# Patient Record
Sex: MALE | Race: WHITE | NOT HISPANIC OR LATINO | Employment: OTHER | URBAN - METROPOLITAN AREA
[De-identification: names, ages, dates, MRNs, and addresses within clinical notes are randomized per-mention and may not be internally consistent; named-entity substitution may affect disease eponyms.]

---

## 2017-01-17 ENCOUNTER — APPOINTMENT (OUTPATIENT)
Dept: PREADMISSION TESTING | Facility: HOSPITAL | Age: 77
End: 2017-01-17

## 2017-01-17 DIAGNOSIS — I47.1 SVT (SUPRAVENTRICULAR TACHYCARDIA) (HCC): ICD-10-CM

## 2017-01-17 LAB
ANION GAP SERPL CALCULATED.3IONS-SCNC: 10 MMOL/L (ref 3–11)
BASOPHILS # BLD AUTO: 0.02 10*3/MM3 (ref 0–0.2)
BASOPHILS NFR BLD AUTO: 0.2 % (ref 0–1)
BUN BLD-MCNC: 26 MG/DL (ref 9–23)
BUN/CREAT SERPL: 26 (ref 7–25)
CALCIUM SPEC-SCNC: 9.8 MG/DL (ref 8.7–10.4)
CHLORIDE SERPL-SCNC: 100 MMOL/L (ref 99–109)
CO2 SERPL-SCNC: 32 MMOL/L (ref 20–31)
CREAT BLD-MCNC: 1 MG/DL (ref 0.6–1.3)
DEPRECATED RDW RBC AUTO: 47.8 FL (ref 37–54)
EOSINOPHIL # BLD AUTO: 0.06 10*3/MM3 (ref 0.1–0.3)
EOSINOPHIL NFR BLD AUTO: 0.7 % (ref 0–3)
ERYTHROCYTE [DISTWIDTH] IN BLOOD BY AUTOMATED COUNT: 13.9 % (ref 11.3–14.5)
GFR SERPL CREATININE-BSD FRML MDRD: 72 ML/MIN/1.73
GLUCOSE BLD-MCNC: 103 MG/DL (ref 70–100)
HCT VFR BLD AUTO: 44.6 % (ref 38.9–50.9)
HGB BLD-MCNC: 14.9 G/DL (ref 13.1–17.5)
IMM GRANULOCYTES # BLD: 0.01 10*3/MM3 (ref 0–0.03)
IMM GRANULOCYTES NFR BLD: 0.1 % (ref 0–0.6)
INR PPP: 0.97
LYMPHOCYTES # BLD AUTO: 1.57 10*3/MM3 (ref 0.6–4.8)
LYMPHOCYTES NFR BLD AUTO: 18.9 % (ref 24–44)
MCH RBC QN AUTO: 30.8 PG (ref 27–31)
MCHC RBC AUTO-ENTMCNC: 33.4 G/DL (ref 32–36)
MCV RBC AUTO: 92.3 FL (ref 80–99)
MONOCYTES # BLD AUTO: 0.62 10*3/MM3 (ref 0–1)
MONOCYTES NFR BLD AUTO: 7.5 % (ref 0–12)
NEUTROPHILS # BLD AUTO: 6.02 10*3/MM3 (ref 1.5–8.3)
NEUTROPHILS NFR BLD AUTO: 72.6 % (ref 41–71)
PLATELET # BLD AUTO: 218 10*3/MM3 (ref 150–450)
PMV BLD AUTO: 9 FL (ref 6–12)
POTASSIUM BLD-SCNC: 4.3 MMOL/L (ref 3.5–5.5)
PROTHROMBIN TIME: 10.6 SECONDS (ref 9.6–11.5)
RBC # BLD AUTO: 4.83 10*6/MM3 (ref 4.2–5.76)
SODIUM BLD-SCNC: 142 MMOL/L (ref 132–146)
WBC NRBC COR # BLD: 8.3 10*3/MM3 (ref 3.5–10.8)

## 2017-01-17 PROCEDURE — 80048 BASIC METABOLIC PNL TOTAL CA: CPT | Performed by: PHYSICIAN ASSISTANT

## 2017-01-17 PROCEDURE — 85610 PROTHROMBIN TIME: CPT | Performed by: PHYSICIAN ASSISTANT

## 2017-01-17 PROCEDURE — 36415 COLL VENOUS BLD VENIPUNCTURE: CPT

## 2017-01-17 PROCEDURE — 85025 COMPLETE CBC W/AUTO DIFF WBC: CPT | Performed by: PHYSICIAN ASSISTANT

## 2017-01-17 NOTE — DISCHARGE INSTRUCTIONS
The following instructions given during Pre Admission Testing visit:    Do not eat or drink anything after MN except for sips of water with your a.m. Prescription meds unless otherwise instructed by your physician.    Glasses and jewelry may be worn, but dentures must be removed prior to cath/procedure.    Leave any items you consider valuable at home.    Family members may wait in [facility] waiting area during procedure.    Bring all medications in their original containers the day of procedure.    Bring photo ID and insurance cards on the day of procedure.    Need to make arrangements for transportation prior to discharge.    The following handouts were given:     Heart Cath pathway (if applicable)   Cardiac Cath booklet published by [facility]    OR appropriate [facility] procedure booklet    If applicable, pt instructed to bring CPAP mask and tubing the day of procedure.

## 2017-01-18 PROBLEM — I47.19 AVNRT (AV NODAL RE-ENTRY TACHYCARDIA): Status: ACTIVE | Noted: 2017-01-18

## 2017-01-18 PROBLEM — R00.1 SEVERE SINUS BRADYCARDIA: Status: ACTIVE | Noted: 2017-01-18

## 2017-01-18 PROBLEM — I47.1 AVNRT (AV NODAL RE-ENTRY TACHYCARDIA): Status: ACTIVE | Noted: 2017-01-18

## 2017-03-07 ENCOUNTER — TELEPHONE (OUTPATIENT)
Dept: CARDIOLOGY | Facility: CLINIC | Age: 77
End: 2017-03-07

## 2017-03-07 NOTE — TELEPHONE ENCOUNTER
Pt called stating that since last week his heart rate has had periods of time where his heart rate is elevated 115-120.  Today has been fluctuating from .  He is not in town and I recommended that he see his physician down there and they can fax us results of his ppm interrogation if they would like for us to review as well.

## 2017-03-14 NOTE — TELEPHONE ENCOUNTER
We received the note and pacemaker interoggation. I placed it on your desk to review. Patient wanting to know if he needed any additional medication or medication changes for fast heart rate.

## 2017-03-15 ENCOUNTER — TELEPHONE (OUTPATIENT)
Dept: CARDIOLOGY | Facility: CLINIC | Age: 77
End: 2017-03-15

## 2017-03-15 NOTE — TELEPHONE ENCOUNTER
Called patient regarding [facility] recommendations. [facility] reviewed PPM interrogation from [facility] and said he didn't want to make any changes now and will see the patient at his scheduled appointment on 4/11/17.

## 2017-04-11 ENCOUNTER — OFFICE VISIT (OUTPATIENT)
Dept: CARDIOLOGY | Facility: CLINIC | Age: 77
End: 2017-04-11

## 2017-04-11 VITALS
HEART RATE: 85 BPM | SYSTOLIC BLOOD PRESSURE: 138 MMHG | BODY MASS INDEX: 31.12 KG/M2 | HEIGHT: 70 IN | WEIGHT: 217.4 LBS | DIASTOLIC BLOOD PRESSURE: 78 MMHG

## 2017-04-11 DIAGNOSIS — Z98.890 S/P CATHETER ABLATION OF SLOW PATHWAY: ICD-10-CM

## 2017-04-11 DIAGNOSIS — Z98.890 S/P ABLATION OF ACCESSORY BYPASS TRACT: ICD-10-CM

## 2017-04-11 DIAGNOSIS — I47.1 AVNRT (AV NODAL RE-ENTRY TACHYCARDIA) (HCC): ICD-10-CM

## 2017-04-11 DIAGNOSIS — R53.83 OTHER FATIGUE: Primary | ICD-10-CM

## 2017-04-11 DIAGNOSIS — Z86.79 S/P CATHETER ABLATION OF SLOW PATHWAY: ICD-10-CM

## 2017-04-11 PROCEDURE — 99213 OFFICE O/P EST LOW 20 MIN: CPT | Performed by: INTERNAL MEDICINE

## 2017-04-11 PROCEDURE — 93288 INTERROG EVL PM/LDLS PM IP: CPT | Performed by: INTERNAL MEDICINE

## 2017-04-11 RX ORDER — VALSARTAN 40 MG/1
TABLET ORAL 2 TIMES DAILY
Refills: 3 | COMMUNITY
Start: 2017-02-15 | End: 2018-09-18

## 2017-04-11 NOTE — PROGRESS NOTES
Chief Complaint: Fatigue    History of Present Illness:    The acute uncomplicated chief complaint first occurred in last few years,  is persistent in nature, moderate in severity, daily in occurrence, happening multiple times per day, lasting minutes at a time, and has been medicated with diltiazem, and manifest as generalized weakness. It is worsened with exertion and is improved with rest.    Patient Active Problem List   Diagnosis   • SVT (supraventricular tachycardia)   • Pacemaker   • Essential hypertension   • Pure hypercholesterolemia   • Vasovagal syncope   • AVNRT (AV praveen re-entry tachycardia)   • Severe sinus bradycardia   • Sick sinus syndrome   • Hypertension   • Hyperlipidemia   • Osteoarthritis   • Depression   • Alcohol abuse          Current Outpatient Prescriptions:   •  acetaminophen (TYLENOL) 500 MG tablet, Take 500 mg by mouth Every 6 (Six) Hours As Needed for mild pain (1-3)., Disp: , Rfl:   •  aspirin 81 MG EC tablet, Take 81 mg by mouth Daily., Disp: , Rfl:   •  fluticasone (FLONASE) 50 MCG/ACT nasal spray, 2 sprays into each nostril Daily., Disp: , Rfl:   •  MAGNESIUM PO, Take 200 mg by mouth 2 (Two) Times a Day., Disp: , Rfl:   •  Omega-3 Fatty Acids (FISH OIL) 1200 MG capsule capsule, Take 1,200 mg by mouth Daily With Breakfast., Disp: , Rfl:   •  PARoxetine (PAXIL) 20 MG tablet, Take 20 mg by mouth Every Morning., Disp: , Rfl:   •  valsartan (DIOVAN) 40 MG tablet, 2 (Two) Times a Day., Disp: , Rfl: 3  •  vitamin C (ASCORBIC ACID) 500 MG tablet, Take 1,000 mg by mouth Daily., Disp: , Rfl:   •  vitamin E 100 UNIT capsule, Take 400 Units by mouth Daily., Disp: , Rfl:    Allergies   Allergen Reactions   • Ace Inhibitors      Cough    • Lipitor [Atorvastatin]      Severe depression   • Other      Beta Blockers - fatigue    • Penicillins Hives        ROS:    Denies chest pain, tightness, palpitations, CHAVARRIA, PND, or edema    /78 (BP Location: Left arm, Patient Position: Sitting)  Pulse  "85  Ht 70\" (177.8 cm)  Wt 217 lb 6.4 oz (98.6 kg)  BMI 31.19 kg/m2.     Physical Exam    Lungs: CTA, no wheezing, equal air entry bilaterally, resonant to percussion  Cor: RRR, physiologic S1, S2, no rubs, gallops, murmurs or thrills  Ext: warm, negative edema     Diagnosis Plan   1. Other fatigue, vs. I MOST SUSPECT SEVERE Anxiety as has many complaints with objective findings on pacemaker that do not bear out any cardiac issue.  Consider alternate Rx for anxiety as his issues may be related to this. Follow-up 6 months   2. S/P ablation of accessory bypass tract     3. S/P catheter ablation of slow pathway     4. AVNRT (AV praveen re-entry tachycardia)            Device Check (PM)    Company MDT  Mode AAIR  Lower Rate 60 bpm  Upper rate 130 bpm         Thresholds    % pacing 47  Atrial Pacing 0.5 Volts @ 0.4 ms  Atrial Sensing 3.5 mV  Atrial Impedence 437 Ohms    % pacing 0  Right Ventricular Pacing 0.75 Volts @ 0.4 ms  Right Ventricular Sensing 4.9 mV  Right Ventricular Impedence 532 Ohms      Battery Voltage 3.01 Volts  Longevity 7Y      Episodes NON-SUSTAINED AT A FEW EPISODES    Reprogramming NONE    Comments NORMAL FUNCTION  "

## 2018-01-29 ENCOUNTER — DOCUMENTATION (OUTPATIENT)
Dept: CARDIOLOGY | Facility: CLINIC | Age: 78
End: 2018-01-29

## 2018-09-18 ENCOUNTER — OFFICE VISIT (OUTPATIENT)
Dept: CARDIOLOGY | Facility: CLINIC | Age: 78
End: 2018-09-18

## 2018-09-18 VITALS
HEIGHT: 70 IN | DIASTOLIC BLOOD PRESSURE: 74 MMHG | SYSTOLIC BLOOD PRESSURE: 132 MMHG | HEART RATE: 65 BPM | WEIGHT: 202 LBS | BODY MASS INDEX: 28.92 KG/M2

## 2018-09-18 DIAGNOSIS — I49.5 SICK SINUS SYNDROME (HCC): ICD-10-CM

## 2018-09-18 DIAGNOSIS — Z95.0 PACEMAKER: Primary | ICD-10-CM

## 2018-09-18 DIAGNOSIS — I10 ESSENTIAL HYPERTENSION: ICD-10-CM

## 2018-09-18 DIAGNOSIS — I47.1 AVNRT (AV NODAL RE-ENTRY TACHYCARDIA) (HCC): ICD-10-CM

## 2018-09-18 DIAGNOSIS — R00.1 SEVERE SINUS BRADYCARDIA: ICD-10-CM

## 2018-09-18 PROCEDURE — 93280 PM DEVICE PROGR EVAL DUAL: CPT | Performed by: PHYSICIAN ASSISTANT

## 2018-09-18 PROCEDURE — 99214 OFFICE O/P EST MOD 30 MIN: CPT | Performed by: PHYSICIAN ASSISTANT

## 2018-09-18 RX ORDER — LOSARTAN POTASSIUM 25 MG/1
25 TABLET ORAL 2 TIMES DAILY
COMMUNITY
End: 2019-11-27

## 2018-09-18 RX ORDER — ALENDRONATE SODIUM 70 MG/1
70 TABLET ORAL
COMMUNITY
End: 2019-07-17
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
## 2018-12-10 ENCOUNTER — TELEPHONE (OUTPATIENT)
Dept: CARDIOLOGY | Facility: CLINIC | Age: 78
End: 2018-12-10

## 2018-12-10 NOTE — TELEPHONE ENCOUNTER
Pt called with concerns of his heart rate being in the 40s.  Explained to pt that if he is having extra beats that his blood pressure cuff if not going to get a true reading of heart rate.  He has a home monitor and he will send in a reading when he gets home.  Called  to transfer [facility] transmissions to us.  Pt is seeing Dr Craft on the 17th.    Normal transmission.  Presenting EGM showed 2 PVCs.  Explained to pt and he is ok with this.  He is not symptomatic was just concerned his heart rate showing 40s on Blood pressure machine and on his phone.

## 2018-12-17 ENCOUNTER — OFFICE VISIT (OUTPATIENT)
Dept: CARDIOLOGY | Facility: CLINIC | Age: 78
End: 2018-12-17

## 2018-12-17 VITALS
WEIGHT: 210 LBS | BODY MASS INDEX: 30.06 KG/M2 | HEART RATE: 132 BPM | HEIGHT: 70 IN | SYSTOLIC BLOOD PRESSURE: 128 MMHG | DIASTOLIC BLOOD PRESSURE: 82 MMHG

## 2018-12-17 DIAGNOSIS — Z95.0 PACEMAKER: ICD-10-CM

## 2018-12-17 DIAGNOSIS — I10 ESSENTIAL HYPERTENSION: ICD-10-CM

## 2018-12-17 DIAGNOSIS — E78.00 PURE HYPERCHOLESTEROLEMIA: ICD-10-CM

## 2018-12-17 DIAGNOSIS — I47.1 SVT (SUPRAVENTRICULAR TACHYCARDIA) (HCC): Primary | ICD-10-CM

## 2018-12-17 PROCEDURE — 93000 ELECTROCARDIOGRAM COMPLETE: CPT | Performed by: INTERNAL MEDICINE

## 2018-12-17 PROCEDURE — 99213 OFFICE O/P EST LOW 20 MIN: CPT | Performed by: INTERNAL MEDICINE

## 2018-12-17 RX ORDER — NEBIVOLOL 5 MG/1
5 TABLET ORAL DAILY
Qty: 90 TABLET | Refills: 1 | Status: SHIPPED | OUTPATIENT
Start: 2018-12-17 | End: 2018-12-26 | Stop reason: SDUPTHER

## 2018-12-17 RX ORDER — DESVENLAFAXINE SUCCINATE 50 MG/1
50 TABLET, EXTENDED RELEASE ORAL DAILY
COMMUNITY
End: 2019-07-17
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
## 2018-12-26 ENCOUNTER — TELEPHONE (OUTPATIENT)
Dept: CARDIOLOGY | Facility: CLINIC | Age: 78
End: 2018-12-26

## 2018-12-26 RX ORDER — NEBIVOLOL 5 MG/1
5 TABLET ORAL DAILY
Qty: 90 TABLET | Refills: 1 | Status: SHIPPED | OUTPATIENT
Start: 2018-12-26 | End: 2018-12-26 | Stop reason: DRUGHIGH

## 2018-12-26 RX ORDER — NEBIVOLOL 2.5 MG/1
2.5 TABLET ORAL DAILY
Qty: 30 TABLET | Refills: 5 | Status: SHIPPED | OUTPATIENT
Start: 2018-12-26 | End: 2019-07-19 | Stop reason: SDUPTHER

## 2018-12-26 NOTE — TELEPHONE ENCOUNTER
Patient notified of new Rx for Bystolic 2.5 mg by mouth daily sent to his pharmacy. He verbalized understanding.

## 2018-12-26 NOTE — TELEPHONE ENCOUNTER
Patient has been taking Bystolic 2.5 mg every day since his last office visit. Had 2.5 mg tablets at home. His Heart Rate has been <80. He wants a new Rx of the 2.5 mg sent into his pharmacy. Does not want the 5 mg of Bystolic.Please advise.

## 2019-01-03 ENCOUNTER — TELEPHONE (OUTPATIENT)
Dept: CARDIOLOGY | Facility: CLINIC | Age: 79
End: 2019-01-03

## 2019-01-03 NOTE — TELEPHONE ENCOUNTER
This predominantly related to a supraventricular tachycardia which we're treating with beta blockade and trending frequency and duration of arrhythmia through your pacemaker.  We would be happy to forward theTwelve-lead EKG and device interrogation information to Dr. Caal.

## 2019-01-03 NOTE — TELEPHONE ENCOUNTER
Regarding: Test Results Question  Contact: [phone]  ----- Message from [employer], Generic sent at 1/3/2019  2:16 PM EST -----    Dr. Craft,  I noticed in the results section of my recent 12 lead EKG that is stated it was abnormal.  Did this rekate to the PVC's or my sick sinus syndrome or something else.  I'm just trying to keep abreast of my health.  Also, would your office be able to fax the 12 lead EKG to my [facility] cardiologist for their records.  Dr. Arias Caal  [facility]  [place], [place]  FAX # [phone]  Thank You so much.  Rogelio Watkins  [phone]

## 2019-04-17 ENCOUNTER — TELEPHONE (OUTPATIENT)
Dept: CARDIOLOGY | Facility: CLINIC | Age: 79
End: 2019-04-17

## 2019-04-17 ENCOUNTER — CLINICAL SUPPORT NO REQUIREMENTS (OUTPATIENT)
Dept: CARDIOLOGY | Facility: CLINIC | Age: 79
End: 2019-04-17

## 2019-04-17 DIAGNOSIS — I49.5 SICK SINUS SYNDROME (HCC): ICD-10-CM

## 2019-04-17 DIAGNOSIS — I47.1 SVT (SUPRAVENTRICULAR TACHYCARDIA) (HCC): ICD-10-CM

## 2019-04-17 PROCEDURE — 93296 REM INTERROG EVL PM/IDS: CPT | Performed by: INTERNAL MEDICINE

## 2019-04-17 PROCEDURE — 93294 REM INTERROG EVL PM/LDLS PM: CPT | Performed by: INTERNAL MEDICINE

## 2019-07-17 ENCOUNTER — OFFICE VISIT (OUTPATIENT)
Dept: CARDIOLOGY | Facility: CLINIC | Age: 79
End: 2019-07-17

## 2019-07-17 VITALS
DIASTOLIC BLOOD PRESSURE: 70 MMHG | HEART RATE: 98 BPM | BODY MASS INDEX: 29.29 KG/M2 | WEIGHT: 209.2 LBS | SYSTOLIC BLOOD PRESSURE: 126 MMHG | OXYGEN SATURATION: 96 % | HEIGHT: 71 IN

## 2019-07-17 DIAGNOSIS — I47.1 SVT (SUPRAVENTRICULAR TACHYCARDIA) (HCC): Primary | ICD-10-CM

## 2019-07-17 DIAGNOSIS — R00.1 SEVERE SINUS BRADYCARDIA: ICD-10-CM

## 2019-07-17 DIAGNOSIS — Z95.0 PACEMAKER: ICD-10-CM

## 2019-07-17 PROCEDURE — 99213 OFFICE O/P EST LOW 20 MIN: CPT | Performed by: INTERNAL MEDICINE
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
## 2019-07-19 RX ORDER — NEBIVOLOL 2.5 MG/1
2.5 TABLET ORAL DAILY
Qty: 90 TABLET | Refills: 1 | Status: SHIPPED | OUTPATIENT
Start: 2019-07-19 | End: 2020-01-07

## 2019-08-21 ENCOUNTER — TELEPHONE (OUTPATIENT)
Dept: CARDIOLOGY | Facility: CLINIC | Age: 79
End: 2019-08-21

## 2019-08-21 NOTE — TELEPHONE ENCOUNTER
Called pt to remind him it is time to send in a pacemaker reading.  Left my name and number if he has any questions.    Pt returned my call and doesn't want to send at this time due to just seeing Dr Craft in July.  Will send in a new time to send in a reading.

## 2019-08-22 ENCOUNTER — PATIENT MESSAGE (OUTPATIENT)
Dept: CARDIOLOGY | Facility: CLINIC | Age: 79
End: 2019-08-22

## 2019-11-20 ENCOUNTER — CLINICAL SUPPORT NO REQUIREMENTS (OUTPATIENT)
Dept: CARDIOLOGY | Facility: CLINIC | Age: 79
End: 2019-11-20

## 2019-11-20 ENCOUNTER — TELEPHONE (OUTPATIENT)
Dept: CARDIOLOGY | Facility: CLINIC | Age: 79
End: 2019-11-20

## 2019-11-20 DIAGNOSIS — I49.5 SICK SINUS SYNDROME (HCC): Primary | ICD-10-CM

## 2019-11-20 DIAGNOSIS — I47.1 SVT (SUPRAVENTRICULAR TACHYCARDIA) (HCC): ICD-10-CM

## 2019-11-20 PROCEDURE — 93296 REM INTERROG EVL PM/IDS: CPT | Performed by: INTERNAL MEDICINE

## 2019-11-20 PROCEDURE — 93294 REM INTERROG EVL PM/LDLS PM: CPT | Performed by: INTERNAL MEDICINE

## 2019-11-20 RX ORDER — ASPIRIN 81 MG/1
81 TABLET ORAL DAILY
Qty: 30 TABLET | Refills: 5 | Status: SHIPPED | OUTPATIENT
Start: 2019-11-20 | End: 2019-11-27

## 2019-11-20 NOTE — TELEPHONE ENCOUNTER
----- Message from Parish Craft III, MD sent at 11/20/2019  4:15 PM EST -----  Pacemaker picked up a 9-hour episode of A. fib that he needs to come in and talk about.  I would have him start taking low-dose aspirin in the meantime.

## 2019-11-27 ENCOUNTER — OFFICE VISIT (OUTPATIENT)
Dept: CARDIOLOGY | Facility: CLINIC | Age: 79
End: 2019-11-27

## 2019-11-27 VITALS
BODY MASS INDEX: 30.92 KG/M2 | SYSTOLIC BLOOD PRESSURE: 140 MMHG | WEIGHT: 216 LBS | DIASTOLIC BLOOD PRESSURE: 80 MMHG | HEIGHT: 70 IN | HEART RATE: 86 BPM

## 2019-11-27 DIAGNOSIS — Z95.0 PACEMAKER: ICD-10-CM

## 2019-11-27 DIAGNOSIS — I10 ESSENTIAL HYPERTENSION: ICD-10-CM

## 2019-11-27 DIAGNOSIS — I47.1 SVT (SUPRAVENTRICULAR TACHYCARDIA) (HCC): Primary | ICD-10-CM

## 2019-11-27 DIAGNOSIS — E78.00 PURE HYPERCHOLESTEROLEMIA: ICD-10-CM

## 2019-11-27 DIAGNOSIS — I49.5 SICK SINUS SYNDROME (HCC): ICD-10-CM

## 2019-11-27 PROCEDURE — 99213 OFFICE O/P EST LOW 20 MIN: CPT | Performed by: INTERNAL MEDICINE

## 2019-11-27 PROCEDURE — 93280 PM DEVICE PROGR EVAL DUAL: CPT | Performed by: INTERNAL MEDICINE

## 2019-11-27 RX ORDER — AMLODIPINE BESYLATE 5 MG/1
10 TABLET ORAL DAILY
Refills: 3 | COMMUNITY
Start: 2019-09-06 | End: 2020-12-16
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
## 2020-01-07 RX ORDER — NEBIVOLOL HYDROCHLORIDE 2.5 MG/1
TABLET ORAL
Qty: 90 TABLET | Refills: 1 | Status: SHIPPED | OUTPATIENT
Start: 2020-01-07 | End: 2020-07-02

## 2020-01-20 ENCOUNTER — TELEPHONE (OUTPATIENT)
Dept: CARDIOLOGY | Facility: CLINIC | Age: 80
End: 2020-01-20

## 2020-01-20 DIAGNOSIS — G45.9 TIA (TRANSIENT ISCHEMIC ATTACK): Primary | ICD-10-CM

## 2020-01-20 NOTE — TELEPHONE ENCOUNTER
Last wed lost his balance and vomited.  Went to [facility] in [place] and admitted for 4 days. Stated was diagnosed with a TIA. 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 No intervention done. Does not want to see a neurologist in [place]. Wants a referral placed for Neurologist at [facility]. He will be back in [place] Feb 19, 20 and 21 st. Has appt with us on 2/19/2020. States will have his records faxed to our office. Please advise.

## 2020-02-19 ENCOUNTER — OFFICE VISIT (OUTPATIENT)
Dept: CARDIOLOGY | Facility: CLINIC | Age: 80
End: 2020-02-19

## 2020-02-19 ENCOUNTER — OFFICE VISIT (OUTPATIENT)
Dept: NEUROLOGY | Facility: CLINIC | Age: 80
End: 2020-02-19

## 2020-02-19 VITALS
HEART RATE: 70 BPM | WEIGHT: 219.6 LBS | SYSTOLIC BLOOD PRESSURE: 136 MMHG | DIASTOLIC BLOOD PRESSURE: 86 MMHG | BODY MASS INDEX: 30.74 KG/M2 | HEIGHT: 71 IN | OXYGEN SATURATION: 97 %

## 2020-02-19 VITALS — HEIGHT: 70 IN | WEIGHT: 216 LBS | HEART RATE: 76 BPM | BODY MASS INDEX: 30.92 KG/M2 | OXYGEN SATURATION: 99 %

## 2020-02-19 DIAGNOSIS — I47.1 SVT (SUPRAVENTRICULAR TACHYCARDIA) (HCC): ICD-10-CM

## 2020-02-19 DIAGNOSIS — Z95.0 PACEMAKER: ICD-10-CM

## 2020-02-19 DIAGNOSIS — I49.5 SICK SINUS SYNDROME (HCC): Primary | ICD-10-CM

## 2020-02-19 DIAGNOSIS — I10 ESSENTIAL HYPERTENSION: ICD-10-CM

## 2020-02-19 DIAGNOSIS — G45.9 TIA (TRANSIENT ISCHEMIC ATTACK): Primary | ICD-10-CM

## 2020-02-19 DIAGNOSIS — E78.2 MIXED HYPERLIPIDEMIA: ICD-10-CM

## 2020-02-19 PROCEDURE — 93280 PM DEVICE PROGR EVAL DUAL: CPT | Performed by: INTERNAL MEDICINE

## 2020-02-19 PROCEDURE — 99215 OFFICE O/P EST HI 40 MIN: CPT | Performed by: PHYSICIAN ASSISTANT

## 2020-02-19 PROCEDURE — 99214 OFFICE O/P EST MOD 30 MIN: CPT | Performed by: INTERNAL MEDICINE

## 2020-02-19 RX ORDER — ASPIRIN 81 MG/1
TABLET, COATED ORAL DAILY
COMMUNITY
Start: 2020-01-17 | End: 2021-12-22

## 2020-02-19 RX ORDER — ROSUVASTATIN CALCIUM 40 MG/1
20 TABLET, COATED ORAL
COMMUNITY
End: 2020-06-29
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
## 2020-02-19 NOTE — PROGRESS NOTES
"Subjective     Chief Complaint: loss of balance      History of Present Illness   Pipe Watkins is a 79 y.o. male with a history of a fib who comes to clinic today following a hospitalization in 1/20 for potential TIA. He noted sudden onset loss of balance and vertigo upon awaking on 1/15/2020 while in [place], [place]. This lasted for at least 30 minutes. He notes associated vomiting, though denies any additional associated neurologic symptoms. He was hospitalized at [facility] in [place], where a head CT was reportedly unremarkable. A CTA of the head and neck showed right vertebral artery occlusion. An echo was reportedly unremarkable for any cardio embolic source. He was treated with ASA and Lipitor (which has now been switched to Crestor). Since his hospitalization, he notes that his symptoms continue to be resolved.       I have reviewed and confirmed the past family, social and medical history as accurate on 2/19/2020.     Review of Systems   Constitutional: Negative.    HENT: Negative.    Eyes: Negative.    Respiratory: Negative.    Cardiovascular: Negative.    Gastrointestinal: Negative.    Endocrine: Negative.    Genitourinary: Negative.    Musculoskeletal: Negative.    Skin: Negative.    Allergic/Immunologic: Negative.    Hematological: Negative.    Psychiatric/Behavioral: Negative.        Objective     Pulse 76   Ht 177.8 cm (70\")   Wt 98 kg (216 lb)   SpO2 99%   BMI 30.99 kg/m²     General appearance today is normal.   Peripheral pulses were present and symmetric.  The ophthalmoscopic exam today is unremarkable. The discs and posterior elements are unremarkable.      Physical Exam   Neurological: He has normal strength. He has a normal Finger-Nose-Finger Test. Gait normal.   Reflex Scores:       Patellar reflexes are 2+ on the right side and 2+ on the left side.  Psychiatric: His speech is normal.        Neurologic Exam     Mental Status   Speech: speech is normal   Level of " consciousness: alert  Normal comprehension.     Cranial Nerves   Cranial nerves II through XII intact.     Motor Exam   Muscle bulk: normal  Overall muscle tone: normal    Strength   Strength 5/5 throughout.     Sensory Exam   Light touch normal.     Gait, Coordination, and Reflexes     Gait  Gait: normal    Coordination   Finger to nose coordination: normal    Tremor   Resting tremor: absent    Reflexes   Right patellar: 2+  Left patellar: 2+        Assessment/Plan   Pipe was seen today for transient ischemic attack.    Diagnoses and all orders for this visit:    TIA (transient ischemic attack)          Discussion/Summary   Pipe Watkins comes to clinic today with a history of possible transient ischemic attack. This was discussed in detail. His workup has been complete and appropriate. Therefore, I do not have any further recommendations concerning this. We will obtain his neuroimaging from [place]. After discussing potential treatment options, it was elected to continue on ASA and Crestor unchanged. I also counseled him on various lifestyle strategies to lower his vascular risk factors. He will then follow up in our clinic on an as needed basis.  I spent 60 minutes face to face with the patient with 50 minutes spent on discussing diagnosis, prognosis, evaluation, current status, treatment options and management as discussed above.       As part of this visit I reviewed prior lab results, reviewed radiology results, reviewed outside records and reviewed records from prior hospitalizations which is incorporated in the HPI.      Jayda Krishna PA-C

## 2020-04-01 ENCOUNTER — TELEPHONE (OUTPATIENT)
Dept: CARDIOLOGY | Facility: CLINIC | Age: 80
End: 2020-04-01

## 2020-04-01 ENCOUNTER — CLINICAL SUPPORT NO REQUIREMENTS (OUTPATIENT)
Dept: CARDIOLOGY | Facility: CLINIC | Age: 80
End: 2020-04-01

## 2020-04-01 DIAGNOSIS — I47.1 SVT (SUPRAVENTRICULAR TACHYCARDIA) (HCC): ICD-10-CM

## 2020-04-01 DIAGNOSIS — I49.5 SICK SINUS SYNDROME (HCC): ICD-10-CM

## 2020-04-01 PROCEDURE — 93294 REM INTERROG EVL PM/LDLS PM: CPT | Performed by: INTERNAL MEDICINE

## 2020-04-01 PROCEDURE — 93296 REM INTERROG EVL PM/IDS: CPT | Performed by: INTERNAL MEDICINE

## 2020-06-19 RX ORDER — APIXABAN 5 MG/1
TABLET, FILM COATED ORAL
Qty: 60 TABLET | Refills: 2 | Status: SHIPPED | OUTPATIENT
Start: 2020-06-19 | End: 2020-09-17 | Stop reason: SDUPTHER

## 2020-06-29 ENCOUNTER — LAB (OUTPATIENT)
Dept: LAB | Facility: HOSPITAL | Age: 80
End: 2020-06-29

## 2020-06-29 ENCOUNTER — OFFICE VISIT (OUTPATIENT)
Dept: CARDIOLOGY | Facility: CLINIC | Age: 80
End: 2020-06-29

## 2020-06-29 VITALS
OXYGEN SATURATION: 96 % | HEART RATE: 61 BPM | WEIGHT: 220 LBS | BODY MASS INDEX: 32.58 KG/M2 | DIASTOLIC BLOOD PRESSURE: 64 MMHG | HEIGHT: 69 IN | SYSTOLIC BLOOD PRESSURE: 168 MMHG

## 2020-06-29 DIAGNOSIS — I49.5 SICK SINUS SYNDROME (HCC): Primary | ICD-10-CM

## 2020-06-29 DIAGNOSIS — E78.00 PURE HYPERCHOLESTEROLEMIA: ICD-10-CM

## 2020-06-29 DIAGNOSIS — I10 ESSENTIAL HYPERTENSION: ICD-10-CM

## 2020-06-29 DIAGNOSIS — Z95.0 PACEMAKER: ICD-10-CM

## 2020-06-29 LAB
CHOLEST SERPL-MCNC: 141 MG/DL (ref 0–200)
HDLC SERPL-MCNC: 59 MG/DL (ref 40–60)
LDLC SERPL CALC-MCNC: 62 MG/DL (ref 0–100)
LDLC/HDLC SERPL: 1.04 {RATIO}
TRIGL SERPL-MCNC: 102 MG/DL (ref 0–150)
VLDLC SERPL-MCNC: 20.4 MG/DL (ref 5–40)

## 2020-06-29 PROCEDURE — 36415 COLL VENOUS BLD VENIPUNCTURE: CPT | Performed by: INTERNAL MEDICINE

## 2020-06-29 PROCEDURE — 99214 OFFICE O/P EST MOD 30 MIN: CPT | Performed by: INTERNAL MEDICINE

## 2020-06-29 PROCEDURE — 80061 LIPID PANEL: CPT | Performed by: INTERNAL MEDICINE

## 2020-06-29 PROCEDURE — 93280 PM DEVICE PROGR EVAL DUAL: CPT | Performed by: INTERNAL MEDICINE

## 2020-06-29 RX ORDER — SIMVASTATIN 10 MG
10 TABLET ORAL NIGHTLY
Qty: 30 TABLET | Refills: 11 | Status: SHIPPED | OUTPATIENT
Start: 2020-06-29 | End: 2020-06-29 | Stop reason: SDUPTHER

## 2020-06-29 RX ORDER — MULTIVIT WITH MINERALS/LUTEIN
1000 TABLET ORAL DAILY
COMMUNITY

## 2020-06-29 RX ORDER — SIMVASTATIN 10 MG
10 TABLET ORAL NIGHTLY
Qty: 90 TABLET | Refills: 3 | Status: SHIPPED | OUTPATIENT
Start: 2020-06-29 | End: 2020-12-16
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
## 2020-07-01 ENCOUNTER — TELEPHONE (OUTPATIENT)
Dept: CARDIOLOGY | Facility: CLINIC | Age: 80
End: 2020-07-01

## 2020-07-02 RX ORDER — NEBIVOLOL HYDROCHLORIDE 2.5 MG/1
TABLET ORAL
Qty: 90 TABLET | Refills: 0 | Status: SHIPPED | OUTPATIENT
Start: 2020-07-02 | End: 2020-09-30 | Stop reason: SDUPTHER

## 2020-08-05 ENCOUNTER — TELEPHONE (OUTPATIENT)
Dept: CARDIOLOGY | Facility: CLINIC | Age: 80
End: 2020-08-05

## 2020-09-04 ENCOUNTER — HOSPITAL ENCOUNTER (EMERGENCY)
Facility: HOSPITAL | Age: 80
Discharge: HOME | End: 2020-09-04
Attending: EMERGENCY MEDICINE | Admitting: EMERGENCY MEDICINE

## 2020-09-04 VITALS
OXYGEN SATURATION: 97 % | BODY MASS INDEX: 27.92 KG/M2 | HEIGHT: 70 IN | RESPIRATION RATE: 18 BRPM | TEMPERATURE: 96.8 F | HEART RATE: 67 BPM | SYSTOLIC BLOOD PRESSURE: 159 MMHG | WEIGHT: 195 LBS | DIASTOLIC BLOOD PRESSURE: 83 MMHG

## 2020-09-04 DIAGNOSIS — B37.0 CANDIDA INFECTION, ORAL: Primary | ICD-10-CM

## 2020-09-04 PROCEDURE — 87205 SMEAR GRAM STAIN: CPT | Performed by: NURSE PRACTITIONER

## 2020-09-04 PROCEDURE — 87070 CULTURE OTHR SPECIMN AEROBIC: CPT | Performed by: NURSE PRACTITIONER

## 2020-09-04 PROCEDURE — 99283 EMERGENCY DEPT VISIT LOW MDM: CPT

## 2020-09-04 RX ORDER — FLUCONAZOLE 200 MG/1
200 TABLET ORAL DAILY
Qty: 2 TABLET | Refills: 0 | Status: SHIPPED | OUTPATIENT
Start: 2020-09-04 | End: 2020-12-16

## 2020-09-04 NOTE — ED PROVIDER NOTES
Subjective   Pt anais 81 yo Male who presents to the ER with c/o oral thrush that has been present for 1 week and is worsening. Describes white patches, increased redness and swelling ofthe oral mucosa. Denies any fever, throat swelling or difficulty breathing. Was seen by 7 days ago and was prescribes clotrimazole lozenges . Has been using these and OTC Tylenol without improvement.      History provided by:  Patient  Sore Throat   Location:  Generalized  Quality:  Aching  Severity:  Moderate  Onset quality:  Gradual  Duration:  7 days  Timing:  Constant  Associated symptoms: rash (oral)    Associated symptoms: no chest pain, no chills, no cough, no fever, no rhinorrhea and no shortness of breath        Review of Systems   Constitutional: Negative for chills and fever.   HENT: Positive for sore throat. Negative for rhinorrhea.    Respiratory: Negative for cough and shortness of breath.    Cardiovascular: Negative for chest pain.   Gastrointestinal: Negative for diarrhea, nausea and vomiting.   Skin: Positive for rash (oral).   Neurological: Negative for dizziness and light-headedness.   All other systems reviewed and are negative.      Past Medical History:   Diagnosis Date   • Abnormal ECG    • Alcohol abuse     with no consumption for 12 years    • Arrhythmia    • Arthritis    • Depression    • Hyperlipidemia     With Statin intolerance     • Hypertension    • Osteoarthritis    • Sick sinus syndrome (CMS/HCC)    • SVT (supraventricular tachycardia) (CMS/HCC)    • Wears hearing aid    • Wears prescription eyeglasses        Allergies   Allergen Reactions   • Ace Inhibitors      Cough    • Lipitor [Atorvastatin]      Severe depression   • Other      Beta Blockers - fatigue    • Penicillins Hives       Past Surgical History:   Procedure Laterality Date   • ABLATION OF DYSRHYTHMIC FOCUS     • CARDIAC ELECTROPHYSIOLOGY PROCEDURE N/A 1/18/2017    Procedure: Ablation AVNRT;  Surgeon: Gennaro Kirk MD;  Location[facility]  EP INVASIVE LOCATION;  Service:    • COLONOSCOPY     • INSERT / REPLACE / REMOVE PACEMAKER     • KNEE ARTHROSCOPY W/ MENISCAL REPAIR Left    • PACEMAKER IMPLANTATION  01/13/2015    [place] ER presentation  with supraventricular tachycardia at a rate of 180, converted with 12 mg of adenosine    • TONSILLECTOMY         Family History   Problem Relation Age of Onset   • Hypertension Mother    • Heart attack Father        Social History     Socioeconomic History   • Marital status:      Spouse name: Not on file   • Number of children: Not on file   • Years of education: Not on file   • Highest education level: Not on file   Tobacco Use   • Smoking status: Never Smoker   • Smokeless tobacco: Never Used   Substance and Sexual Activity   • Alcohol use: No     Comment: 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    • Drug use: No   • Sexual activity: Yes     Partners: Female           Objective   Physical Exam   Constitutional: He is oriented to person, place, and time. He appears well-developed and well-nourished.   HENT:   Head: Normocephalic and atraumatic.   Right Ear: External ear normal.   Left Ear: External ear normal.   Mouth/Throat: Uvula is midline. Posterior oropharyngeal erythema present. No posterior oropharyngeal edema.   Erythematous, oral membranes with patches.   Eyes: Conjunctivae are normal.   Neck: Neck supple.   Cardiovascular: Normal rate.   Pulmonary/Chest: Effort normal and breath sounds normal.   Lymphadenopathy:     He has no cervical adenopathy.   Neurological: He is alert and oriented to person, place, and time.   Skin: Skin is warm and dry.   Psychiatric: He has a normal mood and affect. His behavior is normal.   Vitals reviewed.      Procedures           ED Course      Oral culture obtained.  Advised patient to use medication exactly as prescribed and avoid eating or drinking for 30 minutes after using the nystatin swish and swallow oral suspension.  Instructed to  follow-up with primary care provider in 3 days or return to the ER with worsening of symptoms or new symptoms.  Patient verbalized understanding of all discussed                                     MDM    Final diagnoses:   Candida infection, oral            Yadi Ling, ADOLFO  09/04/20 [id number]

## 2020-09-06 LAB
BACTERIA SPEC AEROBE CULT: NORMAL
GRAM STN SPEC: NORMAL

## 2020-09-30 RX ORDER — NEBIVOLOL 2.5 MG/1
2.5 TABLET ORAL DAILY
Qty: 90 TABLET | Refills: 1 | Status: SHIPPED | OUTPATIENT
Start: 2020-09-30 | End: 2021-04-06 | Stop reason: SDUPTHER

## 2020-12-16 ENCOUNTER — OFFICE VISIT (OUTPATIENT)
Dept: CARDIOLOGY | Facility: CLINIC | Age: 80
End: 2020-12-16

## 2020-12-16 VITALS
DIASTOLIC BLOOD PRESSURE: 74 MMHG | HEART RATE: 79 BPM | HEIGHT: 70 IN | SYSTOLIC BLOOD PRESSURE: 168 MMHG | BODY MASS INDEX: 27.72 KG/M2 | OXYGEN SATURATION: 97 % | WEIGHT: 193.6 LBS

## 2020-12-16 DIAGNOSIS — I47.1 SVT (SUPRAVENTRICULAR TACHYCARDIA) (HCC): Primary | ICD-10-CM

## 2020-12-16 DIAGNOSIS — Z95.0 PACEMAKER: ICD-10-CM

## 2020-12-16 DIAGNOSIS — E78.2 MIXED HYPERLIPIDEMIA: ICD-10-CM

## 2020-12-16 DIAGNOSIS — I49.5 SICK SINUS SYNDROME (HCC): ICD-10-CM

## 2020-12-16 DIAGNOSIS — I10 ESSENTIAL HYPERTENSION: ICD-10-CM

## 2020-12-16 PROCEDURE — 99214 OFFICE O/P EST MOD 30 MIN: CPT | Performed by: INTERNAL MEDICINE

## 2020-12-16 PROCEDURE — 93280 PM DEVICE PROGR EVAL DUAL: CPT | Performed by: INTERNAL MEDICINE

## 2020-12-16 RX ORDER — VITAMIN B COMPLEX
1 CAPSULE ORAL DAILY
COMMUNITY
End: 2022-12-29

## 2020-12-16 RX ORDER — LANOLIN ALCOHOL/MO/W.PET/CERES
1000 CREAM (GRAM) TOPICAL DAILY
COMMUNITY

## 2020-12-16 RX ORDER — EZETIMIBE 10 MG/1
10 TABLET ORAL DAILY
COMMUNITY
End: 2021-12-22
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
## 2021-04-06 RX ORDER — NEBIVOLOL 2.5 MG/1
2.5 TABLET ORAL DAILY
Qty: 90 TABLET | Refills: 1 | Status: SHIPPED | OUTPATIENT
Start: 2021-04-06 | End: 2021-10-11

## 2021-06-21 ENCOUNTER — OFFICE VISIT (OUTPATIENT)
Dept: CARDIOLOGY | Facility: CLINIC | Age: 81
End: 2021-06-21

## 2021-06-21 VITALS
HEIGHT: 70 IN | BODY MASS INDEX: 27.63 KG/M2 | HEART RATE: 60 BPM | SYSTOLIC BLOOD PRESSURE: 138 MMHG | OXYGEN SATURATION: 95 % | DIASTOLIC BLOOD PRESSURE: 80 MMHG | WEIGHT: 193 LBS

## 2021-06-21 DIAGNOSIS — Z95.0 PACEMAKER: ICD-10-CM

## 2021-06-21 DIAGNOSIS — I49.5 SICK SINUS SYNDROME (HCC): ICD-10-CM

## 2021-06-21 DIAGNOSIS — E78.2 MIXED HYPERLIPIDEMIA: ICD-10-CM

## 2021-06-21 DIAGNOSIS — I65.23 BILATERAL CAROTID ARTERY STENOSIS: ICD-10-CM

## 2021-06-21 DIAGNOSIS — I10 ESSENTIAL HYPERTENSION: ICD-10-CM

## 2021-06-21 DIAGNOSIS — I47.1 AVNRT (AV NODAL RE-ENTRY TACHYCARDIA) (HCC): Primary | ICD-10-CM

## 2021-06-21 PROCEDURE — 93280 PM DEVICE PROGR EVAL DUAL: CPT | Performed by: INTERNAL MEDICINE

## 2021-06-21 PROCEDURE — 99214 OFFICE O/P EST MOD 30 MIN: CPT | Performed by: INTERNAL MEDICINE
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
## 2021-06-22 DIAGNOSIS — I65.23 BILATERAL CAROTID ARTERY STENOSIS: Primary | ICD-10-CM

## 2021-07-13 ENCOUNTER — TELEPHONE (OUTPATIENT)
Dept: CARDIOLOGY | Facility: CLINIC | Age: 81
End: 2021-07-13

## 2021-07-13 NOTE — TELEPHONE ENCOUNTER
States he stopped his Bystolic 2.5 mg as instructed after his office visit. Wanted to see how he would tolerate being off it.His SVT came back. Heart rate went as high as 117 bpm. He restarted back on it and now heart rate is back to normal. Please advise if any instructions.

## 2021-07-23 ENCOUNTER — HOSPITAL ENCOUNTER (OUTPATIENT)
Dept: CARDIOLOGY | Facility: HOSPITAL | Age: 81
Discharge: HOME | End: 2021-07-23
Admitting: INTERNAL MEDICINE

## 2021-07-23 VITALS
DIASTOLIC BLOOD PRESSURE: 91 MMHG | BODY MASS INDEX: 27.63 KG/M2 | WEIGHT: 193 LBS | HEIGHT: 70 IN | SYSTOLIC BLOOD PRESSURE: 157 MMHG

## 2021-07-23 DIAGNOSIS — I65.23 BILATERAL CAROTID ARTERY STENOSIS: ICD-10-CM

## 2021-07-23 LAB
BH CV ECHO MEAS - BSA(HAYCOCK): 2.1 M^2
BH CV ECHO MEAS - BSA: 2.1 M^2
BH CV ECHO MEAS - BZI_BMI: 27.7 KILOGRAMS/M^2
BH CV ECHO MEAS - BZI_METRIC_HEIGHT: 177.8 CM
BH CV ECHO MEAS - BZI_METRIC_WEIGHT: 87.5 KG
BH CV XLRA MEAS LEFT DIST CCA EDV: 17.2 CM/SEC
BH CV XLRA MEAS LEFT DIST CCA PSV: 68.6 CM/SEC
BH CV XLRA MEAS LEFT DIST ICA EDV: 31.5 CM/SEC
BH CV XLRA MEAS LEFT DIST ICA PSV: 109.3 CM/SEC
BH CV XLRA MEAS LEFT ICA/CCA RATIO: 1.5
BH CV XLRA MEAS LEFT MID CCA EDV: 19.4 CM/SEC
BH CV XLRA MEAS LEFT MID CCA PSV: 75.4 CM/SEC
BH CV XLRA MEAS LEFT MID ICA EDV: 28.7 CM/SEC
BH CV XLRA MEAS LEFT MID ICA PSV: 109.3 CM/SEC
BH CV XLRA MEAS LEFT PROX CCA EDV: 19.4 CM/SEC
BH CV XLRA MEAS LEFT PROX CCA PSV: 82.8 CM/SEC
BH CV XLRA MEAS LEFT PROX ECA EDV: 36.8 CM/SEC
BH CV XLRA MEAS LEFT PROX ECA PSV: 238.2 CM/SEC
BH CV XLRA MEAS LEFT PROX ICA EDV: 25.2 CM/SEC
BH CV XLRA MEAS LEFT PROX ICA PSV: 82.7 CM/SEC
BH CV XLRA MEAS LEFT PROX SCLA PSV: 139.5 CM/SEC
BH CV XLRA MEAS LEFT VERTEBRAL A PSV: 39 CM/SEC
BH CV XLRA MEAS RIGHT BULB EDV: 15.3 CM/SEC
BH CV XLRA MEAS RIGHT BULB PSV: 64.7 CM/SEC
BH CV XLRA MEAS RIGHT DIST CCA EDV: 11.6 CM/SEC
BH CV XLRA MEAS RIGHT DIST CCA PSV: 61.6 CM/SEC
BH CV XLRA MEAS RIGHT DIST ICA EDV: 24.3 CM/SEC
BH CV XLRA MEAS RIGHT DIST ICA PSV: 95.6 CM/SEC
BH CV XLRA MEAS RIGHT ICA/CCA RATIO: 1.9
BH CV XLRA MEAS RIGHT MID CCA EDV: 12.8 CM/SEC
BH CV XLRA MEAS RIGHT MID CCA PSV: 68.3 CM/SEC
BH CV XLRA MEAS RIGHT MID ICA EDV: 24.3 CM/SEC
BH CV XLRA MEAS RIGHT MID ICA PSV: 109.8 CM/SEC
BH CV XLRA MEAS RIGHT PROX CCA EDV: 14.3 CM/SEC
BH CV XLRA MEAS RIGHT PROX CCA PSV: 76.2 CM/SEC
BH CV XLRA MEAS RIGHT PROX ECA EDV: 31.4 CM/SEC
BH CV XLRA MEAS RIGHT PROX ECA PSV: 286.9 CM/SEC
BH CV XLRA MEAS RIGHT PROX ICA EDV: 18.9 CM/SEC
BH CV XLRA MEAS RIGHT PROX ICA PSV: 133.1 CM/SEC
BH CV XLRA MEAS RIGHT PROX SCLA PSV: 95.1 CM/SEC
BH CV XLRA MEAS RIGHT VERTEBRAL A EDV: 18.1 CM/SEC
BH CV XLRA MEAS RIGHT VERTEBRAL A PSV: 59.3 CM/SEC
LEFT ARM BP: NORMAL MMHG
MAXIMAL PREDICTED HEART RATE: 138 BPM
RIGHT ARM BP: NORMAL MMHG
STRESS TARGET HR: 117 BPM

## 2021-07-23 PROCEDURE — 93880 EXTRACRANIAL BILAT STUDY: CPT

## 2021-07-23 PROCEDURE — 93880 EXTRACRANIAL BILAT STUDY: CPT | Performed by: INTERNAL MEDICINE

## 2021-07-26 ENCOUNTER — TELEPHONE (OUTPATIENT)
Dept: CARDIOLOGY | Facility: CLINIC | Age: 81
End: 2021-07-26

## 2021-07-26 NOTE — TELEPHONE ENCOUNTER
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  Continue current medications, will repeat ultrasound imaging in 1 year.

## 2021-10-11 RX ORDER — NEBIVOLOL HYDROCHLORIDE 2.5 MG/1
TABLET ORAL
Qty: 90 TABLET | Refills: 1 | Status: SHIPPED | OUTPATIENT
Start: 2021-10-11 | End: 2022-04-13 | Stop reason: SDUPTHER

## 2021-11-02 ENCOUNTER — TELEPHONE (OUTPATIENT)
Dept: CARDIOLOGY | Facility: CLINIC | Age: 81
End: 2021-11-02

## 2021-11-14 PROCEDURE — 93294 REM INTERROG EVL PM/LDLS PM: CPT | Performed by: INTERNAL MEDICINE

## 2021-11-14 PROCEDURE — 93296 REM INTERROG EVL PM/IDS: CPT | Performed by: INTERNAL MEDICINE

## 2021-12-22 ENCOUNTER — LAB (OUTPATIENT)
Dept: LAB | Facility: HOSPITAL | Age: 81
End: 2021-12-22

## 2021-12-22 ENCOUNTER — OFFICE VISIT (OUTPATIENT)
Dept: CARDIOLOGY | Facility: CLINIC | Age: 81
End: 2021-12-22

## 2021-12-22 VITALS
BODY MASS INDEX: 28.66 KG/M2 | DIASTOLIC BLOOD PRESSURE: 70 MMHG | SYSTOLIC BLOOD PRESSURE: 134 MMHG | OXYGEN SATURATION: 98 % | HEIGHT: 70 IN | WEIGHT: 200.2 LBS | HEART RATE: 66 BPM

## 2021-12-22 DIAGNOSIS — I47.1 SVT (SUPRAVENTRICULAR TACHYCARDIA) (HCC): ICD-10-CM

## 2021-12-22 DIAGNOSIS — I48.0 PAROXYSMAL ATRIAL FIBRILLATION (HCC): ICD-10-CM

## 2021-12-22 DIAGNOSIS — I49.5 SICK SINUS SYNDROME (HCC): ICD-10-CM

## 2021-12-22 DIAGNOSIS — I10 ESSENTIAL HYPERTENSION: Primary | ICD-10-CM

## 2021-12-22 DIAGNOSIS — E78.2 MIXED HYPERLIPIDEMIA: ICD-10-CM

## 2021-12-22 PROCEDURE — 99214 OFFICE O/P EST MOD 30 MIN: CPT | Performed by: INTERNAL MEDICINE

## 2021-12-22 PROCEDURE — 36415 COLL VENOUS BLD VENIPUNCTURE: CPT | Performed by: INTERNAL MEDICINE

## 2021-12-22 PROCEDURE — 80061 LIPID PANEL: CPT | Performed by: INTERNAL MEDICINE

## 2021-12-22 RX ORDER — CHLORAL HYDRATE 500 MG
CAPSULE ORAL NIGHTLY
COMMUNITY

## 2021-12-22 RX ORDER — AMLODIPINE BESYLATE 5 MG/1
TABLET ORAL DAILY
COMMUNITY
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
## 2021-12-23 ENCOUNTER — TELEPHONE (OUTPATIENT)
Dept: CARDIOLOGY | Facility: CLINIC | Age: 81
End: 2021-12-23

## 2021-12-23 LAB
CHOLEST SERPL-MCNC: 246 MG/DL (ref 0–200)
HDLC SERPL-MCNC: 53 MG/DL (ref 40–60)
LDLC SERPL CALC-MCNC: 133 MG/DL (ref 0–100)
LDLC/HDLC SERPL: 2.38 {RATIO}
TRIGL SERPL-MCNC: 334 MG/DL (ref 0–150)
VLDLC SERPL-MCNC: 60 MG/DL (ref 5–40)

## 2021-12-23 NOTE — TELEPHONE ENCOUNTER
----- Message from Parish Craft III, MD sent at 12/23/2021  9:23 AM EST -----  Your LDL cholesterol, bad cholesterol, is trended downward from 163 to 133.  Your good cholesterol numbers are where we want to be.  Triglycerides are elevated on this lab draw because it was a nonfasting study.  Continue current dietary modifications.

## 2022-02-02 ENCOUNTER — TELEPHONE (OUTPATIENT)
Dept: CARDIOLOGY | Facility: CLINIC | Age: 82
End: 2022-02-02

## 2022-02-02 NOTE — TELEPHONE ENCOUNTER
Called patient to remind him today is his scheduled day to send in his pacemaker reading with his home monitor. He thanked me for the reminder and said he would send it in.

## 2022-02-13 PROCEDURE — 93296 REM INTERROG EVL PM/IDS: CPT | Performed by: INTERNAL MEDICINE

## 2022-02-13 PROCEDURE — 93294 REM INTERROG EVL PM/LDLS PM: CPT | Performed by: INTERNAL MEDICINE

## 2022-04-13 RX ORDER — NEBIVOLOL 2.5 MG/1
2.5 TABLET ORAL DAILY
Qty: 90 TABLET | Refills: 1 | Status: SHIPPED | OUTPATIENT
Start: 2022-04-13 | End: 2022-04-15 | Stop reason: SDUPTHER

## 2022-04-15 RX ORDER — NEBIVOLOL 2.5 MG/1
2.5 TABLET ORAL DAILY
Qty: 90 TABLET | Refills: 1 | Status: SHIPPED | OUTPATIENT
Start: 2022-04-15 | End: 2022-12-29

## 2022-04-15 NOTE — TELEPHONE ENCOUNTER
"Initial med refill sent to [facility] in [place].    Pharmacist states he can \"pull\" the Eliquis that was sent to [facility] in [place] as well.    "

## 2022-05-04 ENCOUNTER — TELEPHONE (OUTPATIENT)
Dept: CARDIOLOGY | Facility: CLINIC | Age: 82
End: 2022-05-04

## 2022-05-15 PROCEDURE — 93296 REM INTERROG EVL PM/IDS: CPT | Performed by: INTERNAL MEDICINE

## 2022-05-15 PROCEDURE — 93294 REM INTERROG EVL PM/LDLS PM: CPT | Performed by: INTERNAL MEDICINE

## 2022-06-08 ENCOUNTER — OFFICE VISIT (OUTPATIENT)
Dept: CARDIOLOGY | Facility: CLINIC | Age: 82
End: 2022-06-08

## 2022-06-08 VITALS
SYSTOLIC BLOOD PRESSURE: 142 MMHG | HEIGHT: 70 IN | HEART RATE: 75 BPM | OXYGEN SATURATION: 97 % | DIASTOLIC BLOOD PRESSURE: 62 MMHG | WEIGHT: 203 LBS | BODY MASS INDEX: 29.06 KG/M2

## 2022-06-08 DIAGNOSIS — E78.2 MIXED HYPERLIPIDEMIA: ICD-10-CM

## 2022-06-08 DIAGNOSIS — I10 ESSENTIAL HYPERTENSION: Primary | ICD-10-CM

## 2022-06-08 DIAGNOSIS — I49.5 SICK SINUS SYNDROME: ICD-10-CM

## 2022-06-08 DIAGNOSIS — I48.0 PAROXYSMAL ATRIAL FIBRILLATION: ICD-10-CM

## 2022-06-08 PROCEDURE — 93280 PM DEVICE PROGR EVAL DUAL: CPT | Performed by: INTERNAL MEDICINE

## 2022-06-08 PROCEDURE — 99214 OFFICE O/P EST MOD 30 MIN: CPT | Performed by: INTERNAL MEDICINE
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
## 2022-08-03 ENCOUNTER — TELEPHONE (OUTPATIENT)
Dept: CARDIOLOGY | Facility: CLINIC | Age: 82
End: 2022-08-03

## 2022-08-14 PROCEDURE — 93294 REM INTERROG EVL PM/LDLS PM: CPT | Performed by: INTERNAL MEDICINE

## 2022-08-14 PROCEDURE — 93296 REM INTERROG EVL PM/IDS: CPT | Performed by: INTERNAL MEDICINE

## 2022-12-29 ENCOUNTER — OFFICE VISIT (OUTPATIENT)
Dept: CARDIOLOGY | Facility: CLINIC | Age: 82
End: 2022-12-29

## 2022-12-29 VITALS
WEIGHT: 208.2 LBS | HEART RATE: 65 BPM | DIASTOLIC BLOOD PRESSURE: 88 MMHG | SYSTOLIC BLOOD PRESSURE: 130 MMHG | OXYGEN SATURATION: 99 % | BODY MASS INDEX: 29.81 KG/M2 | HEIGHT: 70 IN

## 2022-12-29 DIAGNOSIS — I47.1 SVT (SUPRAVENTRICULAR TACHYCARDIA): ICD-10-CM

## 2022-12-29 DIAGNOSIS — I10 PRIMARY HYPERTENSION: ICD-10-CM

## 2022-12-29 DIAGNOSIS — I48.0 PAROXYSMAL ATRIAL FIBRILLATION: Primary | ICD-10-CM

## 2022-12-29 DIAGNOSIS — E78.2 MIXED HYPERLIPIDEMIA: ICD-10-CM

## 2022-12-29 PROCEDURE — 99214 OFFICE O/P EST MOD 30 MIN: CPT | Performed by: INTERNAL MEDICINE

## 2022-12-29 PROCEDURE — 93280 PM DEVICE PROGR EVAL DUAL: CPT | Performed by: INTERNAL MEDICINE

## 2022-12-29 RX ORDER — SILDENAFIL 100 MG/1
TABLET, FILM COATED ORAL
COMMUNITY
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
## 2023-02-01 ENCOUNTER — TELEPHONE (OUTPATIENT)
Dept: CARDIOLOGY | Facility: CLINIC | Age: 83
End: 2023-02-01
Payer: MEDICARE

## 2023-02-01 NOTE — TELEPHONE ENCOUNTER
Called patient to remind him that his pacemaker reading is due today. He told me that he's tried to send in the reading twice this morning but kept getting an error code. He said that he'll try it again after the  leave and if he gets the error code again he'll give me a call.

## 2023-02-12 PROCEDURE — 93294 REM INTERROG EVL PM/LDLS PM: CPT | Performed by: INTERNAL MEDICINE

## 2023-02-12 PROCEDURE — 93296 REM INTERROG EVL PM/IDS: CPT | Performed by: INTERNAL MEDICINE

## 2023-04-11 ENCOUNTER — TELEPHONE (OUTPATIENT)
Dept: CARDIOLOGY | Facility: CLINIC | Age: 83
End: 2023-04-11
Payer: MEDICARE

## 2023-04-11 RX ORDER — NEBIVOLOL 2.5 MG/1
2.5 TABLET ORAL DAILY
Qty: 90 TABLET | Refills: 1 | Status: SHIPPED | OUTPATIENT
Start: 2023-04-11

## 2023-04-11 NOTE — TELEPHONE ENCOUNTER
Wanted to update you. Making sure you got his message sent on 4/5/23. Weaned off his Bystolic 2.5 mg daily. Everything was fine.Then last month he was having anxiety and increased blood pressure. Looked at his average HR from his smart watch. Back in December HR was .March average  was . Had 8 readings in march of -117.Went back on Bystolic 2.5 mg last Saturday. His HR,anxiety and b/p are much better. Read on Web MD that it also helps anxiety.OK to send in Rx for his Bystolic 2.5 mg daily? Please advise.

## 2023-05-03 ENCOUNTER — TELEPHONE (OUTPATIENT)
Dept: CARDIOLOGY | Facility: CLINIC | Age: 83
End: 2023-05-03
Payer: MEDICARE

## 2023-05-03 NOTE — TELEPHONE ENCOUNTER
Called patient to remind him his pacemaker reading is due today. He told me that he is currently in [place] and whenever he gets back home he'll send in the reading. This should be around the 14th.

## 2023-05-13 ENCOUNTER — TELEMEDICINE (OUTPATIENT)
Dept: FAMILY MEDICINE CLINIC | Facility: TELEHEALTH | Age: 83
End: 2023-05-13
Payer: MEDICARE

## 2023-05-13 DIAGNOSIS — U07.1 COVID-19: Primary | ICD-10-CM

## 2023-05-13 DIAGNOSIS — J01.90 ACUTE NON-RECURRENT SINUSITIS, UNSPECIFIED LOCATION: ICD-10-CM

## 2023-05-13 RX ORDER — DOXYCYCLINE HYCLATE 100 MG/1
100 CAPSULE ORAL 2 TIMES DAILY
Qty: 14 CAPSULE | Refills: 0 | Status: SHIPPED | OUTPATIENT
Start: 2023-05-13 | End: 2023-05-20

## 2023-05-13 NOTE — PROGRESS NOTES
Subjective   Chief Complaint   Patient presents with    URI       Pipe Watkins is a 82 y.o. male.     History of Present Illness  Pt reports infrequent cough, congestion, head pressure, bilateral ear popping, postnasal drainage for about 5 days.  Patient states he recently returned home from [place] and thought symptoms were due to allergies.  He and his wife both tested positive for COVID today.  He he is not so much interested in antiviral treatment at this time but wants to make suresymptoms do not progress to respiratory infection, sinus or ear infection.  He has been vaccinated and boosted against COVID per recommendations.  No prior history of COVID infection.  URI   This is a new problem. Episode onset: 5 days. The problem has been unchanged. There has been no fever. Associated symptoms include congestion, coughing, ear pain and a plugged ear sensation. Pertinent negatives include no abdominal pain, chest pain, diarrhea, dysuria, headaches, joint pain, nausea, rhinorrhea, sinus pain, sore throat, vomiting or wheezing. Treatments tried: vitamines, flonase. The treatment provided mild relief.      Allergies   Allergen Reactions    Ace Inhibitors      Cough     Lipitor [Atorvastatin]      Severe depression    Penicillins Hives       Past Medical History:   Diagnosis Date    Abnormal ECG     Alcohol abuse     with no consumption for 12 years     Arrhythmia     Arthritis     Depression     Hyperlipidemia     With Statin intolerance      Hypertension     Osteoarthritis     Paroxysmal atrial fibrillation 12/22/2021    Sick sinus syndrome     SVT (supraventricular tachycardia)     Wears hearing aid     Wears prescription eyeglasses        Past Surgical History:   Procedure Laterality Date    ABLATION OF DYSRHYTHMIC FOCUS      CARDIAC ELECTROPHYSIOLOGY PROCEDURE N/A 1/18/2017    Procedure: Ablation AVNRT;  Surgeon: Gennaro Kirk MD;  Location: [facility] INVASIVE LOCATION;  Service:     COLONOSCOPY       INSERT / REPLACE / REMOVE PACEMAKER      KNEE ARTHROSCOPY W/ MENISCAL REPAIR Left     PACEMAKER IMPLANTATION  01/13/2015    [place] ER presentation  with supraventricular tachycardia at a rate of 180, converted with 12 mg of adenosine     TONSILLECTOMY         Social History     Socioeconomic History    Marital status:    Tobacco Use    Smoking status: Never    Smokeless tobacco: Never   Substance and Sexual Activity    Alcohol use: No     Comment: 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     Drug use: No    Sexual activity: Yes     Partners: Female       Family History   Problem Relation Age of Onset    Hypertension Mother     Heart attack Father          Current Outpatient Medications:     amLODIPine (NORVASC) 5 MG tablet, Daily., Disp: , Rfl:     apixaban (Eliquis) 5 MG tablet tablet, Take 1 tablet by mouth 2 (Two) Times a Day., Disp: 60 tablet, Rfl: 2    Cholecalciferol (VITAMIN D3) 125 MCG (5000 UT) capsule capsule, Take 3,000 Units by mouth Daily., Disp: , Rfl:     doxycycline (VIBRAMYCIN) 100 MG capsule, Take 1 capsule by mouth 2 (Two) Times a Day for 7 days., Disp: 14 capsule, Rfl: 0    MAGNESIUM PO, Take 200 mg by mouth 2 (Two) Times a Day., Disp: , Rfl:     nebivolol (Bystolic) 2.5 MG tablet, Take 1 tablet by mouth Daily., Disp: 90 tablet, Rfl: 1    Omega-3 Fatty Acids (fish oil) 1000 MG capsule capsule, Take  by mouth Every Night., Disp: , Rfl:     Ubiquinol 100 MG capsule, Take 100 mg by mouth Daily., Disp: , Rfl:     vitamin B-12 (CYANOCOBALAMIN) 1000 MCG tablet, Take 1,000 mcg by mouth Daily., Disp: , Rfl:     vitamin C (ASCORBIC ACID) 250 MG tablet, Take 1,000 mg by mouth Daily., Disp: , Rfl:     vitamin E 100 UNIT capsule, Take 400 Units by mouth Daily., Disp: , Rfl:     Zinc 50 MG capsule, Take 50 mg by mouth Daily., Disp: , Rfl:       Review of Systems   Constitutional:  Positive for fatigue. Negative for chills, diaphoresis and fever.   HENT:  Positive for congestion,  ear pain, postnasal drip and sinus pressure. Negative for rhinorrhea, sore throat and voice change.    Respiratory:  Positive for cough. Negative for shortness of breath and wheezing.    Cardiovascular:  Negative for chest pain and palpitations.   Gastrointestinal:  Negative for abdominal pain, diarrhea, nausea and vomiting.   Genitourinary:  Negative for dysuria.   Musculoskeletal:  Negative for joint pain and myalgias.   Neurological:  Negative for headache.      There were no vitals filed for this visit.    Objective   Physical Exam  Constitutional:       Comments: Telephone visit   HENT:      Head: Normocephalic.      Nose: Congestion (per pt) present.      Mouth/Throat:      Lips: Pink.   Pulmonary:      Effort: Pulmonary effort is normal.   Neurological:      Mental Status: He is alert and oriented to person, place, and time.        Procedures     Assessment & Plan   Diagnoses and all orders for this visit:    1. COVID-19 (Primary)    2. Acute non-recurrent sinusitis, unspecified location  -     doxycycline (VIBRAMYCIN) 100 MG capsule; Take 1 capsule by mouth 2 (Two) Times a Day for 7 days.  Dispense: 14 capsule; Refill: 0    You may continue Flonase over-the-counter.  You may use Coricidin HBP or plain dextromethorphan over-the-counter for cough if needed.  Tylenol for pain and fever, increase fluids and rest.    If symptoms worsen or do not improve follow up with your PCP or visit your nearest Urgent Care Center or ER.              PLAN: Discussed dosing, side effects, recommended other symptomatic care.  Patient should follow up with primary care provider, Urgent Care or ER if symptoms worsen, fail to resolve or other symptoms need attention. Patient/family agree to the above.         ADOLFO Jackson     The use of a video visit has been reviewed with the patient and verbal informed consent has been obtained. Myself and Pipe Watkins participated in this visit. The patient is located at [address] [facility] [address]. I am located in [place], [place]. [employer] and Zoom were utilized.        This visit was performed via Telehealth.  This patient has been instructed to follow-up with their primary care provider if their symptoms worsen or the treatment provided does not resolve their illness.

## 2023-05-13 NOTE — PATIENT INSTRUCTIONS
You may continue Flonase over-the-counter.  You may use Coricidin HBP or plain dextromethorphan over-the-counter for cough if needed.  Tylenol for pain and fever, increase fluids and rest.    If symptoms worsen or do not improve follow up with your PCP or visit your nearest Urgent Care Center or ER.

## 2023-06-13 PROBLEM — H52.13 BILATERAL MYOPIA: Status: ACTIVE | Noted: 2020-12-29

## 2023-06-13 PROBLEM — G45.9 TRANSIENT ISCHEMIC ATTACK: Status: ACTIVE | Noted: 2020-02-19

## 2023-06-13 PROBLEM — H52.4 BILATERAL PRESBYOPIA: Status: ACTIVE | Noted: 2020-12-29

## 2023-06-13 PROBLEM — Z96.659 S/P TOTAL KNEE ARTHROPLASTY: Status: ACTIVE | Noted: 2019-04-10

## 2023-06-13 PROBLEM — H35.3131 NONEXUDATIVE AGE-RELATED MACULAR DEGENERATION, BILATERAL, EARLY DRY STAGE: Status: ACTIVE | Noted: 2020-12-29

## 2023-06-13 PROBLEM — M25.559 HIP PAIN: Status: ACTIVE | Noted: 2018-05-23

## 2023-06-13 PROBLEM — M17.12 DEGENERATIVE ARTHRITIS OF LEFT KNEE: Status: ACTIVE | Noted: 2017-12-15

## 2023-06-13 PROBLEM — I48.0 PAROXYSMAL ATRIAL FIBRILLATION: Status: ACTIVE | Noted: 2022-09-23

## 2023-06-13 PROBLEM — H43.02 VITREOUS PROLAPSE OF LEFT EYE: Status: ACTIVE | Noted: 2021-07-27

## 2023-06-13 PROBLEM — F10.21 HISTORY OF ALCOHOLISM: Status: ACTIVE | Noted: 2017-04-04

## 2023-06-13 PROBLEM — F10.10 ALCOHOL ABUSE: Status: ACTIVE | Noted: 2021-07-20

## 2023-06-13 PROBLEM — J30.9 ALLERGIC RHINITIS: Status: ACTIVE | Noted: 2021-04-19

## 2023-06-13 PROBLEM — H04.123 INSUFFICIENCY OF TEAR FILM OF BOTH EYES: Status: ACTIVE | Noted: 2020-12-29

## 2023-06-13 PROBLEM — D68.59 HYPERCOAGULABLE STATE: Status: ACTIVE | Noted: 2023-04-05

## 2023-06-13 PROBLEM — E78.5 HYPERLIPIDEMIA: Status: ACTIVE | Noted: 2017-04-04

## 2023-06-13 PROBLEM — H43.819 POSTERIOR VITREOUS DETACHMENT: Status: ACTIVE | Noted: 2021-07-27

## 2023-06-13 PROBLEM — H26.40 SECONDARY CATARACT: Status: ACTIVE | Noted: 2021-07-27

## 2023-06-13 PROBLEM — F32.A CHRONIC DEPRESSION: Status: ACTIVE | Noted: 2017-04-04

## 2023-06-13 PROBLEM — H25.10 CATARACT, NUCLEAR SCLEROTIC SENILE: Status: ACTIVE | Noted: 2020-12-29

## 2023-06-13 PROBLEM — I10 ESSENTIAL HYPERTENSION: Status: ACTIVE | Noted: 2017-02-17

## 2023-06-13 PROBLEM — H04.123 DRY EYES: Status: ACTIVE | Noted: 2022-05-02

## 2023-06-13 PROBLEM — M88.88 PAGET'S DISEASE OF BONY PELVIS: Status: ACTIVE | Noted: 2018-07-05

## 2023-06-13 PROBLEM — F32.A DEPRESSION: Status: ACTIVE | Noted: 2021-04-19

## 2023-06-13 PROBLEM — M19.90 OSTEOARTHRITIS: Status: ACTIVE | Noted: 2021-06-28

## 2023-06-14 ENCOUNTER — OFFICE VISIT (OUTPATIENT)
Dept: FAMILY MEDICINE CLINIC | Facility: CLINIC | Age: 83
End: 2023-06-14
Payer: MEDICARE

## 2023-06-14 VITALS
WEIGHT: 200 LBS | DIASTOLIC BLOOD PRESSURE: 88 MMHG | BODY MASS INDEX: 28.63 KG/M2 | SYSTOLIC BLOOD PRESSURE: 138 MMHG | OXYGEN SATURATION: 97 % | HEIGHT: 70 IN | HEART RATE: 63 BPM

## 2023-06-14 DIAGNOSIS — M88.88 PAGET'S DISEASE OF BONY PELVIS: ICD-10-CM

## 2023-06-14 DIAGNOSIS — F32.A DEPRESSION, UNSPECIFIED DEPRESSION TYPE: ICD-10-CM

## 2023-06-14 DIAGNOSIS — E78.2 MIXED HYPERLIPIDEMIA: Primary | ICD-10-CM

## 2023-06-14 DIAGNOSIS — I10 ESSENTIAL HYPERTENSION: ICD-10-CM

## 2023-06-14 PROBLEM — R68.89 SUSPECTED GLAUCOMA OF BOTH EYES: Status: ACTIVE | Noted: 2023-05-22

## 2023-06-14 PROBLEM — G45.9 TRANSIENT ISCHEMIC ATTACK: Status: RESOLVED | Noted: 2020-02-19 | Resolved: 2023-06-14

## 2023-06-14 PROBLEM — F10.10 ALCOHOL ABUSE: Status: RESOLVED | Noted: 2021-07-20 | Resolved: 2023-06-14

## 2023-06-14 PROBLEM — H02.30 EXCESS SKIN OF EYELID: Status: ACTIVE | Noted: 2023-05-22

## 2023-06-14 RX ORDER — MAGNESIUM OXIDE 400 MG/1
400 TABLET ORAL DAILY
Qty: 90 TABLET | Refills: 3 | Status: SHIPPED | OUTPATIENT
Start: 2023-06-14

## 2023-06-14 NOTE — PROGRESS NOTES
Pipe Watkins  1940  [id number]  Patient Care Team:  Teddy Paredes MD as PCP - General (Internal Medicine)  Parish Craft III, MD as Cardiologist (Cardiology)    Pipe Watkins is a 82 y.o. male here today to establish care.  This patient is accompanied by their self who contributes to the history of their care.    Chief Complaint:    Chief Complaint   Patient presents with    Hypertension    Arthritis        History of Present Illness:   This is a chronically ill gentleman here to establish.  He previously was seen by Dr. Escudero and Dr. Jimenez at [facility].  His medical history significant for cardiac arrhythmias with history of SVT, sick sinus syndrome, hyperlipidemia, chronic depression, hypertension, PAF, Paget's disease of the pelvis, TIA January 2023 is history of ablation in 2017 with subsequent pacemaker placement 2015 while liviing in [place].  He is maintained on long-term Eliquis.  Review of his last [facility] clinic note indicates that he has chronic depression and has only been able to respond to Pristiq.  He however took himself off this medication.  He received an infusion therapy by  nephrology for his Paget's disease.    He's follwed by Dr. Armenta at [facility] for his joint replacements.    He does not follow his PSA anymore.  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    He has a blocked vertebral artery.  Last duplex in 2021, symptoms pescatarian diet    Past Medical History:   Diagnosis Date    Abnormal ECG     Alcohol abuse     with no consumption for 12 years     Arrhythmia     Arthritis     Depression     Hyperlipidemia     With Statin intolerance      Hypertension     Osteoarthritis     Paroxysmal atrial fibrillation 12/22/2021    Sick sinus syndrome     SVT (supraventricular tachycardia)     Wears hearing aid     Wears prescription eyeglasses        Past Surgical History:   Procedure Laterality Date    ABLATION OF DYSRHYTHMIC FOCUS      CARDIAC ELECTROPHYSIOLOGY PROCEDURE N/A 1/18/2017     Procedure: Ablation AVNRT;  Surgeon: Gennaro Kirk MD;  Location: [facility] INVASIVE LOCATION;  Service:     COLONOSCOPY      INSERT / REPLACE / REMOVE PACEMAKER      KNEE ARTHROSCOPY W/ MENISCAL REPAIR Left     PACEMAKER IMPLANTATION  01/13/2015    [place] ER presentation  with supraventricular tachycardia at a rate of 180, converted with 12 mg of adenosine     TONSILLECTOMY          Family History   Problem Relation Age of Onset    Hypertension Mother     Heart attack Father        Social History     Socioeconomic History    Marital status:    Tobacco Use    Smoking status: Never    Smokeless tobacco: Never   Substance and Sexual Activity    Alcohol use: No     Comment: 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   HENT:  Negative for ear pain, postnasal drip, sinus pressure and sore throat.    Eyes:  Negative for blurred vision, double vision and visual disturbance.   Respiratory:  Negative for cough, shortness of breath and wheezing.    Cardiovascular:  Negative for chest pain, palpitations and leg swelling.   Gastrointestinal:  Negative for abdominal pain, blood in stool, diarrhea, nausea and vomiting.   Endocrine: Negative for cold intolerance, heat intolerance, polydipsia, polyphagia and polyuria.   Genitourinary:  Negative for dysuria, flank pain and hematuria.   Musculoskeletal:  Negative for arthralgias and joint swelling.   Skin:  Negative for dry skin and rash.   Neurological:  Negative for weakness, numbness and headache.   Psychiatric/Behavioral:  Negative for self-injury, suicidal ideas and depressed mood.      Vitals:     "06/14/23 1255   BP: 138/88   Pulse: 63   SpO2: 97%   Weight: 90.7 kg (200 lb)   Height: 177.8 cm (70\")     Body mass index is 28.7 kg/m².      Current Outpatient Medications:     amLODIPine (NORVASC) 5 MG tablet, Daily., Disp: , Rfl:     apixaban (Eliquis) 5 MG tablet tablet, Take 1 tablet by mouth 2 (Two) Times a Day., Disp: 60 tablet, Rfl: 2    Cholecalciferol (VITAMIN D3) 125 MCG (5000 UT) capsule capsule, Take 3,000 Units by mouth Daily., Disp: , Rfl:     MAGNESIUM PO, Take 200 mg by mouth 2 (Two) Times a Day., Disp: , Rfl:     nebivolol (Bystolic) 2.5 MG tablet, Take 1 tablet by mouth Daily., Disp: 90 tablet, Rfl: 1    Omega-3 Fatty Acids (fish oil) 1000 MG capsule capsule, Take  by mouth Every Night., Disp: , Rfl:     vitamin B-12 (CYANOCOBALAMIN) 1000 MCG tablet, Take 1 tablet by mouth Daily., Disp: , Rfl:     vitamin E 100 UNIT capsule, Take 4 capsules by mouth Daily., Disp: , Rfl:     Zinc 50 MG capsule, Take 50 mg by mouth Daily., Disp: , Rfl:     magnesium oxide (MAG-OX) 400 MG tablet, Take 1 tablet by mouth Daily., Disp: 90 tablet, Rfl: 3    Ubiquinol 100 MG capsule, Take 100 mg by mouth Daily. (Patient not taking: Reported on 6/14/2023), Disp: , Rfl:     vitamin C (ASCORBIC ACID) 250 MG tablet, Take 1,000 mg by mouth Daily. (Patient not taking: Reported on 6/14/2023), Disp: , Rfl:     Physical Exam:    Physical Exam  Vitals and nursing note reviewed.   Constitutional:       General: He is not in acute distress.     Appearance: He is well-developed. He is not diaphoretic.   HENT:      Head: Normocephalic and atraumatic.      Right Ear: External ear normal.      Left Ear: External ear normal.      Mouth/Throat:      Pharynx: No oropharyngeal exudate.   Eyes:      General: No scleral icterus.        Right eye: No discharge.      Conjunctiva/sclera: Conjunctivae normal.   Neck:      Thyroid: No thyromegaly.      Vascular: No JVD.      Trachea: No tracheal deviation.   Cardiovascular:      Rate and Rhythm: " Normal rate and regular rhythm.      Heart sounds: Normal heart sounds.      Comments: PMI nondisplaced  Pulmonary:      Effort: Pulmonary effort is normal.      Breath sounds: Normal breath sounds. No wheezing or rales.   Abdominal:      General: Bowel sounds are normal.      Palpations: Abdomen is soft.      Tenderness: There is no abdominal tenderness. There is no guarding or rebound.   Musculoskeletal:      Cervical back: Normal range of motion and neck supple.   Lymphadenopathy:      Cervical: No cervical adenopathy.   Skin:     General: Skin is warm and dry.      Capillary Refill: Capillary refill takes less than 2 seconds.      Coloration: Skin is not pale.      Findings: No rash.   Neurological:      Mental Status: He is alert and oriented to person, place, and time.      Motor: No abnormal muscle tone.      Coordination: Coordination normal.   Psychiatric:         Judgment: Judgment normal.       Procedures    Results Review:    None    Assessment/Plan:  Very pleasant gentleman with underlying hypertension, vertebrobasilar occlusion left chronic, history of TIA, cardiac arrhythmias on long-term anticoagulation and dyslipidemia with severe statin intolerance.  Not withstanding he is Paget's disease of the bone.  Today he has really no complaints she would like to establish care he would like to update his fasting lipid profile.  Apparently has lost the provider followed his Paget's I placed an order for an alkaline phosphatase vitamin D magnesium as well as parathyroid hormone.  We will follow-up in September for Medicare wellness  Problem List Items Addressed This Visit          Cardiac and Vasculature    Essential hypertension    Relevant Medications    amLODIPine (NORVASC) 5 MG tablet    nebivolol (Bystolic) 2.5 MG tablet    Other Relevant Orders    CBC (No Diff)    Hyperlipidemia - Primary    Overview     With Statin intolerance           Relevant Orders    Lipid Panel       Mental Health    Depression        Musculoskeletal and Injuries    Paget's disease of bony pelvis    Relevant Medications    magnesium oxide (MAG-OX) 400 MG tablet    Other Relevant Orders    Comprehensive Metabolic Panel    PTH, Intact       Plan of care reviewed with patient at the conclusion of today's visit. Education was provided regarding diagnosis and management.  Patient verbalizes understanding of and agreement with management plan.    Return in about 3 months (around 9/14/2023) for Medicare Wellness.    Teddy Paredes MD      Please note than portions of this note were completed wth a Voice Recognition Program

## 2023-06-15 ENCOUNTER — LAB (OUTPATIENT)
Dept: LAB | Facility: HOSPITAL | Age: 83
End: 2023-06-15
Payer: MEDICARE

## 2023-06-15 DIAGNOSIS — I10 ESSENTIAL HYPERTENSION: ICD-10-CM

## 2023-06-15 DIAGNOSIS — E78.2 MIXED HYPERLIPIDEMIA: ICD-10-CM

## 2023-06-15 DIAGNOSIS — M88.88 PAGET'S DISEASE OF BONY PELVIS: ICD-10-CM

## 2023-06-15 LAB
ALBUMIN SERPL-MCNC: 3.9 G/DL (ref 3.5–5.2)
ALBUMIN/GLOB SERPL: 1.4 G/DL
ALP SERPL-CCNC: 37 U/L (ref 39–117)
ALT SERPL W P-5'-P-CCNC: 12 U/L (ref 1–41)
ANION GAP SERPL CALCULATED.3IONS-SCNC: 9.3 MMOL/L (ref 5–15)
AST SERPL-CCNC: 11 U/L (ref 1–40)
BILIRUB SERPL-MCNC: 0.5 MG/DL (ref 0–1.2)
BUN SERPL-MCNC: 17 MG/DL (ref 8–23)
BUN/CREAT SERPL: 17.5 (ref 7–25)
CALCIUM SPEC-SCNC: 9.7 MG/DL (ref 8.6–10.5)
CHLORIDE SERPL-SCNC: 102 MMOL/L (ref 98–107)
CHOLEST SERPL-MCNC: 229 MG/DL (ref 0–200)
CO2 SERPL-SCNC: 25.7 MMOL/L (ref 22–29)
CREAT SERPL-MCNC: 0.97 MG/DL (ref 0.76–1.27)
DEPRECATED RDW RBC AUTO: 43.6 FL (ref 37–54)
EGFRCR SERPLBLD CKD-EPI 2021: 77.5 ML/MIN/1.73
ERYTHROCYTE [DISTWIDTH] IN BLOOD BY AUTOMATED COUNT: 13.1 % (ref 12.3–15.4)
GLOBULIN UR ELPH-MCNC: 2.8 GM/DL
GLUCOSE SERPL-MCNC: 107 MG/DL (ref 65–99)
HCT VFR BLD AUTO: 44.9 % (ref 37.5–51)
HDLC SERPL-MCNC: 63 MG/DL (ref 40–60)
HGB BLD-MCNC: 15.4 G/DL (ref 13–17.7)
LDLC SERPL CALC-MCNC: 151 MG/DL (ref 0–100)
LDLC/HDLC SERPL: 2.36 {RATIO}
MCH RBC QN AUTO: 31 PG (ref 26.6–33)
MCHC RBC AUTO-ENTMCNC: 34.3 G/DL (ref 31.5–35.7)
MCV RBC AUTO: 90.5 FL (ref 79–97)
PLATELET # BLD AUTO: 212 10*3/MM3 (ref 140–450)
PMV BLD AUTO: 9.6 FL (ref 6–12)
POTASSIUM SERPL-SCNC: 4.3 MMOL/L (ref 3.5–5.2)
PROT SERPL-MCNC: 6.7 G/DL (ref 6–8.5)
PTH-INTACT SERPL-MCNC: 56.8 PG/ML (ref 15–65)
RBC # BLD AUTO: 4.96 10*6/MM3 (ref 4.14–5.8)
SODIUM SERPL-SCNC: 137 MMOL/L (ref 136–145)
TRIGL SERPL-MCNC: 88 MG/DL (ref 0–150)
VLDLC SERPL-MCNC: 15 MG/DL (ref 5–40)
WBC NRBC COR # BLD: 4.51 10*3/MM3 (ref 3.4–10.8)

## 2023-06-15 PROCEDURE — 80053 COMPREHEN METABOLIC PANEL: CPT

## 2023-06-15 PROCEDURE — 36415 COLL VENOUS BLD VENIPUNCTURE: CPT

## 2023-06-15 PROCEDURE — 85027 COMPLETE CBC AUTOMATED: CPT

## 2023-06-15 PROCEDURE — 83970 ASSAY OF PARATHORMONE: CPT

## 2023-06-15 PROCEDURE — 80061 LIPID PANEL: CPT

## 2023-06-15 NOTE — PROGRESS NOTES
Spite his plant-based pescatarian lifestyle, his cholesterol is worse than checked 1 year ago.  DL is up to 151.  Would he considers tZetia?  it is not related to the statin medications. Please advise

## 2023-07-26 ENCOUNTER — OFFICE VISIT (OUTPATIENT)
Dept: CARDIOLOGY | Facility: CLINIC | Age: 83
End: 2023-07-26
Payer: MEDICARE

## 2023-07-26 VITALS
DIASTOLIC BLOOD PRESSURE: 64 MMHG | HEIGHT: 70 IN | HEART RATE: 66 BPM | WEIGHT: 203 LBS | BODY MASS INDEX: 29.06 KG/M2 | OXYGEN SATURATION: 98 % | SYSTOLIC BLOOD PRESSURE: 130 MMHG

## 2023-07-26 DIAGNOSIS — I47.1 AVNRT (AV NODAL RE-ENTRY TACHYCARDIA): Primary | ICD-10-CM

## 2023-07-26 DIAGNOSIS — E78.2 MIXED HYPERLIPIDEMIA: ICD-10-CM

## 2023-07-26 DIAGNOSIS — I65.23 BILATERAL CAROTID ARTERY STENOSIS: ICD-10-CM

## 2023-07-26 DIAGNOSIS — I49.5 SICK SINUS SYNDROME: ICD-10-CM

## 2023-07-26 DIAGNOSIS — I48.0 PAROXYSMAL ATRIAL FIBRILLATION: ICD-10-CM

## 2023-07-26 DIAGNOSIS — Z95.0 PACEMAKER: ICD-10-CM

## 2023-07-26 DIAGNOSIS — I10 ESSENTIAL HYPERTENSION: ICD-10-CM
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
## 2023-08-10 ENCOUNTER — HOSPITAL ENCOUNTER (OUTPATIENT)
Dept: CARDIOLOGY | Facility: HOSPITAL | Age: 83
Discharge: HOME | End: 2023-08-10
Payer: MEDICARE

## 2023-08-10 VITALS — HEIGHT: 70 IN | BODY MASS INDEX: 29.04 KG/M2 | WEIGHT: 202.82 LBS

## 2023-08-10 DIAGNOSIS — I65.23 BILATERAL CAROTID ARTERY STENOSIS: ICD-10-CM

## 2023-08-10 PROCEDURE — 93880 EXTRACRANIAL BILAT STUDY: CPT

## 2023-08-14 ENCOUNTER — OFFICE VISIT (OUTPATIENT)
Dept: FAMILY MEDICINE CLINIC | Facility: CLINIC | Age: 83
End: 2023-08-14
Payer: MEDICARE

## 2023-08-14 ENCOUNTER — LAB (OUTPATIENT)
Dept: LAB | Facility: HOSPITAL | Age: 83
End: 2023-08-14
Payer: MEDICARE

## 2023-08-14 VITALS
OXYGEN SATURATION: 98 % | HEART RATE: 76 BPM | SYSTOLIC BLOOD PRESSURE: 132 MMHG | BODY MASS INDEX: 29.32 KG/M2 | DIASTOLIC BLOOD PRESSURE: 86 MMHG | HEIGHT: 70 IN | WEIGHT: 204.8 LBS
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
## 2023-08-14 DIAGNOSIS — R68.89 HEAT INTOLERANCE: Primary | ICD-10-CM

## 2023-08-14 DIAGNOSIS — S70.269A: ICD-10-CM

## 2023-08-14 LAB
BH CV XLRA MEAS LEFT DIST CCA EDV: 13.2 CM/SEC
BH CV XLRA MEAS LEFT DIST CCA PSV: 61.2 CM/SEC
BH CV XLRA MEAS LEFT DIST ICA EDV: 17 CM/SEC
BH CV XLRA MEAS LEFT DIST ICA PSV: 68 CM/SEC
BH CV XLRA MEAS LEFT ICA/CCA RATIO: 1.21
BH CV XLRA MEAS LEFT MID CCA EDV: 13.8 CM/SEC
BH CV XLRA MEAS LEFT MID CCA PSV: 70.6 CM/SEC
BH CV XLRA MEAS LEFT MID ICA EDV: 19.9 CM/SEC
BH CV XLRA MEAS LEFT MID ICA PSV: 85.1 CM/SEC
BH CV XLRA MEAS LEFT PROX CCA EDV: 15.4 CM/SEC
BH CV XLRA MEAS LEFT PROX CCA PSV: 81.1 CM/SEC
BH CV XLRA MEAS LEFT PROX ECA EDV: 21.7 CM/SEC
BH CV XLRA MEAS LEFT PROX ECA PSV: 212 CM/SEC
BH CV XLRA MEAS LEFT PROX ICA EDV: 17 CM/SEC
BH CV XLRA MEAS LEFT PROX ICA PSV: 79 CM/SEC
BH CV XLRA MEAS LEFT PROX SCLA PSV: 136 CM/SEC
BH CV XLRA MEAS LEFT VERTEBRAL A PSV: 77.5 CM/SEC
BH CV XLRA MEAS RIGHT DIST CCA EDV: 11.4 CM/SEC
BH CV XLRA MEAS RIGHT DIST CCA PSV: 53 CM/SEC
BH CV XLRA MEAS RIGHT DIST ICA EDV: 16 CM/SEC
BH CV XLRA MEAS RIGHT DIST ICA PSV: 55 CM/SEC
BH CV XLRA MEAS RIGHT ICA/CCA RATIO: 1.51
BH CV XLRA MEAS RIGHT MID CCA EDV: 10.9 CM/SEC
BH CV XLRA MEAS RIGHT MID CCA PSV: 49.5 CM/SEC
BH CV XLRA MEAS RIGHT MID ICA EDV: 16 CM/SEC
BH CV XLRA MEAS RIGHT MID ICA PSV: 67 CM/SEC
BH CV XLRA MEAS RIGHT PROX CCA EDV: 11.4 CM/SEC
BH CV XLRA MEAS RIGHT PROX CCA PSV: 62.2 CM/SEC
BH CV XLRA MEAS RIGHT PROX ECA EDV: 11.3 CM/SEC
BH CV XLRA MEAS RIGHT PROX ECA PSV: 147 CM/SEC
BH CV XLRA MEAS RIGHT PROX ICA EDV: 16 CM/SEC
BH CV XLRA MEAS RIGHT PROX ICA PSV: 74 CM/SEC
BH CV XLRA MEAS RIGHT PROX SCLA PSV: 144 CM/SEC
BH CV XLRA MEAS RIGHT VERTEBRAL A EDV: 18.3 CM/SEC
BH CV XLRA MEAS RIGHT VERTEBRAL A PSV: 57.6 CM/SEC
LEFT ARM BP: NORMAL MMHG
RIGHT ARM BP: NORMAL MMHG
TSH SERPL DL<=0.05 MIU/L-ACNC: 1.13 UIU/ML (ref 0.27–4.2)

## 2023-08-14 PROCEDURE — 84443 ASSAY THYROID STIM HORMONE: CPT | Performed by: INTERNAL MEDICINE

## 2023-08-14 PROCEDURE — 3075F SYST BP GE 130 - 139MM HG: CPT | Performed by: INTERNAL MEDICINE

## 2023-08-14 PROCEDURE — 99214 OFFICE O/P EST MOD 30 MIN: CPT | Performed by: INTERNAL MEDICINE

## 2023-08-14 PROCEDURE — 3079F DIAST BP 80-89 MM HG: CPT | Performed by: INTERNAL MEDICINE

## 2023-08-14 RX ORDER — DOXYCYCLINE HYCLATE 100 MG/1
100 CAPSULE ORAL 2 TIMES DAILY
Qty: 20 CAPSULE | Refills: 0 | Status: SHIPPED | OUTPATIENT
Start: 2023-08-14

## 2023-08-14 NOTE — PROGRESS NOTES
"Pipe Watkins  1940  [id number]  Patient Care Team:  Teddy Paredes MD as PCP - General (Internal Medicine)  Parish Craft III, MD as Cardiologist (Cardiology)    Pipe Watkins is a 83 y.o. male here today for follow up.     This patient is accompanied by their self who contributes to the history of their care.    Chief Complaint:    Chief Complaint   Patient presents with    Insect Bite     Rt hip area        History of Present Illness:  I have reviewed and/or updated the patient's past medical, past surgical, family, social history, problem list and allergies as appropriate.     Feels he was bitten on hip/abdomen. He is concerned as his wife just returned from [place] with lesions. aNterior iliac region with increase induration and swelling. They have scoured their abode and have not seen insects. They have a professional  visiting this week. Denies fevers chills.  No history of bedbugs.    Additionally he has been highly intolerant of heat lately.  Sweats profusely.  He is on appropriate beta-blocker.  He does have a history of SVT.  No recent thyroid function studies.  No change in his hair skin or nails.  Weight loss or weight gain.    Review of Systems   Constitutional: Negative.    HENT: Negative.     Endocrine: Positive for heat intolerance.   Skin:  Positive for pallor and skin lesions.     Vitals:    08/14/23 1223   BP: 132/86   Pulse: 76   SpO2: 98%   Weight: 92.9 kg (204 lb 12.8 oz)   Height: 177 cm (69.69\")     Body mass index is 29.65 kg/mý.    Physical Exam  Vitals and nursing note reviewed.   Constitutional:       General: He is not in acute distress.     Appearance: He is well-developed. He is not diaphoretic.   HENT:      Head: Normocephalic and atraumatic.      Right Ear: External ear normal.      Left Ear: External ear normal.      Mouth/Throat:      Pharynx: No oropharyngeal exudate.   Eyes:      General: No scleral icterus.        Right eye: No discharge.      " Conjunctiva/sclera: Conjunctivae normal.   Neck:      Thyroid: No thyromegaly.      Vascular: No JVD.      Trachea: No tracheal deviation.   Cardiovascular:      Rate and Rhythm: Normal rate and regular rhythm.      Heart sounds: Normal heart sounds.      Comments: PMI nondisplaced  Pulmonary:      Effort: Pulmonary effort is normal.      Breath sounds: Normal breath sounds. No wheezing or rales.   Abdominal:      General: Bowel sounds are normal.      Palpations: Abdomen is soft.      Tenderness: There is no abdominal tenderness. There is no guarding or rebound.   Musculoskeletal:      Cervical back: Normal range of motion and neck supple.   Lymphadenopathy:      Cervical: No cervical adenopathy.   Skin:     General: Skin is warm and dry.      Capillary Refill: Capillary refill takes less than 2 seconds.      Coloration: Skin is not pale.      Findings: Lesion present. No rash.      Comments: Folliular lesion right inguainal rgions slight surrounding indurations. No cellulitc changes.    Neurological:      Mental Status: He is alert and oriented to person, place, and time.      Motor: No abnormal muscle tone.      Coordination: Coordination normal.   Psychiatric:         Mood and Affect: Mood normal.         Behavior: Behavior normal.         Judgment: Judgment normal.       Procedures    Results Review:    None    Assessment/Plan:    Problem List Items Addressed This Visit          Musculoskeletal and Injuries    Insect bite of hip    Current Assessment & Plan     Clearly was bitten by something sort of insect.  He may have the beginnings potential developing cellulitic changes.  I placed him on doxycycline x10 days.  Should he develop fevers chills worsening swelling he should schedule back or proceed to the emergency room.          Other Visit Diagnoses       Heat intolerance    -  Primary    Given his worsening intolerance have recommended thyroid function studies.  He needs a beta-blocker for his SVT.     Relevant Orders    TSH Rfx On Abnormal To Free T4            Plan of care reviewed with patient at the conclusion of today's visit. Education was provided regarding diagnosis and management.  Patient verbalizes understanding of and agreement with management plan.    Return for Next scheduled follow up.    Teddy Paredes MD      Please note than portions of this note were completed wt a Voice Recognition Program

## 2023-08-14 NOTE — ASSESSMENT & PLAN NOTE
Clearly was bitten by something sort of insect.  He may have the beginnings potential developing cellulitic changes.  I placed him on doxycycline x10 days.  Should he develop fevers chills worsening swelling he should schedule back or proceed to the emergency room.

## 2023-08-21 RX ORDER — VALSARTAN 40 MG/1
40 TABLET ORAL DAILY
Qty: 90 TABLET | Refills: 2 | Status: SHIPPED | OUTPATIENT
Start: 2023-08-21

## 2023-09-14 ENCOUNTER — OFFICE VISIT (OUTPATIENT)
Dept: FAMILY MEDICINE CLINIC | Facility: CLINIC | Age: 83
End: 2023-09-14
Payer: MEDICARE

## 2023-09-14 VITALS
OXYGEN SATURATION: 98 % | BODY MASS INDEX: 29.52 KG/M2 | HEIGHT: 70 IN | DIASTOLIC BLOOD PRESSURE: 86 MMHG | SYSTOLIC BLOOD PRESSURE: 142 MMHG | HEART RATE: 68 BPM | WEIGHT: 206.2 LBS

## 2023-09-14 DIAGNOSIS — Z00.00 MEDICARE ANNUAL WELLNESS VISIT, SUBSEQUENT: ICD-10-CM

## 2023-09-14 DIAGNOSIS — I10 ESSENTIAL HYPERTENSION: ICD-10-CM

## 2023-09-14 DIAGNOSIS — I48.0 PAROXYSMAL ATRIAL FIBRILLATION: ICD-10-CM

## 2023-09-14 DIAGNOSIS — E78.2 MIXED HYPERLIPIDEMIA: Primary | ICD-10-CM

## 2023-09-14 DIAGNOSIS — F41.9 ANXIETY: ICD-10-CM

## 2023-09-14 RX ORDER — ESCITALOPRAM OXALATE 10 MG/1
10 TABLET ORAL DAILY
Qty: 90 TABLET | Refills: 2 | Status: SHIPPED | OUTPATIENT
Start: 2023-09-14

## 2023-09-14 NOTE — ASSESSMENT & PLAN NOTE
How Severe Is Your Skin Lesion?: moderate Hypertension is improving with treatment.  Continue current treatment regimen.  Dietary sodium restriction.  Regular aerobic exercise.  Attention to anxiety with Lexapro counseling as this seems to have exacerbated his blood pressure at times  Blood pressure will be reassessed in 3 months.   Have Your Skin Lesions Been Treated?: not been treated Is This A New Presentation, Or A Follow-Up?: Skin Lesions

## 2023-09-14 NOTE — ASSESSMENT & PLAN NOTE
Lipid abnormalities are unchanged.  Nutritional counseling was provided.  Lipids will be reassessed in 6 months. Coninues on Pescatarian diet

## 2023-09-14 NOTE — PROGRESS NOTES
The ABCs of the Annual Wellness Visit  Subsequent Medicare Wellness Visit    Subjective    Pipe Watkins is a 83 y.o. male who presents for a Subsequent Medicare Wellness Visit.    The following portions of the patient's history were reviewed and   updated as appropriate: He  has a past medical history of Abnormal ECG, Alcohol abuse, Arrhythmia, Arthritis, Depression, Hyperlipidemia, Hypertension, Osteoarthritis, Paroxysmal atrial fibrillation (12/22/2021), Sick sinus syndrome, SVT (supraventricular tachycardia), Wears hearing aid, and Wears prescription eyeglasses.  He does not have any pertinent problems on file.  He  has a past surgical history that includes Knee arthroscopy w/ meniscal repair (Left); Pacemaker Implantation (01/13/2015); Colonoscopy; Cardiac electrophysiology procedure (N/A, 1/18/2017); Tonsillectomy; Ablation of dysrhythmic focus; and Insert / replace / remove pacemaker.  His family history includes Heart attack in his father; Hypertension in his mother.  He  reports that he has never smoked. He has never used smokeless tobacco. He reports that he does not drink alcohol and does not use drugs.  Current Outpatient Medications   Medication Sig Dispense Refill    apixaban (Eliquis) 5 MG tablet tablet Take 1 tablet by mouth 2 (Two) Times a Day. 60 tablet 2    Cholecalciferol (VITAMIN D3) 125 MCG (5000 UT) capsule capsule Take 4,000 Units by mouth Daily.      magnesium oxide (MAG-OX) 400 MG tablet Take 1 tablet by mouth Daily. 90 tablet 3    nebivolol (Bystolic) 2.5 MG tablet Take 1 tablet by mouth Daily. 90 tablet 1    Omega-3 Fatty Acids (fish oil) 1000 MG capsule capsule Take  by mouth Every Night.      Ubiquinol 100 MG capsule Take 100 mg by mouth Daily.      valsartan (Diovan) 40 MG tablet Take 1 tablet by mouth Daily. 90 tablet 2    Vitamin A 2400 MCG (8000 UT) tablet Take  by mouth.      vitamin B-12 (CYANOCOBALAMIN) 1000 MCG tablet Take 1 tablet by mouth Daily.      vitamin C (ASCORBIC  ACID) 250 MG tablet Take 4 tablets by mouth Daily.      vitamin E 100 UNIT capsule Take 4 capsules by mouth Daily.      Zinc 50 MG capsule Take 50 mg by mouth Daily.      escitalopram (Lexapro) 10 MG tablet Take 1 tablet by mouth Daily. 90 tablet 2     No current facility-administered medications for this visit.     Current Outpatient Medications on File Prior to Visit   Medication Sig    apixaban (Eliquis) 5 MG tablet tablet Take 1 tablet by mouth 2 (Two) Times a Day.    Cholecalciferol (VITAMIN D3) 125 MCG (5000 UT) capsule capsule Take 4,000 Units by mouth Daily.    magnesium oxide (MAG-OX) 400 MG tablet Take 1 tablet by mouth Daily.    nebivolol (Bystolic) 2.5 MG tablet Take 1 tablet by mouth Daily.    Omega-3 Fatty Acids (fish oil) 1000 MG capsule capsule Take  by mouth Every Night.    Ubiquinol 100 MG capsule Take 100 mg by mouth Daily.    valsartan (Diovan) 40 MG tablet Take 1 tablet by mouth Daily.    Vitamin A 2400 MCG (8000 UT) tablet Take  by mouth.    vitamin B-12 (CYANOCOBALAMIN) 1000 MCG tablet Take 1 tablet by mouth Daily.    vitamin C (ASCORBIC ACID) 250 MG tablet Take 4 tablets by mouth Daily.    vitamin E 100 UNIT capsule Take 4 capsules by mouth Daily.    Zinc 50 MG capsule Take 50 mg by mouth Daily.    [DISCONTINUED] amLODIPine (NORVASC) 5 MG tablet Daily.    [DISCONTINUED] doxycycline (VIBRAMYCIN) 100 MG capsule Take 1 capsule by mouth 2 (Two) Times a Day.     No current facility-administered medications on file prior to visit.     He is allergic to other, ace inhibitors, duloxetine, lipitor [atorvastatin], paroxetine, penicillins, beta adrenergic blockers, and diltiazem..    Compared to one year ago, the patient feels his physical   health is the same. better    Compared to one year ago, the patient feels his mental   health is the same. Worse     Recent Hospitalizations:  He was not admitted to the hospital during the last year.       Current Medical Providers:  Patient Care Team:  Reggie  Teddy AGUAYO MD as PCP - General (Internal Medicine)  Parish Craft III, MD as Cardiologist (Cardiology)    Outpatient Medications Prior to Visit   Medication Sig Dispense Refill    apixaban (Eliquis) 5 MG tablet tablet Take 1 tablet by mouth 2 (Two) Times a Day. 60 tablet 2    Cholecalciferol (VITAMIN D3) 125 MCG (5000 UT) capsule capsule Take 4,000 Units by mouth Daily.      magnesium oxide (MAG-OX) 400 MG tablet Take 1 tablet by mouth Daily. 90 tablet 3    nebivolol (Bystolic) 2.5 MG tablet Take 1 tablet by mouth Daily. 90 tablet 1    Omega-3 Fatty Acids (fish oil) 1000 MG capsule capsule Take  by mouth Every Night.      Ubiquinol 100 MG capsule Take 100 mg by mouth Daily.      valsartan (Diovan) 40 MG tablet Take 1 tablet by mouth Daily. 90 tablet 2    Vitamin A 2400 MCG (8000 UT) tablet Take  by mouth.      vitamin B-12 (CYANOCOBALAMIN) 1000 MCG tablet Take 1 tablet by mouth Daily.      vitamin C (ASCORBIC ACID) 250 MG tablet Take 4 tablets by mouth Daily.      vitamin E 100 UNIT capsule Take 4 capsules by mouth Daily.      Zinc 50 MG capsule Take 50 mg by mouth Daily.      amLODIPine (NORVASC) 5 MG tablet Daily.      doxycycline (VIBRAMYCIN) 100 MG capsule Take 1 capsule by mouth 2 (Two) Times a Day. 20 capsule 0     No facility-administered medications prior to visit.       No opioid medication identified on active medication list. I have reviewed chart for other potential  high risk medication/s and harmful drug interactions in the elderly.        Aspirin is not on active medication list.  Aspirin use is not indicated based on review of current medical condition/s. Risk of harm outweighs potential benefits.  .    Patient Active Problem List   Diagnosis    Pacemaker    Essential hypertension    Vasovagal syncope    AVNRT (AV praveen re-entry tachycardia)    Sick sinus syndrome    Hyperlipidemia    Osteoarthritis    Depression    Alcohol abuse    Paroxysmal atrial fibrillation    Allergic rhinitis    Bilateral  "myopia    Bilateral presbyopia    Cataract, nuclear sclerotic senile    Degenerative arthritis of left knee    Dry eyes    Hip pain    History of alcoholism    Hypercoagulable state    Insufficiency of tear film of both eyes    Nonexudative age-related macular degeneration, bilateral, early dry stage    Paget's disease of bony pelvis    Posterior vitreous detachment    S/P total knee arthroplasty    Secondary cataract    Transient ischemic attack    Vitreous prolapse of left eye    Chronic depression    Osteoarthritis    SVT (supraventricular tachycardia)    Excess skin of eyelid    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  ACP discussion was held with the patient during this visit. Patient has an advance directive in EMR which is still valid.      Objective    Vitals:    09/14/23 1104   BP: 142/86   Pulse: 68   SpO2: 98%   Weight: 93.5 kg (206 lb 3.2 oz)   Height: 177 cm (69.69\")     Estimated body mass index is 29.85 kg/m² as calculated from the following:    Height as of this encounter: 177 cm (69.69\").    Weight as of this encounter: 93.5 kg (206 lb 3.2 oz).    BMI is >= 25 and <30. (Overweight) The following options were offered after discussion;: weight loss educational material (shared in after visit summary), exercise counseling/recommendations, and nutrition counseling/recommendations    BMI is >= 25 and <30. (Overweight) The following options were offered after discussion;: nutrition counseling/recommendations  Recommend nutritional counseling and Mediterranean diet. Per  min aerobic physical activity weekly      Does the patient have evidence of cognitive impairment? No          HEALTH RISK ASSESSMENT    Smoking Status:  Social History     Tobacco Use   Smoking Status Never "   Smokeless Tobacco Never     Alcohol Consumption:  Social History     Substance and Sexual Activity   Alcohol Use No    Comment: recovered alcoholic-13 YEAR SOBER2015 12 years sober      Fall Risk Screen:    JAIDEN Fall Risk Assessment was completed, and patient is at LOW risk for falls.Assessment completed on:2023    Depression Screenin/14/2023    11:23 AM   PHQ-2/PHQ-9 Depression Screening   Little Interest or Pleasure in Doing Things 0-->not at all   Feeling Down, Depressed or Hopeless 0-->not at all   PHQ-9: Brief Depression Severity Measure Score 0       Health Habits and Functional and Cognitive Screenin/14/2023    11:20 AM   Functional & Cognitive Status   Do you have difficulty preparing food and eating? No   Do you have difficulty bathing yourself, getting dressed or grooming yourself? No   Do you have difficulty using the toilet? No   Do you have difficulty moving around from place to place? No   Do you have trouble with steps or getting out of a bed or a chair? No   Current Diet Well Balanced Diet   Dental Exam Up to date   Eye Exam Up to date   Exercise (times per week) 7 times per week   Current Exercises Include Walking;Stationary Bicycling/Spin Class   Do you need help using the phone?  No   Are you deaf or do you have serious difficulty hearing?  Yes   Do you need help to go to places out of walking distance? No   Do you need help shopping? No   Do you need help preparing meals?  No   Do you need help with housework?  No   Do you need help with laundry? No   Do you need help taking your medications? No   Do you need help managing money? No   Do you ever drive or ride in a car without wearing a seat belt? No   Have you felt unusual stress, anger or loneliness in the last month? Yes   Who do you live with? Spouse   If you need help, do you have trouble finding someone available to you? No   Have you been bothered in the last four weeks by sexual problems? No   Do you have  difficulty concentrating, remembering or making decisions? No       Age-appropriate Screening Schedule:  Refer to the list below for future screening recommendations based on patient's age, sex and/or medical conditions. Orders for these recommended tests are listed in the plan section. The patient has been provided with a written plan.    Health Maintenance   Topic Date Due    COVID-19 Vaccine (7 - Pfizer series) 01/05/2023    INFLUENZA VACCINE  10/01/2023    BMI FOLLOWUP  06/14/2024    LIPID PANEL  06/15/2024    ANNUAL WELLNESS VISIT  09/14/2024    TDAP/TD VACCINES (3 - Td or Tdap) 05/19/2032    Pneumococcal Vaccine 65+  Completed    ZOSTER VACCINE  Completed                  CMS Preventative Services Quick Reference  Risk Factors Identified During Encounter  Depression/Dysphoria: Prescribed new antidepressant medication treatment. Follow up visit planned.  Dental Screening Recommended  Vision Screening Recommended  The above risks/problems have been discussed with the patient.  Pertinent information has been shared with the patient in the After Visit Summary.  An After Visit Summary and PPPS were made available to the patient.    Follow Up:   Next Medicare Wellness visit to be scheduled in 1 year.       Additional E&M Note during same encounter follows:  Patient has multiple medical problems which are significant and separately identifiable that require additional work above and beyond the Medicare Wellness Visit.      Chief Complaint  Medicare Wellness-subsequent and Anxiety    Subjective        HPI  Pipe Watkins is also being seen today for htn and dyslipidemia  He remains on liquids for his paroxysmal atrial fibrillation.  He does have a history of sick sinus syndrome.  He continues on valsartan 40 mg daily and Bystolic 2.5 mg daily. His bp at home well. Feels  anxiety has worsened it. Can run higher at home. Can be 116 -166 systiollically. He fels anxious this am    He is on no cholesterol medicines he is  "describing to a pescatarian diet.  Continues to work with a counselor for anxiety. He has not tried lexapro. Previous on high dose devenlafaxine.  Denies any HI or SI.    Review of Systems   Constitutional: Negative.    HENT: Negative.     Respiratory: Negative.     Cardiovascular: Negative.    Gastrointestinal: Negative.    Genitourinary: Negative.    Musculoskeletal: Negative.    Skin: Negative.    Neurological: Negative.    Hematological: Negative.  He reports his carotid duplex has improved with dieting.    Objective   Vital Signs:  /86   Pulse 68   Ht 177 cm (69.69\")   Wt 93.5 kg (206 lb 3.2 oz)   SpO2 98%   BMI 29.85 kg/m²     Physical Exam  Vitals and nursing note reviewed.   Constitutional:       General: He is not in acute distress.     Appearance: He is well-developed. He is not diaphoretic.   HENT:      Head: Normocephalic and atraumatic.      Right Ear: Tympanic membrane and external ear normal.      Left Ear: External ear normal.      Mouth/Throat:      Mouth: Mucous membranes are moist.      Pharynx: Oropharynx is clear. No oropharyngeal exudate.   Eyes:      General: No scleral icterus.        Right eye: No discharge.         Left eye: No discharge.      Extraocular Movements: Extraocular movements intact.      Conjunctiva/sclera: Conjunctivae normal.   Neck:      Thyroid: No thyromegaly.      Vascular: No carotid bruit or JVD.      Trachea: No tracheal deviation.   Cardiovascular:      Rate and Rhythm: Normal rate and regular rhythm.      Heart sounds: Normal heart sounds.      Comments: PMI nondisplaced  Pulmonary:      Effort: Pulmonary effort is normal.      Breath sounds: Normal breath sounds. No wheezing or rales.   Abdominal:      General: Bowel sounds are normal. There is no distension.      Palpations: Abdomen is soft. There is no mass.      Tenderness: There is no abdominal tenderness. There is no guarding or rebound.   Musculoskeletal:         General: No swelling or deformity. "      Cervical back: Normal range of motion and neck supple.      Right lower leg: No edema.      Left lower leg: No edema.   Lymphadenopathy:      Cervical: No cervical adenopathy.   Skin:     General: Skin is warm and dry.      Capillary Refill: Capillary refill takes less than 2 seconds.      Coloration: Skin is not pale.      Findings: No rash.   Neurological:      Mental Status: He is alert and oriented to person, place, and time.      Motor: No abnormal muscle tone.      Coordination: Coordination normal.   Psychiatric:         Mood and Affect: Mood normal.         Behavior: Behavior normal.         Judgment: Judgment normal.                       Assessment and Plan   Diagnoses and all orders for this visit:    1. Mixed hyperlipidemia (Primary)  Assessment & Plan:  Lipid abnormalities are unchanged.  Nutritional counseling was provided.  Lipids will be reassessed in 6 months. Coninues on Pescatarian diet        2. Essential hypertension  Assessment & Plan:  Hypertension is improving with treatment.  Continue current treatment regimen.  Dietary sodium restriction.  Regular aerobic exercise.  Attention to anxiety with Lexapro counseling as this seems to have exacerbated his blood pressure at times  Blood pressure will be reassessed in 3 months.      3. Paroxysmal atrial fibrillation  Assessment & Plan:  Pacemaker in place.  Remains on Bystolic and Eliquis.      4. Medicare annual wellness visit, subsequent    5. Anxiety  Assessment & Plan:  Countinue counselling. Trial of lexapro 5 mg x 1 week then 10 mg daily      Other orders  -     escitalopram (Lexapro) 10 MG tablet; Take 1 tablet by mouth Daily.  Dispense: 90 tablet; Refill: 2             Follow Up   Return in about 3 months (around 12/14/2023) for lexapro.  Patient was given instructions and counseling regarding his condition or for health maintenance advice. Please see specific information pulled into the AVS if appropriate.

## 2023-10-09 RX ORDER — DESVENLAFAXINE SUCCINATE 50 MG/1
50 TABLET, EXTENDED RELEASE ORAL DAILY
Qty: 90 TABLET | Refills: 1 | Status: SHIPPED | OUTPATIENT
Start: 2023-10-09

## 2023-10-10 RX ORDER — NEBIVOLOL 2.5 MG/1
2.5 TABLET ORAL DAILY
Qty: 90 TABLET | Refills: 1 | Status: SHIPPED | OUTPATIENT
Start: 2023-10-10

## 2023-10-12 ENCOUNTER — TELEPHONE (OUTPATIENT)
Dept: FAMILY MEDICINE CLINIC | Facility: CLINIC | Age: 83
End: 2023-10-12
Payer: MEDICARE

## 2023-10-12 RX ORDER — DESVENLAFAXINE 25 MG/1
25 TABLET, EXTENDED RELEASE ORAL DAILY
Qty: 30 TABLET | Refills: 6 | Status: SHIPPED | OUTPATIENT
Start: 2023-10-12

## 2023-10-12 NOTE — TELEPHONE ENCOUNTER
Pharmacy Name:  [id number] - [facility] [address] AT [facility] & [address] - [phone] [facility] [phone] FX     Reference Number (if applicable): N/A     Pharmacy representative name: BETO     Pharmacy representative phone number: [phone]     What medication are you calling in regards to:     desvenlafaxine (Pristiq) 50 MG 24 hr tablet     What question does the pharmacy have: CAN THIS MEDICATION BE LOWERED IN DOSAGE?    Who is the provider that prescribed the medication: Teddy Paredes MD     Additional notes: PATIENT STATES THE MEDICATION IS TOO STRONG AND IS HARD TO FUNCTION ON. PATIENT HAS TAKEN IN THE PAST AND WORKED HIS WAY UP TO 25 MG .     THE PATIENT WOULD LIKE TO REQUEST ENOUGH MEDICATION FOR A WEEK OR TWO AT A LOWER DOSAGE IN ORDER FOR HIM TO GET ADJUSTED TO THIS MEDICATION BEFORE STARTING THE 50 MG

## 2023-10-20 ENCOUNTER — OFFICE VISIT (OUTPATIENT)
Dept: FAMILY MEDICINE CLINIC | Facility: CLINIC | Age: 83
End: 2023-10-20
Payer: MEDICARE

## 2023-10-20 VITALS
SYSTOLIC BLOOD PRESSURE: 160 MMHG | DIASTOLIC BLOOD PRESSURE: 88 MMHG | HEIGHT: 70 IN | BODY MASS INDEX: 29.52 KG/M2 | WEIGHT: 206.2 LBS

## 2023-10-20 DIAGNOSIS — F41.9 ANXIETY: Primary | ICD-10-CM

## 2023-10-20 PROCEDURE — 3079F DIAST BP 80-89 MM HG: CPT | Performed by: FAMILY MEDICINE

## 2023-10-20 PROCEDURE — 99213 OFFICE O/P EST LOW 20 MIN: CPT | Performed by: FAMILY MEDICINE

## 2023-10-20 PROCEDURE — 3077F SYST BP >= 140 MM HG: CPT | Performed by: FAMILY MEDICINE

## 2023-10-20 RX ORDER — DULOXETIN HYDROCHLORIDE 20 MG/1
20 CAPSULE, DELAYED RELEASE ORAL DAILY
Qty: 30 CAPSULE | Refills: 1 | Status: SHIPPED | OUTPATIENT
Start: 2023-10-20 | End: 2023-10-30 | Stop reason: SINTOL

## 2023-10-20 NOTE — PROGRESS NOTES
Established Patient Office Visit      Patient Name: Pipe Watkins  : 1940   MRN: [id number]   Care Team: Patient Care Team:  Teddy Paredes MD as PCP - General (Internal Medicine)  Parish Craft III, MD as Cardiologist (Cardiology)    Chief Complaint:    Chief Complaint   Patient presents with    Anxiety     Pt co anxiety       History of Present Illness: Pipe Watkins is a 83 y.o. male who is here today for chief complaint.    HPI    He was recently started on desvenlafaxine for anxiety, saw urgent care on 10/14 for high blood pressure, was instructed to hold the desvenlafaxine until his follow-up.  He was originally started on Lexapro but had some somnolence related to that.  Originally started on 50 mg daily of Pristiq, called and wanted to change to the lower 25 mg daily dosage.    Long history of anxiety, on many medications. Long history of alcohol abuse, long-term sober. Was on desvenlafaxine 100mg last year up until last October. Started seeing a therapist last November-December, has continued since then. Reports he has had some things come out from his childhood trauma that have caused him some increased anxiety and depression.    Had episode last Saturday when he got up to urinate ~5am, was standing to urinate and suddenly got dizzy, had to sit down. Resolved spontaneously after a few seconds. Went to [facility], was told to see his PCP.    This patient is accompanied by their self who contributes to the history of their care.    The following portions of the patient's history were reviewed and updated as appropriate: allergies, current medications, past family history, past medical history, past social history, past surgical history and problem list.    Subjective      Review of Systems:   Review of Systems - See HPI    Past Medical History:   Past Medical History:   Diagnosis Date    Abnormal ECG     Alcohol abuse     with no consumption for 12 years     Arrhythmia     Arthritis     Depression      Hyperlipidemia     With Statin intolerance      Hypertension     Osteoarthritis     Paroxysmal atrial fibrillation 12/22/2021    Sick sinus syndrome     SVT (supraventricular tachycardia)     Wears hearing aid     Wears prescription eyeglasses        Past Surgical History:   Past Surgical History:   Procedure Laterality Date    ABLATION OF DYSRHYTHMIC FOCUS      CARDIAC ELECTROPHYSIOLOGY PROCEDURE N/A 1/18/2017    Procedure: Ablation AVNRT;  Surgeon: Gennaro Kirk MD;  Location: [facility] INVASIVE LOCATION;  Service:     COLONOSCOPY      INSERT / REPLACE / REMOVE PACEMAKER      KNEE ARTHROSCOPY W/ MENISCAL REPAIR Left     PACEMAKER IMPLANTATION  01/13/2015    [facility] presentation  with supraventricular tachycardia at a rate of 180, converted with 12 mg of adenosine     TONSILLECTOMY         Family History:   Family History   Problem Relation Age of Onset    Hypertension Mother     Heart attack Father        Social History:   Social History     Socioeconomic History    Marital status:    Tobacco Use    Smoking status: Never    Smokeless tobacco: Never   Substance and Sexual Activity    Alcohol use: No     Comment: 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     Drug use: No    Sexual activity: Yes     Partners: Female       Tobacco History:   Social History     Tobacco Use   Smoking Status Never   Smokeless Tobacco Never       Medications:     Current Outpatient Medications:     apixaban (Eliquis) 5 MG tablet tablet, Take 1 tablet by mouth 2 (Two) Times a Day., Disp: 60 tablet, Rfl: 2    Cholecalciferol (VITAMIN D3) 125 MCG (5000 UT) capsule capsule, Take 4,000 Units by mouth Daily., Disp: , Rfl:     magnesium oxide (MAG-OX) 400 MG tablet, Take 1 tablet by mouth Daily., Disp: 90 tablet, Rfl: 3    nebivolol (Bystolic) 2.5 MG tablet, Take 1 tablet by mouth Daily., Disp: 90 tablet, Rfl: 1    Omega-3 Fatty Acids (fish oil) 1000 MG capsule capsule, Take  by mouth Every Night., Disp: ,  "Rfl:     Ubiquinol 100 MG capsule, Take 100 mg by mouth Daily., Disp: , Rfl:     valsartan (Diovan) 40 MG tablet, Take 1 tablet by mouth Daily., Disp: 90 tablet, Rfl: 2    Vitamin A 2400 MCG (8000 UT) tablet, Take  by mouth., Disp: , Rfl:     vitamin B-12 (CYANOCOBALAMIN) 1000 MCG tablet, Take 1 tablet by mouth Daily., Disp: , Rfl:     vitamin C (ASCORBIC ACID) 250 MG tablet, Take 4 tablets by mouth Daily., Disp: , Rfl:     vitamin E 100 UNIT capsule, Take 4 capsules by mouth Daily., Disp: , Rfl:     Zinc 50 MG capsule, Take 50 mg by mouth Daily., Disp: , Rfl:     FLUoxetine (PROzac) 10 MG capsule, Take 1 capsule by mouth Daily., Disp: 30 capsule, Rfl: 0    Allergies:   Allergies   Allergen Reactions    Other Cough    Ace Inhibitors      Cough     Lipitor [Atorvastatin]      Severe depression    Paroxetine Other (See Comments)    Penicillins Hives    Beta Adrenergic Blockers Unknown (See Comments)    Diltiazem Unknown (See Comments)       Objective   Objective     Physical Exam:  Vital Signs:   Vitals:    10/20/23 1444   BP: 160/88   BP Location: Left arm   Patient Position: Sitting   Cuff Size: Adult   Weight: 93.5 kg (206 lb 3.2 oz)   Height: 177 cm (69.69\")     Body mass index is 29.85 kg/m².     Physical Exam  Nursing note reviewed  Const: NAD, A&Ox4, Pleasant, Cooperative  Eyes: EOMI, no conjunctivitis  ENT: No nasal discharge present, neck supple  Cardiac: Regular rate and rhythm, no cyanosis  Resp: Respiratory rate within normal limits, no increased work of breathing, no audible wheezing or retractions noted  GI: No distention or ascites  MSK: Motor and sensation grossly intact in bilateral upper extremities  Neurologic: CN II-XII grossly intact  Psych: Appropriate mood and behavior.  Skin: Warm, dry  Procedures/Radiology     Procedures  No radiology results for the last 7 days     Assessment & Plan   Assessment / Plan      Assessment/Plan:   Problems Addressed This Visit  Diagnoses and all orders for this " visit:    1. Anxiety (Primary)  -     Salivary Cortisol, MS - Saliva, Oral Cavity; Future  -     Discontinue: DULoxetine (Cymbalta) 20 MG capsule; Take 1 capsule by mouth Daily.  Dispense: 30 capsule; Refill: 1      Problem List Items Addressed This Visit          Mental Health    Anxiety - Primary    Relevant Orders    Salivary Cortisol, MS - Saliva, Oral Cavity (Completed)     No allergy to duloxetine, reports he has not been on this previously. Genetic testing has suggested he has normal metabolism for this, desvenlafaxine.    Patient Instructions   Have your salivary cortisol level checked in the morning next week    Follow Up:   Return in about 6 weeks (around 12/1/2023) for anxiety with PCP.    DO VIANNEY Metzger RD  [facility]  [address]  [facility] [id number]  Fax [phone]  Phone [phone]

## 2023-10-24 ENCOUNTER — TELEPHONE (OUTPATIENT)
Dept: FAMILY MEDICINE CLINIC | Facility: CLINIC | Age: 83
End: 2023-10-24
Payer: MEDICARE

## 2023-10-24 DIAGNOSIS — F41.9 ANXIETY: Primary | ICD-10-CM

## 2023-10-24 NOTE — TELEPHONE ENCOUNTER
Duloxetine (same with the other SNRIs) does not cause a fib, although it can give a slight fluttering sensation early in treatment course. But he can rest assured there is no increased cardiac risk from the duloxetine.

## 2023-10-24 NOTE — TELEPHONE ENCOUNTER
Patient is having difficulty sleeping at night. His insomnia is increasing. He can't sleep because he can feel that his heart is fluttering. He feels that he should be placed on a different medication.   He did say it has helped his anxiety. He would like to try a SSRI instead.

## 2023-10-24 NOTE — TELEPHONE ENCOUNTER
Pt called stating the Cymbalta is cause Afib. He looked up on webmd and it was a side effect that he needs to be switched to another medication. Also states he notices it more when he lays on his right side he can tell the heart fluttering is more noticeable.

## 2023-10-25 ENCOUNTER — LAB (OUTPATIENT)
Dept: LAB | Facility: HOSPITAL | Age: 83
End: 2023-10-25
Payer: MEDICARE

## 2023-10-25 DIAGNOSIS — F41.9 ANXIETY: ICD-10-CM

## 2023-10-25 PROCEDURE — 82533 TOTAL CORTISOL: CPT

## 2023-10-29 LAB — CORTIS SAL-MCNC: 0.61 UG/DL

## 2023-10-30 RX ORDER — FLUOXETINE 10 MG/1
10 CAPSULE ORAL DAILY
Qty: 30 CAPSULE | Refills: 0 | Status: SHIPPED | OUTPATIENT
Start: 2023-10-30

## 2023-10-31 ENCOUNTER — OFFICE VISIT (OUTPATIENT)
Dept: FAMILY MEDICINE CLINIC | Facility: CLINIC | Age: 83
End: 2023-10-31
Payer: MEDICARE

## 2023-10-31 VITALS
WEIGHT: 204 LBS | SYSTOLIC BLOOD PRESSURE: 138 MMHG | DIASTOLIC BLOOD PRESSURE: 70 MMHG | HEIGHT: 70 IN | HEART RATE: 88 BPM | OXYGEN SATURATION: 95 % | BODY MASS INDEX: 29.2 KG/M2

## 2023-10-31 DIAGNOSIS — F41.9 ANXIETY: Primary | ICD-10-CM

## 2023-10-31 PROCEDURE — 3075F SYST BP GE 130 - 139MM HG: CPT | Performed by: FAMILY MEDICINE

## 2023-10-31 PROCEDURE — 99213 OFFICE O/P EST LOW 20 MIN: CPT | Performed by: FAMILY MEDICINE

## 2023-10-31 PROCEDURE — 3078F DIAST BP <80 MM HG: CPT | Performed by: FAMILY MEDICINE

## 2023-10-31 NOTE — PROGRESS NOTES
Established Patient Office Visit      Patient Name: Pipe Watkins  : 1940   MRN: [id number]   Care Team: Patient Care Team:  Teddy Paredes MD as PCP - General (Internal Medicine)  Parish Craft III, MD as Cardiologist (Cardiology)    Chief Complaint:    Chief Complaint   Patient presents with    Anxiety       History of Present Illness: Pipe Watkins is a 83 y.o. male who is here today for chief complaint.    HPI    Answers submitted by the patient for this visit:  Primary Reason for Visit (Submitted on 10/31/2023)  What is the primary reason for your visit?: Other  Other (Submitted on 10/31/2023)  Please describe your symptoms.: anxiety,  Have you had these symptoms before?: Yes  How long have you been having these symptoms?: Greater than 2 weeks    Did not tolerate cymbalta (palpitations). Sent in fluoxetine, has not started this yet.  AM cortisol was 0.606, slightly high but generally ok.    This patient is accompanied by their self who contributes to the history of their care.    The following portions of the patient's history were reviewed and updated as appropriate: allergies, current medications, past family history, past medical history, past social history, past surgical history and problem list.    Subjective      Review of Systems:   Review of Systems - See HPI    Past Medical History:   Past Medical History:   Diagnosis Date    Abnormal ECG     Alcohol abuse     with no consumption for 12 years     Arrhythmia     Arthritis     Depression     Hyperlipidemia     With Statin intolerance      Hypertension     Osteoarthritis     Paroxysmal atrial fibrillation 2021    Sick sinus syndrome     SVT (supraventricular tachycardia)     Wears hearing aid     Wears prescription eyeglasses        Past Surgical History:   Past Surgical History:   Procedure Laterality Date    ABLATION OF DYSRHYTHMIC FOCUS      CARDIAC ELECTROPHYSIOLOGY PROCEDURE N/A 2017    Procedure: Ablation AVNRT;   Surgeon: Gennaro Kirk MD;  Location: [facility] INVASIVE LOCATION;  Service:     COLONOSCOPY      INSERT / REPLACE / REMOVE PACEMAKER      KNEE ARTHROSCOPY W/ MENISCAL REPAIR Left     PACEMAKER IMPLANTATION  01/13/2015    [place] ER presentation  with supraventricular tachycardia at a rate of 180, converted with 12 mg of adenosine     TONSILLECTOMY         Family History:   Family History   Problem Relation Age of Onset    Hypertension Mother     Heart attack Father        Social History:   Social History     Socioeconomic History    Marital status:    Tobacco Use    Smoking status: Never    Smokeless tobacco: Never   Substance and Sexual Activity    Alcohol use: No     Comment: 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     Drug use: No    Sexual activity: Yes     Partners: Female       Tobacco History:   Social History     Tobacco Use   Smoking Status Never   Smokeless Tobacco Never       Medications:     Current Outpatient Medications:     apixaban (Eliquis) 5 MG tablet tablet, Take 1 tablet by mouth 2 (Two) Times a Day., Disp: 60 tablet, Rfl: 2    Cholecalciferol (VITAMIN D3) 125 MCG (5000 UT) capsule capsule, Take 4,000 Units by mouth Daily., Disp: , Rfl:     FLUoxetine (PROzac) 10 MG capsule, Take 1 capsule by mouth Daily., Disp: 30 capsule, Rfl: 0    magnesium oxide (MAG-OX) 400 MG tablet, Take 1 tablet by mouth Daily., Disp: 90 tablet, Rfl: 3    nebivolol (Bystolic) 2.5 MG tablet, Take 1 tablet by mouth Daily., Disp: 90 tablet, Rfl: 1    Omega-3 Fatty Acids (fish oil) 1000 MG capsule capsule, Take  by mouth Every Night., Disp: , Rfl:     Ubiquinol 100 MG capsule, Take 100 mg by mouth Daily., Disp: , Rfl:     valsartan (Diovan) 40 MG tablet, Take 1 tablet by mouth Daily., Disp: 90 tablet, Rfl: 2    Vitamin A 2400 MCG (8000 UT) tablet, Take  by mouth., Disp: , Rfl:     vitamin B-12 (CYANOCOBALAMIN) 1000 MCG tablet, Take 1 tablet by mouth Daily., Disp: , Rfl:     vitamin C  "(ASCORBIC ACID) 250 MG tablet, Take 4 tablets by mouth Daily., Disp: , Rfl:     vitamin E 100 UNIT capsule, Take 4 capsules by mouth Daily., Disp: , Rfl:     Zinc 50 MG capsule, Take 50 mg by mouth Daily., Disp: , Rfl:     Allergies:   Allergies   Allergen Reactions    Other Cough    Ace Inhibitors      Cough     Lipitor [Atorvastatin]      Severe depression    Paroxetine Other (See Comments)    Penicillins Hives    Beta Adrenergic Blockers Unknown (See Comments)    Diltiazem Unknown (See Comments)       Objective   Objective     Physical Exam:  Vital Signs:   Vitals:    10/31/23 0918   BP: 138/70   BP Location: Left arm   Patient Position: Sitting   Cuff Size: Adult   Pulse: 88   SpO2: 95%   Weight: 92.5 kg (204 lb)   Height: 177 cm (69.69\")     Body mass index is 29.53 kg/m².     Physical Exam  Nursing note reviewed  Const: NAD, A&Ox4, Pleasant, Cooperative  Eyes: EOMI, no conjunctivitis  ENT: No nasal discharge present, neck supple  Cardiac: Regular rate and rhythm, no cyanosis  Resp: Respiratory rate within normal limits, no increased work of breathing, no audible wheezing or retractions noted  GI: No distention or ascites  MSK: Motor and sensation grossly intact in bilateral upper extremities  Neurologic: CN II-XII grossly intact  Psych: Appropriate mood and behavior.  Skin: Warm, dry  Procedures/Radiology     Procedures  No radiology results for the last 7 days     Assessment & Plan   Assessment / Plan      Assessment/Plan:   Problems Addressed This Visit  Diagnoses and all orders for this visit:    1. Anxiety (Primary)      Problem List Items Addressed This Visit          Mental Health    Anxiety - Primary     Will change him over to fluoxetine at very low dose. Discussed with him at length regarding steady-state effects vs transient side effects of medications, half-lives required to reach therapeutic levels, and expectations for psychiatric medications. If he experiences side effects that preclude him from " continuing, I would recommend referral to psychiatry.    There are no Patient Instructions on file for this visit.    Follow Up:   No follow-ups on file.        DO VIANNEY Metzger RD  [facility]  [address]  [facility] [address]  Fax [phone]  Phone [phone]

## 2023-11-09 ENCOUNTER — TELEPHONE (OUTPATIENT)
Dept: FAMILY MEDICINE CLINIC | Facility: CLINIC | Age: 83
End: 2023-11-09
Payer: MEDICARE

## 2023-11-09 NOTE — TELEPHONE ENCOUNTER
"  Caller: Pipe Watkins \"Rogelio\"    Relationship: Self    Best call back number: [phone]     What is the best time to reach you: ANYTIME    Who are you requesting to speak with (clinical staff, provider,  specific staff member): CLINICAL OR PROVIDER    Do you know the name of the person who called: NA    What was the call regarding: PATIENT IS CALLING AN THE PROZAC MEDICATION THAT WAS CALLED IN FOR HIM TO TRY. PATIENT THINKS IT COULD POSSIBLY BE TO MUCH OF A DOSAGE. PATIENT WOULD LIKE TO DISCUSS THIS MEDICATION OR POSSIBLY CHANGING MEDICATION TO HELP WITH ANXIETY.     Is it okay if the provider responds through MyChart: NO        "

## 2023-11-13 DIAGNOSIS — F41.9 ANXIETY: Primary | ICD-10-CM

## 2023-11-13 NOTE — TELEPHONE ENCOUNTER
Spoke with patient, he sent a lengthy MyChart message with more detail but overall does agree with seeing psychiatry if we can set him up with an appointment.

## 2023-11-13 NOTE — TELEPHONE ENCOUNTER
Teddy Paredes MD  [facility]  Clinical Pool2 days ago     WB  Stop Prozac.  This is the lowest  dose available, generally well tolerated. I would recommend he sew a psychiatrist, as every suggested medication seems to cause an issue.

## 2023-12-13 NOTE — TELEPHONE ENCOUNTER
"  Caller: Pipe Watkins \"Rogelio\"    Relationship: Self    Best call back number: [phone]    Requested Prescriptions:   Requested Prescriptions     Pending Prescriptions Disp Refills    apixaban (Eliquis) 5 MG tablet tablet 60 tablet 2     Sig: Take 1 tablet by mouth 2 (Two) Times a Day.        Pharmacy where request should be sent: [facility] # [id number]     Last office visit with prescribing clinician: 7/26/2023   Last telemedicine visit with prescribing clinician: Visit date not found   Next office visit with prescribing clinician: 4/8/2024     Additional details provided by patient: 1 DAY SUPPLY    Does the patient have less than a 3 day supply:  [x] Yes  [] No    Would you like a call back once the refill request has been completed: [x] Yes [] No    If the office needs to give you a call back, can they leave a voicemail: [x] Yes [] No    Henok Oliva Rep   12/13/23 10:40 EST         "

## 2023-12-14 ENCOUNTER — HOSPITAL ENCOUNTER (OUTPATIENT)
Dept: GENERAL RADIOLOGY | Facility: HOSPITAL | Age: 83
Discharge: HOME | End: 2023-12-14
Admitting: INTERNAL MEDICINE
Payer: MEDICARE

## 2023-12-14 ENCOUNTER — OFFICE VISIT (OUTPATIENT)
Dept: FAMILY MEDICINE CLINIC | Facility: CLINIC | Age: 83
End: 2023-12-14
Payer: MEDICARE

## 2023-12-14 VITALS
BODY MASS INDEX: 29.55 KG/M2 | HEIGHT: 70 IN | OXYGEN SATURATION: 98 % | SYSTOLIC BLOOD PRESSURE: 132 MMHG | WEIGHT: 206.4 LBS | DIASTOLIC BLOOD PRESSURE: 72 MMHG | HEART RATE: 72 BPM

## 2023-12-14 DIAGNOSIS — L40.50 PSORIATIC ARTHRITIS: ICD-10-CM

## 2023-12-14 DIAGNOSIS — Z23 NEEDS FLU SHOT: Primary | ICD-10-CM

## 2023-12-14 PROCEDURE — 73130 X-RAY EXAM OF HAND: CPT

## 2023-12-14 RX ORDER — CELECOXIB 200 MG/1
200 CAPSULE ORAL DAILY
Qty: 90 CAPSULE | Refills: 1 | Status: SHIPPED | OUTPATIENT
Start: 2023-12-14

## 2023-12-14 RX ORDER — AMLODIPINE BESYLATE 10 MG/1
10 TABLET ORAL DAILY
COMMUNITY

## 2023-12-14 NOTE — PROGRESS NOTES
"Pipe Watkins  1940  [id number]  Patient Care Team:  Teddy Paredes MD as PCP - General (Internal Medicine)  Parsih Craft III, MD as Cardiologist (Cardiology)    Pipe Watkins is a 83 y.o. male here today for follow up.     This patient is accompanied by their self who contributes to the history of their care.    Chief Complaint:    Chief Complaint   Patient presents with    Arthritis     In hands        History of Present Illness:  I have reviewed and/or updated the patient's past medical, past surgical, family, social history, problem list and allergies as appropriate.     Rogelio is here with a new issue. Has   history of psoriasis. He is concerned about psoriatic arthritis. His rigth mcp joint has beome painful, stiff and swollen and red 2 weeks ago  He has   took cymbalta for depression which he stopped. ( It did help his arthritis). He has started himself on tumeric. Ibuprofen did help the swelling. 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 No f/c psoriasis only being treated with Aquaphor topical at present.  Denied any immune modulators or immunosuppressants.    He does have an appointment with psychiatry in January.for his anxiety    Review of Systems   Constitutional:  Positive for fatigue.   Cardiovascular: Negative.    Gastrointestinal: Negative.    Musculoskeletal:  Positive for arthralgias and joint swelling.   Skin:  Positive for rash.   Psychiatric/Behavioral:  The patient is nervous/anxious.        Vitals:    12/14/23 1123   BP: 132/72   Pulse: 72   SpO2: 98%   Weight: 93.6 kg (206 lb 6.4 oz)   Height: 177 cm (69.69\")     Body mass index is 29.88 kg/m².    Physical Exam  Vitals and nursing note reviewed.   Constitutional:       General: He is not in acute distress.     Appearance: He is well-developed. He is not diaphoretic.   HENT:      Head: Normocephalic and atraumatic.      Right Ear: External ear normal.      Left Ear: External ear normal.      " Mouth/Throat:      Pharynx: No oropharyngeal exudate.   Eyes:      General: No scleral icterus.        Right eye: No discharge.      Conjunctiva/sclera: Conjunctivae normal.   Neck:      Thyroid: No thyromegaly.      Vascular: No JVD.      Trachea: No tracheal deviation.   Cardiovascular:      Rate and Rhythm: Normal rate and regular rhythm.      Heart sounds: Normal heart sounds.      Comments: PMI nondisplaced  Pulmonary:      Effort: Pulmonary effort is normal.      Breath sounds: Normal breath sounds. No wheezing or rales.   Abdominal:      General: Bowel sounds are normal.      Palpations: Abdomen is soft.      Tenderness: There is no abdominal tenderness. There is no guarding or rebound.   Musculoskeletal:      Cervical back: Normal range of motion and neck supple.      Comments: Right MCP is visibly swollen erythematous slightly tender.  Bilateral CMC squaring tenderness as well.   Lymphadenopathy:      Cervical: No cervical adenopathy.   Skin:     General: Skin is warm and dry.      Capillary Refill: Capillary refill takes less than 2 seconds.      Coloration: Skin is not pale.      Findings: No rash.   Neurological:      Mental Status: He is alert and oriented to person, place, and time.      Motor: No abnormal muscle tone.      Coordination: Coordination normal.   Psychiatric:         Judgment: Judgment normal.         Procedures    Results Review:    None    Assessment/Plan:    Problem List Items Addressed This Visit    None  Visit Diagnoses       Needs flu shot    -  Primary    Relevant Orders    Fluzone High-Dose 65+yrs (2038-1610) (Completed)    Psoriatic arthritis        Relevant Medications    celecoxib (CeleBREX) 200 MG capsule    Other Relevant Orders    Ambulatory Referral to Rheumatology    XR Hand 3+ View Right        He remains on Eliquis.  Instructed to stop ibuprofen.  Will cautiously use Celebrex which is more GI protective.  Patient does understand an NSAID.  I strongly believe given his  livelihood working with his hands that he should be seen by rheumatology and referrals been placed.    Plan of care reviewed with patient at the conclusion of today's visit. Education was provided regarding diagnosis and management.  Patient verbalizes understanding of and agreement with management plan.    No follow-ups on file.    Teddy Paredes MD      Please note than portions of this note were completed [facility] a Voice Recognition Program

## 2023-12-15 NOTE — PROGRESS NOTES
He is x-ray did show early erosive changes consistent with psoriatic arthritis.  We have referred him to rheumatology.  Thank you

## 2023-12-21 ENCOUNTER — TELEPHONE (OUTPATIENT)
Dept: CARDIOLOGY | Facility: CLINIC | Age: 83
End: 2023-12-21
Payer: MEDICARE

## 2023-12-21 NOTE — TELEPHONE ENCOUNTER
Patient notified of message from . Stated that he has seen a Rheumatologist.  actually wanted to put him on Methotrexate but he does not want to because of all the side effects.He is going to continue taking  Celebrex daily for now and is requesting 's opinion.Please advise.

## 2023-12-21 NOTE — TELEPHONE ENCOUNTER
Higher bleeding risk with this and eliquis usage.  Could we refer him to a rheumatologist to look into alternatives to celebrex?    [facility] may be a good option

## 2023-12-21 NOTE — TELEPHONE ENCOUNTER
Recently Dx with Psoriatic Arthritis. PCP prescribed Celebrex 200 mg daily. Has been on it for a week now and feeling fine. Wants to know if its ok to take with his Cardiac Hx? Please advise.

## 2023-12-28 NOTE — TELEPHONE ENCOUNTER
Spoke with patient regarding  recommendations. Patient verbalizes understanding and risk taking both Celebrex and Eliquis. Patient states he hopes he can come off of the Celebrex within the week.

## 2024-01-30 DIAGNOSIS — L40.50 PSORIATIC ARTHRITIS: ICD-10-CM

## 2024-01-30 RX ORDER — CELECOXIB 100 MG/1
100 CAPSULE ORAL DAILY
Qty: 90 CAPSULE | Refills: 2 | Status: SHIPPED | OUTPATIENT
Start: 2024-01-30 | End: 2024-01-31 | Stop reason: SDUPTHER

## 2024-01-31 ENCOUNTER — TELEPHONE (OUTPATIENT)
Dept: CARDIOLOGY | Facility: CLINIC | Age: 84
End: 2024-01-31
Payer: MEDICARE

## 2024-01-31 DIAGNOSIS — L40.50 PSORIATIC ARTHRITIS: ICD-10-CM

## 2024-01-31 RX ORDER — CELECOXIB 100 MG/1
100 CAPSULE ORAL DAILY
Qty: 90 CAPSULE | Refills: 2 | Status: SHIPPED | OUTPATIENT
Start: 2024-01-31

## 2024-03-29 DIAGNOSIS — L40.50 PSORIATIC ARTHRITIS: Primary | ICD-10-CM

## 2024-04-01 ENCOUNTER — OFFICE VISIT (OUTPATIENT)
Dept: FAMILY MEDICINE CLINIC | Facility: CLINIC | Age: 84
End: 2024-04-01
Payer: MEDICARE

## 2024-04-01 VITALS
WEIGHT: 201.6 LBS | DIASTOLIC BLOOD PRESSURE: 92 MMHG | SYSTOLIC BLOOD PRESSURE: 142 MMHG | HEART RATE: 70 BPM | HEIGHT: 70 IN | BODY MASS INDEX: 28.86 KG/M2 | OXYGEN SATURATION: 99 %

## 2024-04-01 DIAGNOSIS — H90.0 CONDUCTIVE HEARING LOSS, BILATERAL: ICD-10-CM

## 2024-04-01 DIAGNOSIS — I10 ESSENTIAL HYPERTENSION: Primary | ICD-10-CM

## 2024-04-01 DIAGNOSIS — H93.8X3 SENSATION OF FULLNESS IN BOTH EARS: ICD-10-CM

## 2024-04-01 DIAGNOSIS — L40.50 PSORIATIC ARTHRITIS: ICD-10-CM

## 2024-04-01 RX ORDER — VALSARTAN 40 MG/1
40 TABLET ORAL DAILY
COMMUNITY

## 2024-04-01 NOTE — ASSESSMENT & PLAN NOTE
Exacerbated by anxiety.  Typically running well at home.  No orthostatic symptoms.  He will continue to monitor at home.  Notify office if greater than 135 consistently in the 90s consistently

## 2024-04-01 NOTE — PROGRESS NOTES
"Pipe Watkins  1940  [id number]  Patient Care Team:  Teddy Paredes MD as PCP - General (Internal Medicine)  Parish Craft III, MD as Cardiologist (Cardiology)    Pipe Watkins is a 83 y.o. male here today for follow up.     This patient is accompanied by their self who contributes to the history of their care.    Chief Complaint:    Chief Complaint   Patient presents with    Ear Fullness        History of Present Illness:  I have reviewed and/or updated the patient's past medical, past surgical, family, social history, problem list and allergies as appropriate.     Earache have been occluded making his hearing aid more difficult. He has had fullness, and decreased hearing acuity.    He is off Celebrex for past several days. Has been taking ibuprofen 600 mg daily. He is up to 7.5 mg MTX. His stiffness improves throughout the day ( finger). His psoriasis has improved as well.   He has an upcoming appointment with Dr. Real for second opinion regarding Psoriatic Arthritis . He has appointment with [facility] this week for f/u    Upcoming psychiatry appointment.    He has been monitoring his bp at home- it has been running ok. ( 115/66, typically less than 130 systolically).  He reports no orthostatic symptoms. He has been upset and frustrated with his hearing and thishass made him anxious.      Review of Systems   Constitutional: Negative.    HENT: Negative.     Eyes: Negative.    Respiratory: Negative.     Cardiovascular: Negative.    Gastrointestinal: Negative.    Musculoskeletal:  Positive for arthralgias.   Neurological:  Negative for dizziness and light-headedness.   Psychiatric/Behavioral:  The patient is nervous/anxious.        Vitals:    04/01/24 0914 04/01/24 0954   BP: 178/100 142/92   Pulse: 70    SpO2: 99%    Weight: 91.4 kg (201 lb 9.6 oz)    Height: 177 cm (69.69\")      Body mass index is 29.19 kg/m².    Physical Exam  Vitals and nursing note reviewed.   Constitutional:       General: He is not in " acute distress.     Appearance: He is well-developed. He is not diaphoretic.   HENT:      Head: Normocephalic and atraumatic.      Right Ear: Tympanic membrane, ear canal and external ear normal.      Left Ear: Tympanic membrane, ear canal and external ear normal.      Ears:      Comments: Creume successfully removed       Nose: No rhinorrhea.      Mouth/Throat:      Pharynx: No oropharyngeal exudate.   Eyes:      General: No scleral icterus.        Right eye: No discharge.      Conjunctiva/sclera: Conjunctivae normal.   Neck:      Thyroid: No thyromegaly.      Vascular: No JVD.      Trachea: No tracheal deviation.   Cardiovascular:      Rate and Rhythm: Normal rate and regular rhythm.      Heart sounds: Normal heart sounds.      Comments: PMI nondisplaced  Pulmonary:      Effort: Pulmonary effort is normal.      Breath sounds: Normal breath sounds. No wheezing or rales.   Abdominal:      General: Bowel sounds are normal.      Palpations: Abdomen is soft.      Tenderness: There is no abdominal tenderness. There is no guarding or rebound.   Musculoskeletal:      Cervical back: Normal range of motion and neck supple.   Lymphadenopathy:      Cervical: No cervical adenopathy.   Skin:     General: Skin is warm and dry.      Capillary Refill: Capillary refill takes less than 2 seconds.      Coloration: Skin is not pale.      Findings: No rash.   Neurological:      Mental Status: He is alert and oriented to person, place, and time.      Motor: No abnormal muscle tone.      Coordination: Coordination normal.   Psychiatric:         Mood and Affect: Mood normal.         Behavior: Behavior normal.         Judgment: Judgment normal.         Ear Cerumen Removal    Date/Time: 4/1/2024 9:54 AM    Performed by: Teddy Paredes MD  Authorized by: Teddy Paredes MD  Location details: left ear and right ear  Patient tolerance: patient tolerated the procedure well with no immediate complications  Procedure type: irrigation    Sedation:  Patient sedated: no            Results Review:    None    Assessment/Plan:    Problem List Items Addressed This Visit       Essential hypertension - Primary    Current Assessment & Plan     Exacerbated by anxiety.  Typically running well at home.  No orthostatic symptoms.  He will continue to monitor at home.  Notify office if greater than 135 consistently in the 90s consistently         Relevant Medications    nebivolol (Bystolic) 2.5 MG tablet    valsartan (DIOVAN) 40 MG tablet    Sensation of fullness in both ears    Current Assessment & Plan     Resolved with debridement.         Relevant Orders    Ear Cerumen Removal    Psoriatic arthritis    Current Assessment & Plan     Improving with methotrexate.  He will keep follow-up with [facility] however is seeking second opinion from Dr. Lawson once her office opens.  Anticipate blood work this week         Conductive hearing loss, bilateral    Current Assessment & Plan     Improved with cerumen debridement.  Continue prosthetics.         Relevant Orders    Ear Cerumen Removal       Plan of care reviewed with patient at the conclusion of today's visit. Education was provided regarding diagnosis and management.  Patient verbalizes understanding of and agreement with management plan.    Return in about 6 months (around 10/1/2024) for Medicare Wellness.    Teddy Paredes MD      Please note than portions of this note were completed wt a Voice Recognition Program

## 2024-04-02 ENCOUNTER — TELEPHONE (OUTPATIENT)
Dept: FAMILY MEDICINE CLINIC | Facility: CLINIC | Age: 84
End: 2024-04-02
Payer: MEDICARE

## 2024-04-02 ENCOUNTER — OFFICE VISIT (OUTPATIENT)
Age: 84
End: 2024-04-02
Payer: MEDICARE

## 2024-04-02 VITALS
HEART RATE: 62 BPM | WEIGHT: 200 LBS | DIASTOLIC BLOOD PRESSURE: 82 MMHG | BODY MASS INDEX: 28.63 KG/M2 | SYSTOLIC BLOOD PRESSURE: 162 MMHG | HEIGHT: 70 IN | OXYGEN SATURATION: 96 %

## 2024-04-02 DIAGNOSIS — F41.1 GENERALIZED ANXIETY DISORDER: Primary | ICD-10-CM

## 2024-04-02 PROBLEM — F32.A CHRONIC DEPRESSION: Status: RESOLVED | Noted: 2017-04-04 | Resolved: 2024-04-02

## 2024-04-02 RX ORDER — LORAZEPAM 0.5 MG/1
0.25 TABLET ORAL AS NEEDED
Qty: 5 TABLET | Refills: 0 | Status: SHIPPED | OUTPATIENT
Start: 2024-04-02 | End: 2024-04-03 | Stop reason: SDUPTHER

## 2024-04-02 NOTE — TELEPHONE ENCOUNTER
"Caller: Pipe Watkins \"Rogelio\"    Relationship: Self    Best call back number:       [phone] (Mobile)     What is the best time to reach you:     ANY TIME    Who are you requesting to speak with (clinical staff, provider,  specific staff member):     DR HOLMAN OR REFERRAL COORDINATOR    What was the call regarding:     PATIENT STATED HE WAS SUPPOSED TO BE REFERRED TO RHEUMATOLOGIST, DR DIGNA ANN    PATIENT ALSO STATED HE RECEIVED CONTACT FROM THE [facility] IN [place] FOR SCHEDULING    PATIENT STATED THIS IS INCORRECT    PATIENT REQUESTED A CALL BACK WITH ANY UPDATES REGARDING REFERRAL WITH DR DIGNA ANN       "
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
## 2024-04-03 ENCOUNTER — OFFICE VISIT (OUTPATIENT)
Dept: FAMILY MEDICINE CLINIC | Facility: CLINIC | Age: 84
End: 2024-04-03
Payer: MEDICARE

## 2024-04-03 VITALS
OXYGEN SATURATION: 99 % | HEART RATE: 70 BPM | WEIGHT: 201 LBS | BODY MASS INDEX: 28.77 KG/M2 | HEIGHT: 70 IN | SYSTOLIC BLOOD PRESSURE: 142 MMHG | DIASTOLIC BLOOD PRESSURE: 92 MMHG

## 2024-04-03 DIAGNOSIS — H60.331 SWIMMER'S EAR OF RIGHT SIDE, UNSPECIFIED CHRONICITY: Primary | ICD-10-CM

## 2024-04-03 DIAGNOSIS — F41.1 GENERALIZED ANXIETY DISORDER: ICD-10-CM

## 2024-04-03 PROBLEM — H60.91 EXTERNAL OTITIS OF RIGHT EAR: Status: ACTIVE | Noted: 2024-04-03

## 2024-04-03 RX ORDER — LORAZEPAM 0.5 MG/1
0.25 TABLET ORAL AS NEEDED
Qty: 5 TABLET | Refills: 0 | Status: SHIPPED | OUTPATIENT
Start: 2024-04-03

## 2024-04-03 RX ORDER — NEOMYCIN SULFATE, POLYMYXIN B SULFATE, HYDROCORTISONE 3.5; 10000; 1 MG/ML; [USP'U]/ML; MG/ML
3 SOLUTION/ DROPS AURICULAR (OTIC) 4 TIMES DAILY
Qty: 5 ML | Refills: 0 | Status: SHIPPED | OUTPATIENT
Start: 2024-04-03

## 2024-04-03 NOTE — PROGRESS NOTES
"Pipe Watkins  1940  [id number]  Patient Care Team:  Teddy Paredes MD as PCP - General (Internal Medicine)  Parish Craft III, MD as Cardiologist (Cardiology)  Chyna Cedeño MD as Consulting Physician (Psychiatry)    Pipe Watkins is a 83 y.o. male here today for follow up.     This patient is accompanied by their self who contributes to the history of their care.    Chief Complaint:    Chief Complaint   Patient presents with    Ear Fullness     Clogged and numbness        History of Present Illness:  I have reviewed and/or updated the patient's past medical, past surgical, family, social history, problem list and allergies as appropriate.     This gentleman was just seen on Monday for ear fullness.  He was found to have cerumen impaction which was debrided.  Symptoms apparently improved after that visit however he returns today complaining of less clogging and numbness.  There is no pain.  There is been no fevers or chills.    Review of Systems   Constitutional: Negative.    HENT:  Positive for ear discharge and hearing loss.        Vitals:    04/03/24 0910   BP: 142/92   Pulse: 70   SpO2: 99%   Weight: 91.2 kg (201 lb)   Height: 177 cm (69.69\")     Body mass index is 29.1 kg/m².    Physical Exam  Vitals and nursing note reviewed.   Constitutional:       General: He is not in acute distress.     Appearance: He is well-developed. He is not diaphoretic.   HENT:      Head: Normocephalic and atraumatic.      Right Ear: Tympanic membrane and external ear normal.      Left Ear: External ear normal.      Ears:      Comments: After debridement, his right EAC is somewhat edematous.  Slightly tender with manipulation.  No hemorrhagic component.     Mouth/Throat:      Pharynx: No oropharyngeal exudate.   Eyes:      General: No scleral icterus.        Right eye: No discharge.      Conjunctiva/sclera: Conjunctivae normal.   Neck:      Thyroid: No thyromegaly.      Vascular: No JVD.      Trachea: No " tracheal deviation.   Cardiovascular:      Rate and Rhythm: Normal rate and regular rhythm.      Heart sounds: Normal heart sounds.      Comments: PMI nondisplaced  Pulmonary:      Effort: Pulmonary effort is normal.      Breath sounds: Normal breath sounds. No wheezing or rales.   Abdominal:      General: Bowel sounds are normal.      Palpations: Abdomen is soft.      Tenderness: There is no abdominal tenderness. There is no guarding or rebound.   Musculoskeletal:      Cervical back: Normal range of motion and neck supple.   Lymphadenopathy:      Cervical: No cervical adenopathy.   Skin:     General: Skin is warm and dry.      Capillary Refill: Capillary refill takes less than 2 seconds.      Coloration: Skin is not pale.      Findings: No rash.   Neurological:      Mental Status: He is alert and oriented to person, place, and time.      Motor: No abnormal muscle tone.      Coordination: Coordination normal.   Psychiatric:         Judgment: Judgment normal.         Ear Cerumen Removal    Date/Time: 4/3/2024 10:48 AM    Performed by: Teddy Paredes MD  Authorized by: Teddy Paredes MD  Location details: left ear and right ear  Patient tolerance: patient tolerated the procedure well with no immediate complications  Procedure type: instrumentation   Sedation:  Patient sedated: no            Results Review:    I reviewed the patient's new clinical results.    Assessment/Plan:    Problem List Items Addressed This Visit       External otitis of right ear - Primary    Relevant Medications    neomycin-polymyxin-hydrocortisone (CORTISPORIN) 1 % solution otic solution    Other Relevant Orders    Ear Cerumen Removal   I suspect he has acute swimmer's ear.  I placed him on Cortisporin otic.  He did have debridement of further wax today.  If symptoms do not improve we will send him to the ear nose and throat.  His hearing is improved after this visit    Plan of care reviewed with patient at the conclusion of today's  visit. Education was provided regarding diagnosis and management.  Patient verbalizes understanding of and agreement with management plan.    Return for Next scheduled follow up.    Teddy Paredes MD      Please note than portions of this note were completed wth a Voice Recognition Program

## 2024-04-03 NOTE — TELEPHONE ENCOUNTER
SPOKE W/ PATIENT; PATIENT STATES DR. LUCIANO IS NOT AT [facility]; WENT TO NEW [facility]

## 2024-04-08 ENCOUNTER — OFFICE VISIT (OUTPATIENT)
Dept: CARDIOLOGY | Facility: CLINIC | Age: 84
End: 2024-04-08
Payer: MEDICARE

## 2024-04-08 VITALS
HEART RATE: 63 BPM | BODY MASS INDEX: 28.63 KG/M2 | OXYGEN SATURATION: 98 % | SYSTOLIC BLOOD PRESSURE: 152 MMHG | HEIGHT: 70 IN | DIASTOLIC BLOOD PRESSURE: 90 MMHG | WEIGHT: 200 LBS

## 2024-04-08 DIAGNOSIS — I47.19 AVNRT (AV NODAL RE-ENTRY TACHYCARDIA): Primary | ICD-10-CM

## 2024-04-08 DIAGNOSIS — I10 ESSENTIAL HYPERTENSION: ICD-10-CM

## 2024-04-08 DIAGNOSIS — I48.0 PAROXYSMAL ATRIAL FIBRILLATION: ICD-10-CM

## 2024-04-08 DIAGNOSIS — E78.2 MIXED HYPERLIPIDEMIA: ICD-10-CM

## 2024-04-08 DIAGNOSIS — I49.5 SICK SINUS SYNDROME: ICD-10-CM

## 2024-04-08 PROCEDURE — 93288 INTERROG EVL PM/LDLS PM IP: CPT | Performed by: INTERNAL MEDICINE

## 2024-04-08 PROCEDURE — 3077F SYST BP >= 140 MM HG: CPT | Performed by: INTERNAL MEDICINE

## 2024-04-08 PROCEDURE — 3080F DIAST BP >= 90 MM HG: CPT | Performed by: INTERNAL MEDICINE

## 2024-04-08 PROCEDURE — 99214 OFFICE O/P EST MOD 30 MIN: CPT | Performed by: INTERNAL MEDICINE

## 2024-04-08 RX ORDER — FOLIC ACID 1 MG/1
TABLET ORAL EVERY 24 HOURS
COMMUNITY
Start: 2024-04-03

## 2024-04-08 RX ORDER — DORZOLAMIDE HYDROCHLORIDE AND TIMOLOL MALEATE 20; 5 MG/ML; MG/ML
SOLUTION/ DROPS OPHTHALMIC
COMMUNITY
Start: 2024-04-04

## 2024-04-08 RX ORDER — METHOTREXATE 2.5 MG/1
7.5 TABLET ORAL WEEKLY
COMMUNITY
Start: 2024-04-03
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
## 2024-04-09 RX ORDER — NEBIVOLOL 2.5 MG/1
2.5 TABLET ORAL DAILY
Qty: 90 TABLET | Refills: 1 | Status: SHIPPED | OUTPATIENT
Start: 2024-04-09

## 2024-04-30 ENCOUNTER — OFFICE VISIT (OUTPATIENT)
Dept: FAMILY MEDICINE CLINIC | Facility: CLINIC | Age: 84
End: 2024-04-30
Payer: MEDICARE

## 2024-04-30 ENCOUNTER — HOSPITAL ENCOUNTER (OUTPATIENT)
Dept: RADIOLOGY | Facility: CLINIC | Age: 84
Discharge: HOME | End: 2024-04-30
Payer: MEDICARE

## 2024-04-30 VITALS
WEIGHT: 201.1 LBS | BODY MASS INDEX: 28.79 KG/M2 | OXYGEN SATURATION: 98 % | SYSTOLIC BLOOD PRESSURE: 148 MMHG | DIASTOLIC BLOOD PRESSURE: 88 MMHG | HEART RATE: 119 BPM | HEIGHT: 70 IN

## 2024-04-30 DIAGNOSIS — M79.671 RIGHT FOOT PAIN: ICD-10-CM

## 2024-04-30 DIAGNOSIS — H92.01 EARACHE ON RIGHT: Primary | ICD-10-CM

## 2024-04-30 DIAGNOSIS — H60.331 SWIMMER'S EAR OF RIGHT SIDE, UNSPECIFIED CHRONICITY: ICD-10-CM

## 2024-04-30 DIAGNOSIS — H60.311 CHRONIC DIFFUSE OTITIS EXTERNA OF RIGHT EAR: ICD-10-CM

## 2024-04-30 PROCEDURE — 3077F SYST BP >= 140 MM HG: CPT | Performed by: INTERNAL MEDICINE

## 2024-04-30 PROCEDURE — 99214 OFFICE O/P EST MOD 30 MIN: CPT | Performed by: INTERNAL MEDICINE

## 2024-04-30 PROCEDURE — 3079F DIAST BP 80-89 MM HG: CPT | Performed by: INTERNAL MEDICINE

## 2024-04-30 RX ORDER — NEOMYCIN SULFATE, POLYMYXIN B SULFATE, HYDROCORTISONE 3.5; 10000; 1 MG/ML; [USP'U]/ML; MG/ML
3 SOLUTION/ DROPS AURICULAR (OTIC) 4 TIMES DAILY
Qty: 5 ML | Refills: 1 | Status: SHIPPED | OUTPATIENT
Start: 2024-04-30

## 2024-04-30 NOTE — PROGRESS NOTES
"Pipe Watkins  1940  [id number]  Patient Care Team:  Teddy Paredes MD as PCP - General (Internal Medicine)  Parish Craft III, MD as Cardiologist (Cardiology)  Chyna Cedeño MD as Consulting Physician (Psychiatry)    Pipe Watkins is a 83 y.o. male here today for follow up.     This patient is accompanied by their self who contributes to the history of their care.    Chief Complaint:    Chief Complaint   Patient presents with    Earache     Referral to ENT    Foot Pain     Lt foot         History of Present Illness:  I have reviewed and/or updated the patient's past medical, past surgical, family, social history, problem list and allergies as appropriate.     Ongoing right EAC pain, dull, worse with hearing aid in place no drainage.  Has tried Debrox.  Decreased hearing is noted.  Has been seen at least 2 other visits recently for similar issues.    2 weeks ago, tripped, falling planting his right foot awkwardly. Has not been swelling. Has pain with weight bearing, standing on toes.  Pain is midfoot.  No ankle pain or instability.  Is unsure how he landed however feels he may have hyper extended.  Sharp can be up to 7/10 . At rest no pain. There was no bruising      Review of Systems   HENT:  Positive for ear pain and hearing loss.    Musculoskeletal:  Positive for arthralgias.       Vitals:    04/30/24 1038   BP: 148/88   Pulse: 119   SpO2: 98%   Weight: 91.2 kg (201 lb 1.6 oz)   Height: 177.8 cm (70\")     Body mass index is 28.85 kg/m².    Physical Exam  Vitals and nursing note reviewed.   Constitutional:       General: He is not in acute distress.     Appearance: He is well-developed. He is not diaphoretic.   HENT:      Head: Normocephalic and atraumatic.      Right Ear: External ear normal.      Left Ear: Tympanic membrane, ear canal and external ear normal.      Ears:      Comments: Very edematous and pale EAC on the right.  Visualized tympanic membrane     Mouth/Throat:      Pharynx: No " oropharyngeal exudate.   Eyes:      General: No scleral icterus.        Right eye: No discharge.      Conjunctiva/sclera: Conjunctivae normal.   Neck:      Thyroid: No thyromegaly.      Vascular: No JVD.      Trachea: No tracheal deviation.   Cardiovascular:      Rate and Rhythm: Normal rate and regular rhythm.      Heart sounds: Normal heart sounds.      Comments: PMI nondisplaced  Pulmonary:      Effort: Pulmonary effort is normal.      Breath sounds: Normal breath sounds. No wheezing or rales.   Abdominal:      General: Bowel sounds are normal.      Palpations: Abdomen is soft.      Tenderness: There is no abdominal tenderness. There is no guarding or rebound.   Musculoskeletal:      Cervical back: Normal range of motion and neck supple.      Comments: His ankle range of motion is full.  No tenderness.  There is no tenderness over the Lisfranc region.  However he is point tender over the mid third metatarsal shaft.  Inversion and eversion does not seem painful.  He is able to ambulate without pain however standing on toes causes exquisite pain.   Lymphadenopathy:      Cervical: No cervical adenopathy.   Skin:     General: Skin is warm and dry.      Capillary Refill: Capillary refill takes less than 2 seconds.      Coloration: Skin is not pale.      Findings: No rash.   Neurological:      Mental Status: He is alert and oriented to person, place, and time.      Motor: No abnormal muscle tone.      Coordination: Coordination normal.   Psychiatric:         Judgment: Judgment normal.         Procedures    Results Review:    None    Assessment/Plan:    Problem List Items Addressed This Visit       External otitis of right ear    Relevant Medications    neomycin-polymyxin-hydrocortisone (CORTISPORIN) 1 % solution otic solution    Other Relevant Orders    Ambulatory Referral to ENT (Otolaryngology)     Other Visit Diagnoses       Earache on right    -  Primary    Relevant Medications    neomycin-polymyxin-hydrocortisone  (CORTISPORIN) 1 % solution otic solution    Other Relevant Orders    Ambulatory Referral to ENT (Otolaryngology)    Right foot pain        Relevant Orders    XR Foot 3+ View Right        Breast elevate and ice right foot.  Low threshold for Ortho referral.  X-rays of been requested.      Plan of care reviewed with patient at the conclusion of today's visit. Education was provided regarding diagnosis and management.  Patient verbalizes understanding of and agreement with management plan.    Return for Next scheduled follow up.    Teddy Paredes MD      Please note than portions of this note were completed [facility] a Voice Recognition Program

## 2024-05-01 ENCOUNTER — TELEPHONE (OUTPATIENT)
Dept: CARDIOLOGY | Facility: CLINIC | Age: 84
End: 2024-05-01
Payer: MEDICARE

## 2024-05-01 NOTE — PROGRESS NOTES
Only degenerative arthritis was noted.  No fractures.  Please have him notify the office if pain worsens or does not improve I can refer him to Ortho

## 2024-05-01 NOTE — TELEPHONE ENCOUNTER
Called patient to remind him that his pacemaker reading is due today.     Patient informed me that he was just checked last month during his visit with Dr. Craft. So he will not be sending in a reading today. He will send in his reading in 3 months as scheduled.

## 2024-05-10 RX ORDER — APIXABAN 5 MG/1
5 TABLET, FILM COATED ORAL 2 TIMES DAILY
Qty: 60 TABLET | Refills: 5 | Status: SHIPPED | OUTPATIENT
Start: 2024-05-10

## 2024-05-14 ENCOUNTER — OFFICE VISIT (OUTPATIENT)
Age: 84
End: 2024-05-14
Payer: MEDICARE

## 2024-05-14 VITALS
HEIGHT: 70 IN | WEIGHT: 202.3 LBS | HEART RATE: 63 BPM | OXYGEN SATURATION: 99 % | SYSTOLIC BLOOD PRESSURE: 170 MMHG | BODY MASS INDEX: 28.96 KG/M2 | DIASTOLIC BLOOD PRESSURE: 110 MMHG

## 2024-05-14 DIAGNOSIS — F41.1 GENERALIZED ANXIETY DISORDER: ICD-10-CM

## 2024-05-14 DIAGNOSIS — F43.23 ADJUSTMENT DISORDER WITH MIXED ANXIETY AND DEPRESSED MOOD: Primary | ICD-10-CM

## 2024-05-14 PROCEDURE — 99214 OFFICE O/P EST MOD 30 MIN: CPT | Performed by: STUDENT IN AN ORGANIZED HEALTH CARE EDUCATION/TRAINING PROGRAM

## 2024-05-14 PROCEDURE — 3077F SYST BP >= 140 MM HG: CPT | Performed by: STUDENT IN AN ORGANIZED HEALTH CARE EDUCATION/TRAINING PROGRAM

## 2024-05-14 PROCEDURE — 1159F MED LIST DOCD IN RCRD: CPT | Performed by: STUDENT IN AN ORGANIZED HEALTH CARE EDUCATION/TRAINING PROGRAM

## 2024-05-14 PROCEDURE — 3080F DIAST BP >= 90 MM HG: CPT | Performed by: STUDENT IN AN ORGANIZED HEALTH CARE EDUCATION/TRAINING PROGRAM

## 2024-05-14 PROCEDURE — 1160F RVW MEDS BY RX/DR IN RCRD: CPT | Performed by: STUDENT IN AN ORGANIZED HEALTH CARE EDUCATION/TRAINING PROGRAM

## 2024-05-14 RX ORDER — SERTRALINE HYDROCHLORIDE 25 MG/1
TABLET, FILM COATED ORAL
Qty: 90 TABLET | Refills: 0 | Status: SHIPPED | OUTPATIENT
Start: 2024-05-14

## 2024-05-14 NOTE — PROGRESS NOTES
[facility]             Follow Up Office Visit      Date: 2024   Patient Name: Pipe Watkins  : 1940   MRN: [id number]     Referring Provider: Teddy Paredes MD    Chief Complaint:      ICD-10-CM ICD-9-CM   1. Adjustment disorder with mixed anxiety and depressed mood  F43.23 309.28   2. Generalized anxiety disorder  F41.1 300.02      History of Present Illness:   Pipe Watkins is a 83 y.o. male who is here today for follow up with CATHERINE.    Patient is seen and evaluated in the office. He appears to be in no acute distress at this time. He is calm and cooperative with evaluation, and exhibits a linear and goal directed thought process.  Patient's blood pressure is 170/110 today.  He denies any physical complaints/headaches/dizziness.  Patient states that he checks his blood pressure regularly at home, and this is an outlier to his normal range.  He states that this is due to him being more anxious about being at the doctor's office today.  Outside of this instance, patient states that his anxiety has been better managed over the past month.  He has not had any panic attacks since he last saw the writer.  He has been taking L-theanine, and he has not needed to trial half tablet of Ativan yet.  He was recently diagnosed with psoriatic arthritis.  He speaks with writer regarding pain that he experiences with this, and difficulties that he has been having with new medication methotrexate.  He feels as though this has led to more sadness for him.  He becomes tearful when talking about it.  He feels as though it is to the point where he needs medications to address this.  We talked about medication options and writer reviewed patient's Gene site testing with him.  He is currently taking L-methylfolate supplement as well.  We will start Zoloft.  Gene site says that he metabolizes this medication quickly and may require higher doses.  This was explained to the patient.   However, patient is typically pretty hesitant to start medications so we will start at 25 mg a day.  After 1 to 2 weeks, he can increase to 50 mg.  We will follow-up in 1 month to see how he is tolerating this.  Patient asked what is the solution for his negative thinking.  He stopped therapy when he started with a psychiatrist because he did not feel as though he needed both.  Writer recommended he restart therapy as this would be very beneficial for him.    Previous Medication Trials:  Lexapro, Cymbalta (palpitations), Prozac, Pristiq, Effexor     Subjective      Review of Systems:   Review of Systems   Constitutional:  Negative for chills, fatigue and fever.   HENT:  Negative for congestion, hearing loss, sore throat and trouble swallowing.    Eyes:  Negative for blurred vision and double vision.   Respiratory:  Negative for cough, chest tightness and shortness of breath.    Cardiovascular:  Negative for chest pain and palpitations.   Gastrointestinal:  Negative for abdominal pain, constipation, diarrhea, nausea and vomiting.   Endocrine: Negative for polydipsia and polyuria.   Genitourinary:  Negative for hematuria and urgency.   Musculoskeletal:  Negative for arthralgias.   Skin:  Negative for skin lesions and bruise.   Neurological:  Negative for dizziness, tremors, seizures and light-headedness.   Hematological:  Negative for adenopathy.     Screening Scores:   PHQ-9 : 3  CATHERINE-7 : 3    Medications:     Current Outpatient Medications:     apixaban (Eliquis) 5 MG tablet tablet, TAKE 1 TABLET BY MOUTH TWICE DAILY, Disp: 60 tablet, Rfl: 5    Cholecalciferol (VITAMIN D3) 125 MCG (5000 UT) capsule capsule, Take 4,000 Units by mouth Daily., Disp: , Rfl:     dorzolamide-timolol (COSOPT) 2-0.5 % ophthalmic solution, INSTILL 1 DROP IN BOTH EYES EVERY 12 HOURS, Disp: , Rfl:     folic acid (FOLVITE) 1 MG tablet, Take  by mouth Daily., Disp: , Rfl:     LORazepam (Ativan) 0.5 MG tablet, Take 0.5 tablets by mouth As Needed  "for Anxiety., Disp: 5 tablet, Rfl: 0    magnesium oxide (MAG-OX) 400 MG tablet, Take 1 tablet by mouth Daily., Disp: 90 tablet, Rfl: 3    methotrexate 2.5 MG tablet, Take 3 tablets by mouth 1 (One) Time Per Week., Disp: , Rfl:     Methotrexate 7.5 MG/0.3ML solution prefilled syringe, Inject  under the skin into the appropriate area as directed., Disp: , Rfl:     nebivolol (BYSTOLIC) 2.5 MG tablet, TAKE 1 TABLET BY MOUTH DAILY, Disp: 90 tablet, Rfl: 1    neomycin-polymyxin-hydrocortisone (CORTISPORIN) 1 % solution otic solution, Administer 3 drops to the right ear 4 (Four) Times a Day., Disp: 5 mL, Rfl: 1    Omega-3 Fatty Acids (fish oil) 1000 MG capsule capsule, Take  by mouth Every Night., Disp: , Rfl:     valsartan (DIOVAN) 40 MG tablet, Take 1 tablet by mouth Daily., Disp: , Rfl:     Vitamin A 2400 MCG (8000 UT) tablet, Take  by mouth., Disp: , Rfl:     vitamin B-12 (CYANOCOBALAMIN) 1000 MCG tablet, Take 1 tablet by mouth Daily., Disp: , Rfl:     vitamin C (ASCORBIC ACID) 250 MG tablet, Take 4 tablets by mouth Daily., Disp: , Rfl:     vitamin E 100 UNIT capsule, Take 4 capsules by mouth Daily., Disp: , Rfl:     Zinc 50 MG capsule, Take 50 mg by mouth Daily., Disp: , Rfl:     Medication Considerations:  ARMANDO reviewed and appropriate.     Allergies:   Allergies   Allergen Reactions    Other Cough    Ace Inhibitors      Cough     Lipitor [Atorvastatin]      Severe depression    Paroxetine Other (See Comments)    Penicillins Hives    Beta Adrenergic Blockers Unknown (See Comments)    Diltiazem Unknown (See Comments)     Objective     Vital Signs:   Vitals:    05/14/24 0920   BP: (!) 170/110   Pulse: 63   SpO2: 99%   Weight: 91.8 kg (202 lb 4.8 oz)   Height: 177.8 cm (70\")     Body mass index is 29.03 kg/m².     Mental Status Exam:   MENTAL STATUS EXAM   General Appearance:  Cleanly groomed and dressed  Eye Contact:  Good eye contact  Attitude:  Cooperative  Motor Activity:  Normal gait, posture  Muscle Strength:  " Normal  Speech:  Normal rate, tone, volume  Language:  Spontaneous  Mood and affect:  Normal, pleasant and appropriate  Hopelessness:  Denies  Thought Process:  Logical and goal-directed  Associations/ Thought Content:  No delusions  Hallucinations:  None  Suicidal Ideations:  Not present  Homicidal Ideation:  Not present  Sensorium:  Alert  Orientation:  Person, place, time and situation  Immediate Recall, Recent, and Remote Memory:  Intact  Attention Span/ Concentration:  Good  Fund of Knowledge:  Appropriate for age and educational level  Intellectual Functioning:  Average range  Insight:  Fair  Judgement:  Fair  Reliability:  Fair  Impulse Control:  Fair      SUICIDE RISK ASSESSMENT/CSSRS:  1. Does patient have thoughts of suicide? denies  2. Does patient have intent for suicide? denies  3. Does patient have a current plan for suicide? denies  4. History of suicide attempts: denies  5. Family history of suicide or attempts: denies  6. History of violent behaviors towards others or property or thoughts of committing suicide: denies  7. History of sexual aggression toward others: denies  8. Access to firearms or weapons: denies    Assessment / Plan      Visit Diagnosis/Orders Placed This Visit:  Diagnoses and all orders for this visit:    Generalized anxiety disorder (Primary)  Adjustment Disorder with depressed mood  - Continue L-Theanine   - Provided 5 tablets 0.5 mg Ativan; patient advised to take 1/2 tablet PRN panic attack if mindfulness/music are ineffective (patient has not used these yet)  - Start Zoloft 25 mg po daily; may increase to 50 mg po daily after 1-2 weeks as tolerated  - Continue therapy   - Follow up with writer in 1 mo  Labs Reviewed : labs from 6/15/23 reviewed  Chart Reviewed      Functional Status: Mild impairment      Prognosis: Fair with Ongoing Treatment     Impression/Formulation:  Patient appeared alert and oriented. Patient is receptive to assistance with maintaining a stable  lifestyle.  Patient presents with history of     ICD-10-CM ICD-9-CM   1. Adjustment disorder with mixed anxiety and depressed mood  F43.23 309.28   2. Generalized anxiety disorder  F41.1 300.02     Treatment Plan:     Patient will continue supportive psychotherapy efforts and medications as indicated. Clinic will obtain release of information for current treatment team for continuity of care as needed. Patient will contact this office, call 911 or present to the nearest emergency room should suicidal or homicidal ideations occur.  Discussed medication options and treatment plan of prescribed medication(s) as well as the risks, benefits, and potential side effects. Patient ackowledged and verbally consented to continue with current treatment plan and was educated on the importance of compliance with treatment and follow-up appointments.     Quality Measures:  Tobacco: Pipe Watkins  reports that he has never smoked. He has never been exposed to tobacco smoke. He has never used smokeless tobacco. I have educated him on the risk of diseases from using tobacco products such as cancer, COPD, and heart disease.       Follow Up:   Return in about 1 month (around 6/14/2024) for Medication Management.    Chyna Cedeño MD  [facility]     This is electronically signed by Chyna Cedeño MD   05/14/2024 09:10 EDT

## 2024-05-16 ENCOUNTER — HOSPITAL ENCOUNTER (EMERGENCY)
Facility: HOSPITAL | Age: 84
Discharge: HOME | End: 2024-05-16
Attending: EMERGENCY MEDICINE
Payer: MEDICARE

## 2024-05-16 ENCOUNTER — NURSE TRIAGE (OUTPATIENT)
Dept: CALL CENTER | Facility: HOSPITAL | Age: 84
End: 2024-05-16
Payer: MEDICARE

## 2024-05-16 ENCOUNTER — TELEPHONE (OUTPATIENT)
Dept: FAMILY MEDICINE CLINIC | Facility: CLINIC | Age: 84
End: 2024-05-16
Payer: MEDICARE

## 2024-05-16 VITALS
DIASTOLIC BLOOD PRESSURE: 87 MMHG | SYSTOLIC BLOOD PRESSURE: 151 MMHG | HEIGHT: 70 IN | WEIGHT: 202 LBS | HEART RATE: 60 BPM | TEMPERATURE: 97.8 F | RESPIRATION RATE: 16 BRPM | BODY MASS INDEX: 28.92 KG/M2 | OXYGEN SATURATION: 96 %

## 2024-05-16 DIAGNOSIS — Z86.79 HISTORY OF ATRIAL FIBRILLATION: ICD-10-CM

## 2024-05-16 DIAGNOSIS — I10 ASYMPTOMATIC HYPERTENSION: Primary | ICD-10-CM

## 2024-05-16 LAB
BUN BLDA-MCNC: 25 MG/DL (ref 8–26)
CA-I BLDA-SCNC: 1.27 MMOL/L (ref 1.2–1.32)
CHLORIDE BLDA-SCNC: 99 MMOL/L (ref 98–109)
CO2 BLDA-SCNC: 24 MMOL/L (ref 24–29)
CREAT BLDA-MCNC: 1.3 MG/DL (ref 0.6–1.3)
EGFRCR SERPLBLD CKD-EPI 2021: 54.2 ML/MIN/1.73
GLUCOSE BLDC GLUCOMTR-MCNC: 94 MG/DL (ref 70–130)
HCT VFR BLDA CALC: 44 % (ref 38–51)
HGB BLDA-MCNC: 15 G/DL (ref 12–17)
POTASSIUM BLDA-SCNC: 4.4 MMOL/L (ref 3.5–4.9)
SODIUM BLD-SCNC: 134 MMOL/L (ref 138–146)

## 2024-05-16 PROCEDURE — 85014 HEMATOCRIT: CPT

## 2024-05-16 PROCEDURE — 36415 COLL VENOUS BLD VENIPUNCTURE: CPT

## 2024-05-16 PROCEDURE — 80047 BASIC METABLC PNL IONIZED CA: CPT

## 2024-05-16 PROCEDURE — 99283 EMERGENCY DEPT VISIT LOW MDM: CPT

## 2024-05-16 RX ORDER — CLONIDINE HYDROCHLORIDE 0.1 MG/1
0.1 TABLET ORAL ONCE
Status: COMPLETED | OUTPATIENT
Start: 2024-05-16 | End: 2024-05-16

## 2024-05-16 RX ORDER — HYDROXYZINE HYDROCHLORIDE 25 MG/1
25 TABLET, FILM COATED ORAL ONCE
Status: COMPLETED | OUTPATIENT
Start: 2024-05-16 | End: 2024-05-16

## 2024-05-16 RX ADMIN — HYDROXYZINE HYDROCHLORIDE 25 MG: 25 TABLET, FILM COATED ORAL at 18:00

## 2024-05-16 RX ADMIN — CLONIDINE HYDROCHLORIDE 0.1 MG: 0.1 TABLET ORAL at 18:31

## 2024-05-16 NOTE — ED PROVIDER NOTES
[place]    EMERGENCY DEPARTMENT ENCOUNTER      Pt Name: Pipe Watkins  MRN: [id number]  YOB: 1940  Date of evaluation: 5/16/2024  Provider: Simon Smith MD    CHIEF COMPLAINT       Chief Complaint   Patient presents with    Hypertension         HISTORY OF PRESENT ILLNESS   Pipe Watkins is a 83 y.o. male who presents to the emergency department with complaint of elevated blood pressure.  Patient states that he has had issues with anxiety and stress over the course of the last several months and during these episodes his blood pressure increases.  His systolic pressure got up to 190 today, prompting him to come to the ED.  He says that he had a mild frontal headache earlier but this resolved after Tylenol.  No associated neck pain or stiffness, fever, chills, visual changes, peripheral paresthesias, weakness, numbness, or difficulty ambulating.  He has had no chest pain or discomfort, shortness of breath, or difficulty with ambulating.      Nursing notes were reviewed.    REVIEW OF SYSTEMS     ROS:  A chief complaint appropriate review of systems was completed and is negative except as noted in the HPI.      PAST MEDICAL HISTORY     Past Medical History:   Diagnosis Date    Abnormal ECG     Alcohol abuse     with no consumption for 12 years     Arrhythmia     Arthritis     Depression     Hyperlipidemia     With Statin intolerance      Hypertension     Osteoarthritis     Paroxysmal atrial fibrillation 12/22/2021    Sick sinus syndrome     SVT (supraventricular tachycardia)     Wears hearing aid     Wears prescription eyeglasses          SURGICAL HISTORY       Past Surgical History:   Procedure Laterality Date    ABLATION OF DYSRHYTHMIC FOCUS      CARDIAC ELECTROPHYSIOLOGY PROCEDURE N/A 1/18/2017    Procedure: Ablation AVNRT;  Surgeon: Gennaro Kirk MD;  Location: [facility] INVASIVE LOCATION;  Service:     COLONOSCOPY      INSERT / REPLACE / REMOVE PACEMAKER      KNEE ARTHROSCOPY W/  MENISCAL REPAIR Left     PACEMAKER IMPLANTATION  01/13/2015    [place] ER presentation  with supraventricular tachycardia at a rate of 180, converted with 12 mg of adenosine     TONSILLECTOMY           CURRENT MEDICATIONS     No current facility-administered medications for this encounter.    Current Outpatient Medications:     apixaban (Eliquis) 5 MG tablet tablet, TAKE 1 TABLET BY MOUTH TWICE DAILY, Disp: 60 tablet, Rfl: 5    Cholecalciferol (VITAMIN D3) 125 MCG (5000 UT) capsule capsule, Take 4,000 Units by mouth Daily., Disp: , Rfl:     dorzolamide-timolol (COSOPT) 2-0.5 % ophthalmic solution, INSTILL 1 DROP IN BOTH EYES EVERY 12 HOURS, Disp: , Rfl:     folic acid (FOLVITE) 1 MG tablet, Take  by mouth Daily., Disp: , Rfl:     LORazepam (Ativan) 0.5 MG tablet, Take 0.5 tablets by mouth As Needed for Anxiety., Disp: 5 tablet, Rfl: 0    magnesium oxide (MAG-OX) 400 MG tablet, Take 1 tablet by mouth Daily., Disp: 90 tablet, Rfl: 3    methotrexate 2.5 MG tablet, Take 3 tablets by mouth 1 (One) Time Per Week., Disp: , Rfl:     Methotrexate 7.5 MG/0.3ML solution prefilled syringe, Inject  under the skin into the appropriate area as directed., Disp: , Rfl:     nebivolol (BYSTOLIC) 2.5 MG tablet, TAKE 1 TABLET BY MOUTH DAILY, Disp: 90 tablet, Rfl: 1    neomycin-polymyxin-hydrocortisone (CORTISPORIN) 1 % solution otic solution, Administer 3 drops to the right ear 4 (Four) Times a Day., Disp: 5 mL, Rfl: 1    Omega-3 Fatty Acids (fish oil) 1000 MG capsule capsule, Take  by mouth Every Night., Disp: , Rfl:     sertraline (Zoloft) 25 MG tablet, Start with 1 tablet daily. May increase to two tablets daily after 1-2 weeks, Disp: 90 tablet, Rfl: 0    valsartan (DIOVAN) 40 MG tablet, Take 1 tablet by mouth Daily., Disp: , Rfl:     Vitamin A 2400 MCG (8000 UT) tablet, Take  by mouth., Disp: , Rfl:     vitamin B-12 (CYANOCOBALAMIN) 1000 MCG tablet, Take 1 tablet by mouth Daily., Disp: , Rfl:     vitamin C (ASCORBIC ACID) 250 MG  "tablet, Take 4 tablets by mouth Daily., Disp: , Rfl:     vitamin E 100 UNIT capsule, Take 4 capsules by mouth Daily., Disp: , Rfl:     Zinc 50 MG capsule, Take 50 mg by mouth Daily., Disp: , Rfl:     ALLERGIES     Other, Ace inhibitors, Lipitor [atorvastatin], Paroxetine, Penicillins, Beta adrenergic blockers, and Diltiazem    FAMILY HISTORY       Family History   Problem Relation Age of Onset    Hypertension Mother     Heart attack Father           SOCIAL HISTORY       Social History     Socioeconomic History    Marital status:    Tobacco Use    Smoking status: Never     Passive exposure: Never    Smokeless tobacco: Never   Vaping Use    Vaping status: Never Used   Substance and Sexual Activity    Alcohol use: No     Comment: 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  HEENT: Conjunctivae normal.  Neck: Trachea midline.  Cardiac: Heart regular rate, rhythm, no murmurs, rubs, or gallops  Lungs: Lungs are clear to auscultation, there is no wheezing, rhonchi, or rales. There is no use of accessory muscles.  Chest wall: There is no tenderness to palpation over the chest wall or over ribs  Abdomen: Abdomen is soft, nontender, nondistended. There are no firm or pulsatile masses, no rebound rigidity or guarding.   Musculoskeletal: No deformity.  Neuro: Alert and oriented x 4.  Dermatology: Skin is warm and dry  Psych: Mentation is grossly normal, cognition is grossly normal. Affect is " appropriate.        DIAGNOSTIC RESULTS     EKG: All EKGs are interpreted by the Emergency Department Physician who either signs or Co-signs this chart in the absence of a cardiologist.    No orders to display         RADIOLOGY:   [x] Radiologist's Report Reviewed:  No orders to display       I ordered and independently reviewed the above noted radiographic studies.        LABS:    I have reviewed and interpreted all of the currently available lab results from this visit (if applicable):  Results for orders placed or performed during the hospital encounter of 05/16/24   POC CHEM 8    Specimen: Blood   Result Value Ref Range    Glucose 94 70 - 130 mg/dL    BUN 25 8 - 26 mg/dL    Creatinine 1.30 0.60 - 1.30 mg/dL    Sodium 134 (L) 138 - 146 mmol/L    POC Potassium 4.4 3.5 - 4.9 mmol/L    Chloride 99 98 - 109 mmol/L    Total CO2 24 24 - 29 mmol/L    Hemoglobin 15.0 12.0 - 17.0 g/dL    Hematocrit 44 38 - 51 %    Ionized Calcium 1.27 1.20 - 1.32 mmol/L    eGFR 54.2 (L) >60.0 mL/min/1.73        If labs were ordered, I independently reviewed the results and considered them in treating the patient.      EMERGENCY DEPARTMENT COURSE and DIFFERENTIAL DIAGNOSIS/MDM:   Vitals:  AS OF 19:37 EDT    BP - 151/87  HR - 60  TEMP - 97.8 °F (36.6 °C) (Oral)  O2 SATS - 96%        Discussion below represents my analysis of pertinent findings related to patient's condition, differential diagnosis, treatment plan and final disposition.      Differential diagnosis:  The differential diagnosis associated with the patient's presentation includes: Medication noncompliance, renal dysfunction, essential hypertension      Independent interpretations (ECG/rhythm strip/X-ray/US/CT scan): I independently interpreted the patient's cardiac monitor which showed sinus rhythm.      Patient's care impacted by:   [] Diabetes   [x] Hypertension   [] Coronary Artery Disease   [] Cancer   [] Other:     Care significantly affected by Social Determinants of  Health (housing and economic circumstances, unemployment)    [] Yes     [x] No   If yes, Patient's care significantly limited by  Social Determinants of Health including:    [] Inadequate housing    [] Low income    [] Alcoholism and drug addiction in family    [] Problems related to primary support group    [] Unemployment    [] Problems related to employment    [] Other Social Determinants of Health:       ED Course:    ED Course as of 05/16/24 1937   Thu May 16, 2024   1936 On reexamination, the patient is completely asymptomatic.  Resting comfortably in bed.  Patient asymptomatic throughout the duration of her stay.  Mild frontal headache consistent with usual headache but nothing severe and without any neurologic complaints or deficits and I have low clinical suspicion for intracranial hemorrhage or any other emergent cause.  He has no symptoms consistent with endorgan damage including no neurologic symptoms or symptoms that would raise concern for ACS or CHF.  Renal function is normal based on Chem-8 testing.  Blood pressure improved with treatment of anxiety and administration of clonidine.  Discussed return precautions if he develops any symptoms as well as close follow-up with his primary care provider. [NS]      ED Course User Index  [NS] Simon Smith MD             I had a discussion with the patient/family regarding diagnosis, diagnostic results, treatment plan, and medications.  The patient/family indicated understanding of these instructions.  I spent adequate time at the bedside preceding discharge necessary to personally discuss the aftercare instructions, giving patient education, providing explanations of the results of our evaluations/findings, and my decision making to assure that the patient/family understand the plan of care.  Time was allotted to answer questions at that time and throughout the ED course.  Emphasis was placed on timely follow-up after discharge.  I also discussed the  potential for the development of an acute emergent condition requiring further evaluation, admission, or even surgical intervention. I discussed that we found nothing during the visit today indicating the need for further workup, admission, or the presence of an unstable medical condition.  I encouraged the patient to return to the emergency department immediately for ANY concerns, worsening, new complaints, or if symptoms persist and unable to seek follow-up in a timely fashion.  The patient/family expressed understanding and agreement with this plan.  The patient will follow-up with their PCP in 1-2 days for reevaluation.           PROCEDURES:  Procedures    CRITICAL CARE TIME        FINAL IMPRESSION      1. Asymptomatic hypertension    2. History of atrial fibrillation          DISPOSITION/PLAN     ED Disposition       ED Disposition   Discharge    Condition   Stable    Comment   --                 Comment: Please note this report has been produced using speech recognition software.      Simon Smith MD  Attending Emergency Physician             Simon Smith MD  05/16/24 [id number]

## 2024-05-16 NOTE — TELEPHONE ENCOUNTER
"Per Dr Paredes: \"he can take an extra bystolic ( we have him at 2.5 mg daily ) and recheck in 2 hours. if no better can go to the ER\"  I have called Mr Watkins to let him know this.  "

## 2024-05-17 NOTE — TELEPHONE ENCOUNTER
"Patient called reporting BP of 103/49 and 89/45, both taken in Left arm. Was seen in ER today for hypertension - BP as high as 218/118. Given Clonidine. Patient concerned BP is dropping too low, drank 16oz water and ate olives to increase BP. Rechecked BP while on triage call, Right arm  - 132/68 and Left arm 116/64. Advised to continue to monitor BP and needs to make follow up with PCP. Call [facility] with any other questions or concerns. Verbalizes understanding.           Reason for Disposition   [1] Fall in systolic BP > 20 mm Hg from normal AND [2] NOT dizzy, lightheaded, or weak    Additional Information   Negative: Started suddenly after an allergic medicine, an allergic food, or bee sting   Negative: Shock suspected (e.g., cold/pale/clammy skin, too weak to stand, low BP, rapid pulse)   Negative: Difficult to awaken or acting confused (e.g., disoriented, slurred speech)   Negative: Fainted   Negative: [1] Systolic BP < 90 AND [2] dizzy, lightheaded, or weak   Negative: Chest pain   Negative: Bleeding (e.g., vomiting blood, rectal bleeding or tarry stools, severe vaginal bleeding)(Exception: Fainted from sight of small amount of blood; small cut or abrasion.)   Negative: Extra heartbeats, irregular heart beating, or heart is beating very fast  (i.e., \"palpitations\")   Negative: Sounds like a life-threatening emergency to the triager   Negative: [1] Systolic BP < 80 AND [2] NOT dizzy, lightheaded or weak   Negative: Abdominal pain   Negative: Fever > 100.4 F (38.0 C)   Negative: Major surgery in the past month   Negative: [1] Drinking very little AND [2] dehydration suspected (e.g., no urine > 12 hours, very dry mouth, very lightheaded)   Negative: [1] Fall in systolic BP > 20 mm Hg from normal AND [2] dizzy, lightheaded, or weak   Negative: Patient sounds very sick or weak to the triager   Negative: [1] Systolic BP < 90 AND [2] NOT dizzy, lightheaded or weak   Negative: [1] Systolic BP  AND [2] taking blood " "pressure medications AND [3] dizzy, lightheaded or weak   Negative: [1] Systolic BP  AND [2] taking blood pressure medications AND [3] NOT dizzy, lightheaded or weak    Answer Assessment - Initial Assessment Questions  1. BLOOD PRESSURE: \"What is the blood pressure?\" \"Did you take at least two measurements 5 minutes apart?\"      Both in Left arm - 103/49, 88/45  2. ONSET: \"When did you take your blood pressure?\"      At 21:48 and 22:08  3. HOW: \"How did you obtain the blood pressure?\" (e.g., visiting nurse, automatic home BP monitor)      Automatic home BP machine  4. HISTORY: \"Do you have a history of low blood pressure?\" \"What is your blood pressure normally?\"      History of high blood pressure   5. MEDICINES: \"Are you taking any medications for blood pressure?\" If Yes, ask: \"Have they been changed recently?\"      Yes - started taking Clonidine   6. PULSE RATE: \"Do you know what your pulse rate is?\"       Not given   7. OTHER SYMPTOMS: \"Have you been sick recently?\" \"Have you had a recent injury?\"      no  8. PREGNANCY: \"Is there any chance you are pregnant?\" \"When was your last menstrual period?\"      no    Protocols used: Blood Pressure - Low-ADULT-AH    "

## 2024-05-18 ENCOUNTER — HOSPITAL ENCOUNTER (OUTPATIENT)
Facility: HOSPITAL | Age: 84
Setting detail: OBSERVATION
Discharge: HOME | End: 2024-05-20
Attending: EMERGENCY MEDICINE | Admitting: INTERNAL MEDICINE
Payer: MEDICARE

## 2024-05-18 ENCOUNTER — APPOINTMENT (OUTPATIENT)
Dept: GENERAL RADIOLOGY | Facility: HOSPITAL | Age: 84
End: 2024-05-18
Payer: MEDICARE

## 2024-05-18 DIAGNOSIS — Z78.9 FAILURE OF OUTPATIENT TREATMENT: ICD-10-CM

## 2024-05-18 DIAGNOSIS — R07.9 CHEST PAIN, UNSPECIFIED TYPE: ICD-10-CM

## 2024-05-18 DIAGNOSIS — I16.0 HYPERTENSIVE URGENCY: Primary | ICD-10-CM

## 2024-05-18 LAB
ALBUMIN SERPL-MCNC: 4.2 G/DL (ref 3.5–5.2)
ALBUMIN/GLOB SERPL: 1.6 G/DL
ALP SERPL-CCNC: 39 U/L (ref 39–117)
ALT SERPL W P-5'-P-CCNC: 10 U/L (ref 1–41)
ANION GAP SERPL CALCULATED.3IONS-SCNC: 7 MMOL/L (ref 5–15)
AST SERPL-CCNC: 13 U/L (ref 1–40)
BASOPHILS # BLD AUTO: 0.03 10*3/MM3 (ref 0–0.2)
BASOPHILS NFR BLD AUTO: 0.5 % (ref 0–1.5)
BILIRUB SERPL-MCNC: 0.5 MG/DL (ref 0–1.2)
BUN SERPL-MCNC: 19 MG/DL (ref 8–23)
BUN/CREAT SERPL: 20.7 (ref 7–25)
CALCIUM SPEC-SCNC: 9.4 MG/DL (ref 8.6–10.5)
CHLORIDE SERPL-SCNC: 98 MMOL/L (ref 98–107)
CO2 SERPL-SCNC: 30 MMOL/L (ref 22–29)
CREAT SERPL-MCNC: 0.92 MG/DL (ref 0.76–1.27)
DEPRECATED RDW RBC AUTO: 46.5 FL (ref 37–54)
EGFRCR SERPLBLD CKD-EPI 2021: 82 ML/MIN/1.73
EOSINOPHIL # BLD AUTO: 0.12 10*3/MM3 (ref 0–0.4)
EOSINOPHIL NFR BLD AUTO: 2.2 % (ref 0.3–6.2)
ERYTHROCYTE [DISTWIDTH] IN BLOOD BY AUTOMATED COUNT: 13.6 % (ref 12.3–15.4)
GEN 5 2HR TROPONIN T REFLEX: 12 NG/L
GLOBULIN UR ELPH-MCNC: 2.7 GM/DL
GLUCOSE SERPL-MCNC: 158 MG/DL (ref 65–99)
HCT VFR BLD AUTO: 44.3 % (ref 37.5–51)
HGB BLD-MCNC: 14.6 G/DL (ref 13–17.7)
HOLD SPECIMEN: NORMAL
IMM GRANULOCYTES # BLD AUTO: 0.02 10*3/MM3 (ref 0–0.05)
IMM GRANULOCYTES NFR BLD AUTO: 0.4 % (ref 0–0.5)
LIPASE SERPL-CCNC: 92 U/L (ref 13–60)
LYMPHOCYTES # BLD AUTO: 1.04 10*3/MM3 (ref 0.7–3.1)
LYMPHOCYTES NFR BLD AUTO: 18.7 % (ref 19.6–45.3)
MCH RBC QN AUTO: 30.4 PG (ref 26.6–33)
MCHC RBC AUTO-ENTMCNC: 33 G/DL (ref 31.5–35.7)
MCV RBC AUTO: 92.3 FL (ref 79–97)
MONOCYTES # BLD AUTO: 0.53 10*3/MM3 (ref 0.1–0.9)
MONOCYTES NFR BLD AUTO: 9.5 % (ref 5–12)
NEUTROPHILS NFR BLD AUTO: 3.82 10*3/MM3 (ref 1.7–7)
NEUTROPHILS NFR BLD AUTO: 68.7 % (ref 42.7–76)
NRBC BLD AUTO-RTO: 0 /100 WBC (ref 0–0.2)
NT-PROBNP SERPL-MCNC: 269.9 PG/ML (ref 0–1800)
PLATELET # BLD AUTO: 217 10*3/MM3 (ref 140–450)
PMV BLD AUTO: 8.8 FL (ref 6–12)
POTASSIUM SERPL-SCNC: 4.3 MMOL/L (ref 3.5–5.2)
PROT SERPL-MCNC: 6.9 G/DL (ref 6–8.5)
RBC # BLD AUTO: 4.8 10*6/MM3 (ref 4.14–5.8)
SODIUM SERPL-SCNC: 135 MMOL/L (ref 136–145)
TROPONIN T DELTA: -1 NG/L
TROPONIN T SERPL HS-MCNC: 13 NG/L
WBC NRBC COR # BLD AUTO: 5.56 10*3/MM3 (ref 3.4–10.8)
WHOLE BLOOD HOLD COAG: NORMAL
WHOLE BLOOD HOLD SPECIMEN: NORMAL

## 2024-05-18 PROCEDURE — G0378 HOSPITAL OBSERVATION PER HR: HCPCS

## 2024-05-18 PROCEDURE — 96366 THER/PROPH/DIAG IV INF ADDON: CPT

## 2024-05-18 PROCEDURE — 25010000002 NITROGLYCERIN 200 MCG/ML SOLUTION: Performed by: NURSE PRACTITIONER

## 2024-05-18 PROCEDURE — 93005 ELECTROCARDIOGRAM TRACING: CPT | Performed by: EMERGENCY MEDICINE

## 2024-05-18 PROCEDURE — 96375 TX/PRO/DX INJ NEW DRUG ADDON: CPT

## 2024-05-18 PROCEDURE — 99285 EMERGENCY DEPT VISIT HI MDM: CPT

## 2024-05-18 PROCEDURE — 93005 ELECTROCARDIOGRAM TRACING: CPT

## 2024-05-18 PROCEDURE — 96365 THER/PROPH/DIAG IV INF INIT: CPT

## 2024-05-18 PROCEDURE — 80053 COMPREHEN METABOLIC PANEL: CPT | Performed by: EMERGENCY MEDICINE

## 2024-05-18 PROCEDURE — 84484 ASSAY OF TROPONIN QUANT: CPT | Performed by: NURSE PRACTITIONER

## 2024-05-18 PROCEDURE — 25010000002 LORAZEPAM PER 2 MG: Performed by: EMERGENCY MEDICINE

## 2024-05-18 PROCEDURE — 84484 ASSAY OF TROPONIN QUANT: CPT | Performed by: EMERGENCY MEDICINE

## 2024-05-18 PROCEDURE — 99222 1ST HOSP IP/OBS MODERATE 55: CPT | Performed by: NURSE PRACTITIONER

## 2024-05-18 PROCEDURE — 83880 ASSAY OF NATRIURETIC PEPTIDE: CPT | Performed by: EMERGENCY MEDICINE

## 2024-05-18 PROCEDURE — 83690 ASSAY OF LIPASE: CPT | Performed by: EMERGENCY MEDICINE

## 2024-05-18 PROCEDURE — 36415 COLL VENOUS BLD VENIPUNCTURE: CPT

## 2024-05-18 PROCEDURE — 71045 X-RAY EXAM CHEST 1 VIEW: CPT

## 2024-05-18 PROCEDURE — 85025 COMPLETE CBC W/AUTO DIFF WBC: CPT | Performed by: EMERGENCY MEDICINE

## 2024-05-18 RX ORDER — POLYETHYLENE GLYCOL 3350 17 G/17G
17 POWDER, FOR SOLUTION ORAL DAILY PRN
Status: DISCONTINUED | OUTPATIENT
Start: 2024-05-18 | End: 2024-05-20 | Stop reason: HOSPADM

## 2024-05-18 RX ORDER — BISACODYL 10 MG
10 SUPPOSITORY, RECTAL RECTAL DAILY PRN
Status: DISCONTINUED | OUTPATIENT
Start: 2024-05-18 | End: 2024-05-20 | Stop reason: HOSPADM

## 2024-05-18 RX ORDER — NITROGLYCERIN 20 MG/100ML
5-200 INJECTION INTRAVENOUS
Status: DISCONTINUED | OUTPATIENT
Start: 2024-05-18 | End: 2024-05-19

## 2024-05-18 RX ORDER — ACETAMINOPHEN 650 MG/1
650 SUPPOSITORY RECTAL EVERY 4 HOURS PRN
Status: DISCONTINUED | OUTPATIENT
Start: 2024-05-18 | End: 2024-05-20 | Stop reason: HOSPADM

## 2024-05-18 RX ORDER — FOLIC ACID 1 MG/1
1 TABLET ORAL EVERY 24 HOURS
Status: DISCONTINUED | OUTPATIENT
Start: 2024-05-18 | End: 2024-05-20 | Stop reason: HOSPADM

## 2024-05-18 RX ORDER — SODIUM CHLORIDE 0.9 % (FLUSH) 0.9 %
10 SYRINGE (ML) INJECTION AS NEEDED
Status: DISCONTINUED | OUTPATIENT
Start: 2024-05-18 | End: 2024-05-20 | Stop reason: HOSPADM

## 2024-05-18 RX ORDER — SODIUM CHLORIDE 0.9 % (FLUSH) 0.9 %
10 SYRINGE (ML) INJECTION EVERY 12 HOURS SCHEDULED
Status: DISCONTINUED | OUTPATIENT
Start: 2024-05-18 | End: 2024-05-20 | Stop reason: HOSPADM

## 2024-05-18 RX ORDER — BISACODYL 5 MG/1
5 TABLET, DELAYED RELEASE ORAL DAILY PRN
Status: DISCONTINUED | OUTPATIENT
Start: 2024-05-18 | End: 2024-05-20 | Stop reason: HOSPADM

## 2024-05-18 RX ORDER — VALSARTAN 80 MG/1
40 TABLET ORAL DAILY
Status: DISCONTINUED | OUTPATIENT
Start: 2024-05-19 | End: 2024-05-19

## 2024-05-18 RX ORDER — NEBIVOLOL 2.5 MG/1
2.5 TABLET ORAL DAILY
Status: DISCONTINUED | OUTPATIENT
Start: 2024-05-18 | End: 2024-05-19

## 2024-05-18 RX ORDER — LORAZEPAM 2 MG/ML
0.25 INJECTION INTRAMUSCULAR
Status: COMPLETED | OUTPATIENT
Start: 2024-05-18 | End: 2024-05-19

## 2024-05-18 RX ORDER — SODIUM CHLORIDE 9 MG/ML
40 INJECTION, SOLUTION INTRAVENOUS AS NEEDED
Status: DISCONTINUED | OUTPATIENT
Start: 2024-05-18 | End: 2024-05-20 | Stop reason: HOSPADM

## 2024-05-18 RX ORDER — ASPIRIN 81 MG/1
324 TABLET, CHEWABLE ORAL ONCE
Status: DISCONTINUED | OUTPATIENT
Start: 2024-05-18 | End: 2024-05-20 | Stop reason: HOSPADM

## 2024-05-18 RX ORDER — ACETAMINOPHEN 325 MG/1
650 TABLET ORAL EVERY 4 HOURS PRN
Status: DISCONTINUED | OUTPATIENT
Start: 2024-05-18 | End: 2024-05-20 | Stop reason: HOSPADM

## 2024-05-18 RX ORDER — UREA 10 %
5 LOTION (ML) TOPICAL NIGHTLY PRN
Status: DISCONTINUED | OUTPATIENT
Start: 2024-05-18 | End: 2024-05-20 | Stop reason: HOSPADM

## 2024-05-18 RX ORDER — DORZOLAMIDE HYDROCHLORIDE AND TIMOLOL MALEATE 20; 5 MG/ML; MG/ML
SOLUTION/ DROPS OPHTHALMIC 2 TIMES DAILY
Status: DISCONTINUED | OUTPATIENT
Start: 2024-05-18 | End: 2024-05-20 | Stop reason: HOSPADM

## 2024-05-18 RX ORDER — NITROGLYCERIN 0.4 MG/1
0.4 TABLET SUBLINGUAL
Status: DISCONTINUED | OUTPATIENT
Start: 2024-05-18 | End: 2024-05-20 | Stop reason: HOSPADM

## 2024-05-18 RX ORDER — AMOXICILLIN 250 MG
2 CAPSULE ORAL 2 TIMES DAILY PRN
Status: DISCONTINUED | OUTPATIENT
Start: 2024-05-18 | End: 2024-05-20 | Stop reason: HOSPADM

## 2024-05-18 RX ORDER — LORAZEPAM 2 MG/ML
0.5 INJECTION INTRAMUSCULAR EVERY 4 HOURS PRN
Status: DISCONTINUED | OUTPATIENT
Start: 2024-05-18 | End: 2024-05-18

## 2024-05-18 RX ORDER — ACETAMINOPHEN 160 MG/5ML
650 SOLUTION ORAL EVERY 4 HOURS PRN
Status: DISCONTINUED | OUTPATIENT
Start: 2024-05-18 | End: 2024-05-20 | Stop reason: HOSPADM

## 2024-05-18 RX ADMIN — FOLIC ACID 1 MG: 1 TABLET ORAL at 21:30

## 2024-05-18 RX ADMIN — DORZOLAMIDE HYDROCHLORIDE: 20 SOLUTION/ DROPS OPHTHALMIC at 21:31

## 2024-05-18 RX ADMIN — LORAZEPAM 0.25 MG: 2 INJECTION INTRAMUSCULAR; INTRAVENOUS at 19:11

## 2024-05-18 RX ADMIN — APIXABAN 5 MG: 5 TABLET, FILM COATED ORAL at 21:30

## 2024-05-18 RX ADMIN — Medication 10 ML: at 21:30

## 2024-05-18 RX ADMIN — NEBIVOLOL 2.5 MG: 2.5 TABLET ORAL at 21:40

## 2024-05-18 RX ADMIN — ACETAMINOPHEN 650 MG: 325 TABLET ORAL at 23:36

## 2024-05-18 RX ADMIN — NITROGLYCERIN 5 MCG/MIN: 20 INJECTION INTRAVENOUS at 20:14

## 2024-05-18 NOTE — H&P
[facility] Medicine Services  HISTORY AND PHYSICAL    Patient Name: Pipe Watkins  : 1940  MRN: [id number]  Primary Care Physician: Teddy Paredes MD  Date of admission: 2024    Subjective   Subjective     Chief Complaint:  Chest pain and hypertension    HPI:  Pipe Watkins is a 83 y.o. male with a history of SVT, SSS s/p PPM, PAF, hypertension, hyperlipidemia, depression, anxiety, presented to the ED today with complaints of chest pain and hypertension.  Patient has been experiencing episodes of hypertension this week with SBP greater than 200.  Patient was seen in the ED this week as well as urgent care for the his hypertension and was prescribed clonidine.  He has taken the clonidine 3 times today.  Initially his blood pressure will go down, but then 2 hours later it goes back up to the 200s.  This morning he had some fluttering in his chest.  This afternoon while sitting at his desk he had an episode of midsternal chest pain that radiated straight through to his back.  No associated shortness of air, diaphoresis, nausea, or vomiting.  He states that he has never had chest pain like this before in the past.  Currently he is chest pain-free.  He was started on sertraline this week by his psychiatrist, no other new medications.  He denies fevers, chills, shortness of air, abdominal pain, nausea, vomiting, or any other complaints at this time.  His first troponin is 13.  Chest x-ray shows no acute cardiopulmonary abnormality.  Patient was given aspirin in the ED, and is being admitted to the hospitalist for further evaluation and management.        Review of Systems   Constitutional: Negative.    HENT: Negative.     Eyes: Negative.    Respiratory: Negative.     Cardiovascular:  Positive for chest pain and palpitations. Negative for leg swelling.   Gastrointestinal:  Positive for diarrhea. Negative for abdominal pain, blood in stool, nausea and vomiting.   Endocrine:  Negative.    Genitourinary: Negative.    Musculoskeletal: Negative.    Skin: Negative.    Allergic/Immunologic: Negative.    Neurological: Negative.    Hematological: Negative.    Psychiatric/Behavioral: Negative.                  Personal History     Past Medical History:   Diagnosis Date    Abnormal ECG     Alcohol abuse     with no consumption for 12 years     Arrhythmia     Arthritis     Depression     Hyperlipidemia     With Statin intolerance      Hypertension     Osteoarthritis     Paroxysmal atrial fibrillation 12/22/2021    Sick sinus syndrome     SVT (supraventricular tachycardia)     Wears hearing aid     Wears prescription eyeglasses              Past Surgical History:   Procedure Laterality Date    ABLATION OF DYSRHYTHMIC FOCUS      CARDIAC ELECTROPHYSIOLOGY PROCEDURE N/A 1/18/2017    Procedure: Ablation AVNRT;  Surgeon: Gennaro Kirk MD;  Location: [facility] INVASIVE LOCATION;  Service:     COLONOSCOPY      INSERT / REPLACE / REMOVE PACEMAKER      KNEE ARTHROSCOPY W/ MENISCAL REPAIR Left     PACEMAKER IMPLANTATION  01/13/2015    [place] ER presentation  with supraventricular tachycardia at a rate of 180, converted with 12 mg of adenosine     TONSILLECTOMY         Family History:  family history includes Heart attack in his father; Hypertension in his mother.     Social History:  reports that he has never smoked. He has never been exposed to tobacco smoke. He has never used smokeless tobacco. He reports that he does not drink alcohol and does not use drugs.  Social History     Social History Narrative    Not on file       Medications:  LORazepam, Methotrexate, Vitamin A, Zinc, apixaban, cloNIDine, dorzolamide-timolol, fish oil, folic acid, magnesium oxide, methotrexate, nebivolol, neomycin-polymyxin-hydrocortisone, sertraline, valsartan, vitamin B-12, vitamin C, vitamin D3, and vitamin E    Allergies   Allergen Reactions    Other Cough    Ace Inhibitors      Cough     Lipitor [Atorvastatin]       Severe depression    Paroxetine Other (See Comments)    Penicillins Hives    Beta Adrenergic Blockers Unknown (See Comments)    Diltiazem Unknown (See Comments)       Objective   Objective     Vital Signs:   Temp:  [97.7 °F (36.5 °C)] 97.7 °F (36.5 °C)  Heart Rate:  [60-81] 60  Resp:  [20] 20  BP: (184-203)/(105-120) 184/110    Physical Exam   Constitutional: Awake, alert, resting in bed, wife at bedside  Eyes: PERRLA, sclerae anicteric, no conjunctival injection  HENT: NCAT, mucous membranes moist  Neck: Supple, no thyromegaly, no lymphadenopathy, trachea midline  Respiratory: Clear to auscultation bilaterally, nonlabored respirations   Cardiovascular: RRR, no murmurs, rubs, or gallops, palpable pedal pulses bilaterally  Gastrointestinal: Positive bowel sounds, soft, nontender, nondistended  Musculoskeletal: No bilateral ankle edema, no clubbing or cyanosis to extremities  Psychiatric: Appropriate affect, cooperative  Neurologic: Oriented x 3, strength symmetric in all extremities, Cranial Nerves grossly intact to confrontation, speech clear  Skin: No rashes       Result Review:  I have personally reviewed the results from the time of this admission to 5/18/2024 20:44 EDT and agree with these findings:  [x]  Laboratory list / accordion  []  Microbiology  [x]  Radiology  []  EKG/Telemetry   []  Cardiology/Vascular   []  Pathology  [x]  Old records  []  Other:  Most notable findings include:     LAB RESULTS:      Lab 05/18/24 1823 05/16/24 1806   WBC 5.56  --    HEMOGLOBIN 14.6  --    HEMOGLOBIN, POC  --  15.0   HEMATOCRIT 44.3  --    HEMATOCRIT POC  --  44   PLATELETS 217  --    NEUTROS ABS 3.82  --    IMMATURE GRANS (ABS) 0.02  --    LYMPHS ABS 1.04  --    MONOS ABS 0.53  --    EOS ABS 0.12  --    MCV 92.3  --          Lab 05/18/24 1823 05/16/24 1806   SODIUM 135*  --    POTASSIUM 4.3  --    CHLORIDE 98  --    CO2 30.0*  --    ANION GAP 7.0  --    BUN 19  --    CREATININE 0.92 1.30   EGFR 82.0 54.2*   GLUCOSE  158*  --    CALCIUM 9.4  --          Lab 05/18/24  1823   TOTAL PROTEIN 6.9   ALBUMIN 4.2   GLOBULIN 2.7   ALT (SGPT) 10   AST (SGOT) 13   BILIRUBIN 0.5   ALK PHOS 39   LIPASE 92*         Lab 05/18/24  1823   PROBNP 269.9   HSTROP T 13                 Brief Urine Lab Results       None          Microbiology Results (last 10 days)       ** No results found for the last 240 hours. **            XR Chest 1 View    Result Date: 5/18/2024  XR CHEST 1 VW Date of Exam: 5/18/2024 6:04 PM EDT Indication: Chest Pain Triage Protocol Comparison: None available. Findings: Cardiomediastinal silhouette is unremarkable.  No airspace disease, pneumothorax, nor pleural effusion. No acute osseous abnormality identified.     Impression: Impression: No acute process identified Electronically Signed: Mnoster Corrigan MD  5/18/2024 6:15 PM EDT  Workstation ID: [id number]         Assessment & Plan   Assessment & Plan       Hypertensive urgency    Essential hypertension    Sick sinus syndrome    Hyperlipidemia    Depression    Paroxysmal atrial fibrillation    Chest pain    Pipe Watkins is a 83 y.o. male with a history of SVT, SSS s/p PPM, PAF, hypertension, hyperlipidemia, depression, anxiety, presented to the ED today with complaints of chest pain and hypertension.      Assessment and plan:    Chest pain  Hypertensive urgency  Hyperlipidemia  -- HS troponin 13  -- Chest x-ray shows no acute process  -- Was given aspirin in the ED  -- Follows with Dr. Craft, will consult in the a.m.  -- Trend troponin  -- Continue valsartan, Bystolic  -- Intolerant of all statins and Zetia  -- npo after midnight until seen by cardiology  -- nitro drip   -- am labs    PAF  SSS s/p PPM  History of SVT  -- Normal device interrogation on 4/8/2024  -- Continue Eliquis, Bystolic    Elevated lipase  -- Lipase 92    Generalized anxiety disorder  Depression  -- Follows with [facility]        DVT prophylaxis:  On eliquis    CODE STATUS:    Level Of  Support Discussed With: Patient  Code Status (Patient has no pulse and is not breathing): CPR (Attempt to Resuscitate)  Medical Interventions (Patient has pulse or is breathing): Full Support      Expected Discharge  TBD  Expected discharge date/ time has not been documented.      This note has been completed as part of a split-shared workflow.     Signature: Electronically signed by ADOLFO Cortes, 05/18/24, 8:44 PM EDT.

## 2024-05-18 NOTE — ED NOTES
" Pipe Watkins    Nursing Report ED to Floor:  Mental status: A&Ox4  Ambulatory status: self  Oxygen Therapy:  RA  Cardiac Rhythm: NSR  Admitted from: ED  Safety Concerns:  fall  Social Issues: none  ED Room #:  08    ED Nurse Phone Extension - [phone] or may call [phone].      HPI:   Chief Complaint   Patient presents with    Chest Pain       Past Medical History:  Past Medical History:   Diagnosis Date    Abnormal ECG     Alcohol abuse     with no consumption for 12 years     Arrhythmia     Arthritis     Depression     Hyperlipidemia     With Statin intolerance      Hypertension     Osteoarthritis     Paroxysmal atrial fibrillation 12/22/2021    Sick sinus syndrome     SVT (supraventricular tachycardia)     Wears hearing aid     Wears prescription eyeglasses         Past Surgical History:  Past Surgical History:   Procedure Laterality Date    ABLATION OF DYSRHYTHMIC FOCUS      CARDIAC ELECTROPHYSIOLOGY PROCEDURE N/A 1/18/2017    Procedure: Ablation AVNRT;  Surgeon: Gennaro Kirk MD;  Location: [facility] INVASIVE LOCATION;  Service:     COLONOSCOPY      INSERT / REPLACE / REMOVE PACEMAKER      KNEE ARTHROSCOPY W/ MENISCAL REPAIR Left     PACEMAKER IMPLANTATION  01/13/2015    [place] ER presentation  with supraventricular tachycardia at a rate of 180, converted with 12 mg of adenosine     TONSILLECTOMY          Admitting Doctor:   Yuniel Augustin MD    Consulting Provider(s):  Consults       No orders found from 4/19/2024 to 5/19/2024.             Admitting Diagnosis:   The primary encounter diagnosis was Hypertensive urgency. Diagnoses of Chest pain, unspecified type and Failure of outpatient treatment were also pertinent to this visit.    Most Recent Vitals:   Vitals:    05/18/24 1755   BP: (!) 185/105   BP Location: Left arm   Patient Position: Sitting   Pulse: 81   Resp: 20   Temp: 97.7 °F (36.5 °C)   TempSrc: Oral   SpO2: 98%   Weight: 90.7 kg (200 lb)   Height: 177.8 cm (70\")       Active LDAs/IV Access: "   Lines, Drains & Airways       Active LDAs       Name Placement date Placement time Site Days    Peripheral IV 05/18/24 1828 Right Antecubital 05/18/24 1828  Antecubital  less than 1                    Labs (abnormal labs have a star):   Labs Reviewed   COMPREHENSIVE METABOLIC PANEL - Abnormal; Notable for the following components:       Result Value    Glucose 158 (*)     Sodium 135 (*)     CO2 30.0 (*)     All other components within normal limits    Narrative:     GFR Normal >60  Chronic Kidney Disease <60  Kidney Failure <15    The GFR formula is only valid for adults with stable renal function between ages 18 and 70.   LIPASE - Abnormal; Notable for the following components:    Lipase 92 (*)     All other components within normal limits   CBC WITH AUTO DIFFERENTIAL - Abnormal; Notable for the following components:    Lymphocyte % 18.7 (*)     All other components within normal limits   TROPONIN - Normal    Narrative:     High Sensitive Troponin T Reference Range:  <14.0 ng/L- Negative Female for AMI  <22.0 ng/L- Negative Male for AMI  >=14 - Abnormal Female indicating possible myocardial injury.  >=22 - Abnormal Male indicating possible myocardial injury.   Clinicians would have to utilize clinical acumen, EKG, Troponin, and serial changes to determine if it is an Acute Myocardial Infarction or myocardial injury due to an underlying chronic condition.        BNP (IN-HOUSE) - Normal    Narrative:     This assay is used as an aid in the diagnosis of individuals suspected of having heart failure. It can be used as an aid in the diagnosis of acute decompensated heart failure (ADHF) in patients presenting with signs and symptoms of ADHF to the emergency department (ED). In addition, NT-proBNP of <300 pg/mL indicates ADHF is not likely.    Age Range Result Interpretation  NT-proBNP Concentration (pg/mL:      <50             Positive            >450                   Gray                 300-450                     Negative             <300    50-75           Positive            >900                  Gray                300-900                  Negative            <300      >75             Positive            >1800                  Gray                300-1800                  Negative            <300   RAINBOW DRAW    Narrative:     The following orders were created for panel order [place] Draw.  Procedure                               Abnormality         Status                     ---------                               -----------         ------                     Green Top (Gel)[[id number]]                                  Final result               Lavender Top[[id number]]                                     Final result               Gold Top - SST[[id number]]                                   Final result               Dimas Top[[id number]]                                         Final result               Light Blue Top[[id number]]                                   Final result                 Please view results for these tests on the individual orders.   HIGH SENSITIVITIY TROPONIN T 2HR   CBC AND DIFFERENTIAL    Narrative:     The following orders were created for panel order CBC & Differential.  Procedure                               Abnormality         Status                     ---------                               -----------         ------                     CBC Auto Differential[[id number]]        Abnormal            Final result                 Please view results for these tests on the individual orders.   GREEN TOP   LAVENDER TOP   GOLD TOP - SST   GRAY TOP   LIGHT BLUE TOP       Meds Given in ED:   Medications   sodium chloride 0.9 % flush 10 mL (has no administration in time range)   aspirin chewable tablet 324 mg (324 mg Oral Not Given 5/18/24 1912)   LORazepam (ATIVAN) injection 0.25 mg (0.25 mg Intravenous Given 5/18/24 1911)           Last NIH score:                                                           Dysphagia screening results:        Oto Coma Scale:  No data recorded     CIWA:        Restraint Type:            Isolation Status:  No active isolations

## 2024-05-19 LAB
ANION GAP SERPL CALCULATED.3IONS-SCNC: 8 MMOL/L (ref 5–15)
BASOPHILS # BLD AUTO: 0.03 10*3/MM3 (ref 0–0.2)
BASOPHILS NFR BLD AUTO: 0.5 % (ref 0–1.5)
BUN SERPL-MCNC: 21 MG/DL (ref 8–23)
BUN/CREAT SERPL: 26.3 (ref 7–25)
CALCIUM SPEC-SCNC: 8.9 MG/DL (ref 8.6–10.5)
CHLORIDE SERPL-SCNC: 101 MMOL/L (ref 98–107)
CHOLEST SERPL-MCNC: 205 MG/DL (ref 0–200)
CO2 SERPL-SCNC: 27 MMOL/L (ref 22–29)
CREAT SERPL-MCNC: 0.8 MG/DL (ref 0.76–1.27)
DEPRECATED RDW RBC AUTO: 46 FL (ref 37–54)
EGFRCR SERPLBLD CKD-EPI 2021: 87.3 ML/MIN/1.73
EOSINOPHIL # BLD AUTO: 0.1 10*3/MM3 (ref 0–0.4)
EOSINOPHIL NFR BLD AUTO: 1.8 % (ref 0.3–6.2)
ERYTHROCYTE [DISTWIDTH] IN BLOOD BY AUTOMATED COUNT: 13.6 % (ref 12.3–15.4)
GLUCOSE SERPL-MCNC: 97 MG/DL (ref 65–99)
HBA1C MFR BLD: 6.1 % (ref 4.8–5.6)
HCT VFR BLD AUTO: 39.2 % (ref 37.5–51)
HDLC SERPL-MCNC: 43 MG/DL (ref 40–60)
HGB BLD-MCNC: 13.1 G/DL (ref 13–17.7)
IMM GRANULOCYTES # BLD AUTO: 0.02 10*3/MM3 (ref 0–0.05)
IMM GRANULOCYTES NFR BLD AUTO: 0.4 % (ref 0–0.5)
LDLC SERPL CALC-MCNC: 136 MG/DL (ref 0–100)
LDLC/HDLC SERPL: 3.1 {RATIO}
LYMPHOCYTES # BLD AUTO: 1.54 10*3/MM3 (ref 0.7–3.1)
LYMPHOCYTES NFR BLD AUTO: 27.7 % (ref 19.6–45.3)
MAGNESIUM SERPL-MCNC: 2.2 MG/DL (ref 1.6–2.4)
MCH RBC QN AUTO: 30.7 PG (ref 26.6–33)
MCHC RBC AUTO-ENTMCNC: 33.4 G/DL (ref 31.5–35.7)
MCV RBC AUTO: 91.8 FL (ref 79–97)
MONOCYTES # BLD AUTO: 0.61 10*3/MM3 (ref 0.1–0.9)
MONOCYTES NFR BLD AUTO: 11 % (ref 5–12)
NEUTROPHILS NFR BLD AUTO: 3.26 10*3/MM3 (ref 1.7–7)
NEUTROPHILS NFR BLD AUTO: 58.6 % (ref 42.7–76)
NRBC BLD AUTO-RTO: 0 /100 WBC (ref 0–0.2)
PLATELET # BLD AUTO: 178 10*3/MM3 (ref 140–450)
PMV BLD AUTO: 9.3 FL (ref 6–12)
POTASSIUM SERPL-SCNC: 4 MMOL/L (ref 3.5–5.2)
RBC # BLD AUTO: 4.27 10*6/MM3 (ref 4.14–5.8)
SODIUM SERPL-SCNC: 136 MMOL/L (ref 136–145)
TRIGL SERPL-MCNC: 143 MG/DL (ref 0–150)
TROPONIN T SERPL HS-MCNC: 13 NG/L
TSH SERPL DL<=0.05 MIU/L-ACNC: 1.51 UIU/ML (ref 0.27–4.2)
VLDLC SERPL-MCNC: 26 MG/DL (ref 5–40)
WBC NRBC COR # BLD AUTO: 5.56 10*3/MM3 (ref 3.4–10.8)

## 2024-05-19 PROCEDURE — 25010000002 LORAZEPAM PER 2 MG: Performed by: EMERGENCY MEDICINE

## 2024-05-19 PROCEDURE — 96376 TX/PRO/DX INJ SAME DRUG ADON: CPT

## 2024-05-19 PROCEDURE — 99214 OFFICE O/P EST MOD 30 MIN: CPT | Performed by: INTERNAL MEDICINE

## 2024-05-19 PROCEDURE — 85025 COMPLETE CBC W/AUTO DIFF WBC: CPT | Performed by: NURSE PRACTITIONER

## 2024-05-19 PROCEDURE — 83036 HEMOGLOBIN GLYCOSYLATED A1C: CPT | Performed by: NURSE PRACTITIONER

## 2024-05-19 PROCEDURE — 80061 LIPID PANEL: CPT | Performed by: NURSE PRACTITIONER

## 2024-05-19 PROCEDURE — 83735 ASSAY OF MAGNESIUM: CPT | Performed by: NURSE PRACTITIONER

## 2024-05-19 PROCEDURE — 99232 SBSQ HOSP IP/OBS MODERATE 35: CPT | Performed by: INTERNAL MEDICINE

## 2024-05-19 PROCEDURE — 84443 ASSAY THYROID STIM HORMONE: CPT | Performed by: NURSE PRACTITIONER

## 2024-05-19 PROCEDURE — 80048 BASIC METABOLIC PNL TOTAL CA: CPT | Performed by: NURSE PRACTITIONER

## 2024-05-19 PROCEDURE — 84484 ASSAY OF TROPONIN QUANT: CPT | Performed by: NURSE PRACTITIONER

## 2024-05-19 PROCEDURE — G0378 HOSPITAL OBSERVATION PER HR: HCPCS

## 2024-05-19 RX ORDER — NEBIVOLOL 2.5 MG/1
5 TABLET ORAL DAILY
Status: DISCONTINUED | OUTPATIENT
Start: 2024-05-19 | End: 2024-05-20 | Stop reason: HOSPADM

## 2024-05-19 RX ORDER — VALSARTAN 80 MG/1
80 TABLET ORAL DAILY
Status: DISCONTINUED | OUTPATIENT
Start: 2024-05-20 | End: 2024-05-20 | Stop reason: HOSPADM

## 2024-05-19 RX ORDER — NEBIVOLOL 2.5 MG/1
2.5 TABLET ORAL ONCE
Status: COMPLETED | OUTPATIENT
Start: 2024-05-19 | End: 2024-05-19

## 2024-05-19 RX ORDER — VALSARTAN 80 MG/1
40 TABLET ORAL ONCE
Status: COMPLETED | OUTPATIENT
Start: 2024-05-19 | End: 2024-05-19

## 2024-05-19 RX ADMIN — APIXABAN 5 MG: 5 TABLET, FILM COATED ORAL at 20:13

## 2024-05-19 RX ADMIN — Medication 10 ML: at 08:48

## 2024-05-19 RX ADMIN — DORZOLAMIDE HYDROCHLORIDE: 20 SOLUTION/ DROPS OPHTHALMIC at 20:19

## 2024-05-19 RX ADMIN — ACETAMINOPHEN 650 MG: 325 TABLET ORAL at 08:46

## 2024-05-19 RX ADMIN — MAGNESIUM OXIDE TAB 400 MG (241.3 MG ELEMENTAL MG) 400 MG: 400 (241.3 MG) TAB at 08:47

## 2024-05-19 RX ADMIN — NEBIVOLOL 2.5 MG: 2.5 TABLET ORAL at 11:19

## 2024-05-19 RX ADMIN — Medication 10 ML: at 20:13

## 2024-05-19 RX ADMIN — DORZOLAMIDE HYDROCHLORIDE: 20 SOLUTION/ DROPS OPHTHALMIC at 08:48

## 2024-05-19 RX ADMIN — ACETAMINOPHEN 650 MG: 325 TABLET ORAL at 21:32

## 2024-05-19 RX ADMIN — SERTRALINE 25 MG: 50 TABLET, FILM COATED ORAL at 11:18

## 2024-05-19 RX ADMIN — VALSARTAN 40 MG: 80 TABLET, FILM COATED ORAL at 08:46

## 2024-05-19 RX ADMIN — FOLIC ACID 1 MG: 1 TABLET ORAL at 20:13

## 2024-05-19 RX ADMIN — VALSARTAN 40 MG: 80 TABLET, FILM COATED ORAL at 12:22

## 2024-05-19 RX ADMIN — NEBIVOLOL 2.5 MG: 2.5 TABLET ORAL at 20:13

## 2024-05-19 RX ADMIN — LORAZEPAM 0.25 MG: 2 INJECTION INTRAMUSCULAR; INTRAVENOUS at 15:15

## 2024-05-19 RX ADMIN — APIXABAN 5 MG: 5 TABLET, FILM COATED ORAL at 08:47

## 2024-05-19 RX ADMIN — SERTRALINE 25 MG: 50 TABLET, FILM COATED ORAL at 08:47

## 2024-05-19 NOTE — PLAN OF CARE
Goal Outcome Evaluation:     A&O x4/ Anxious IV ativan given x1. Hypertensive. A-paced w PVCs. Room air. C/o shoulder pain, Tylenol given x1. Denies N/V/D. Denies chest pain. Afebrile. Per Dr. Sherwood, does not require VS q1h on night shift. No other concerns/complications assessed or stated. Pt's spouse at bedside.

## 2024-05-19 NOTE — CASE MANAGEMENT/SOCIAL WORK
Discharge Planning Assessment  [facility]     Patient Name: Pipe Watkins  MRN: [id number]  Today's Date: 5/19/2024    Admit Date: 5/18/2024    Plan: Home   Discharge Needs Assessment       Row Name 05/19/24 1323       Living Environment    People in Home spouse    Current Living Arrangements home    Potentially Unsafe Housing Conditions none    Primary Care Provided by self    Provides Primary Care For no one    Family Caregiver if Needed spouse    Quality of Family Relationships involved;supportive    Able to Return to Prior Arrangements yes       Resource/Environmental Concerns    Resource/Environmental Concerns none       Transition Planning    Patient/Family Anticipates Transition to home with family    Patient/Family Anticipated Services at Transition     Transportation Anticipated family or friend will provide       Discharge Needs Assessment    Readmission Within the Last 30 Days no previous admission in last 30 days    Equipment Currently Used at Home none    Concerns to be Addressed discharge planning    Anticipated Changes Related to Illness none    Equipment Needed After Discharge none                   Discharge Plan       Row Name 05/19/24 1325       Plan    Plan Home    Patient/Family in Agreement with Plan yes    Plan Comments Spoke with patient's wife to initiate discharge planning.  Patient lives with her in [facility].  He is independent with ADL's.  He has no DME at home and is not current with home health.  His PCP is Teddy Paredes.  He has an advanced directive in EPIC.  Verified that he has [employer] Medicare Replacement.  Mr[facility] Watkins has RX coverage [facility]  His plan is to return home at discharge.  No needs noted at this time.  CM will continue to follow.    Final Discharge Disposition Code 01 - home or self-care                  Continued Care and Services - Admitted Since 5/18/2024    No active coordination exists for this  encounter.          Demographic Summary       Row Name 05/19/24 1323       General Information    Admission Type observation    Arrived From emergency department    Referral Source admission list    Reason for Consult discharge planning                   Functional Status       Row Name 05/19/24 1323       Functional Status    Usual Activity Tolerance moderate    Current Activity Tolerance moderate       Functional Status, IADL    Medications independent    Meal Preparation independent    Housekeeping independent    Laundry independent    Shopping independent                   Psychosocial    No documentation.                  Abuse/Neglect    No documentation.                  Legal    No documentation.                  Substance Abuse    No documentation.                  Patient Forms    No documentation.                     Elaine James RN

## 2024-05-19 NOTE — CONSULTS
[facility] at [facility]  Cardiovascular Consultation Note    Reason for consultation: #1 hypertensive urgency with chest pain    History of Present Illness:  83-year-old male with history of depression, osteoarthritis, hyperlipidemia with intolerance to statin and Zetia, hypertension, arrhythmia including previous SVT.  Sick sinus syndrome with syncope status post pacemaker in 2017.  Also has a history of PAF.  Last pacer interrogation device showed 29 episodes of A-fib less than 1 minute.  Patient last saw Dr. Craft April 8, 2024.  He had a negative stress echo in 2015.  he told him he had a TIA about 10 years ago.  The patient's blood pressure began to elevate this week.  On 1 occasion he had discomfort in his chest lasted about 2 or 3 seconds.  No exertional chest pain or dyspnea..  Has occasional palpitation last about 2 to 3 seconds.  The patient is very concerned about his health, lives a very healthy lifestyle.  He does suffer with significant anxiety start taking Celebrex again in December.  Does not take any OTC NSAIDs    Cardiac risk factors: #1 male sex #2 age greater than 55 #3 hypertension #4 hyperlipidemia  His chads 2 vas score is now 5    Past medical and surgical history, social and family history reviewed in EMR.    REVIEW OF SYSTEMS:     Past Medical History:   Diagnosis Date    Abnormal ECG     Alcohol abuse     with no consumption for 12 years     Arrhythmia     Arthritis     Depression     Hyperlipidemia     With Statin intolerance      Hypertension     Osteoarthritis     Paroxysmal atrial fibrillation 12/22/2021    Sick sinus syndrome     SVT (supraventricular tachycardia)     Wears hearing aid     Wears prescription eyeglasses      Past Surgical History:   Procedure Laterality Date    ABLATION OF DYSRHYTHMIC FOCUS      CARDIAC ELECTROPHYSIOLOGY PROCEDURE N/A 1/18/2017    Procedure: Ablation AVNRT;  Surgeon: Gennaro Kirk MD;  Location: [facility]  "LOCATION;  Service:     COLONOSCOPY      INSERT / REPLACE / REMOVE PACEMAKER      KNEE ARTHROSCOPY W/ MENISCAL REPAIR Left     PACEMAKER IMPLANTATION  01/13/2015    [place] ER presentation  with supraventricular tachycardia at a rate of 180, converted with 12 mg of adenosine     TONSILLECTOMY       Social History     Socioeconomic History    Marital status:    Tobacco Use    Smoking status: Never     Passive exposure: Never    Smokeless tobacco: Never   Vaping Use    Vaping status: Never Used   Substance and Sexual Activity    Alcohol use: No     Comment: 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    Cardiac: History of prior syncope.  Had a dual-chamber pacemaker placed 2017.  Has a history of SVT in the past.  Complained of chest pain which lasted 2 seconds.  He is very active and denies tightness heaviness squeezing pressure chest jaw throat arms.  He states he is able to climb stairs easily with no chest pain or shortness of breath.  No edema  Respiratory: No dyspnea no wheezing.  Lower Extremities: No edema or claudication  GI: No nausea vomiting diarrhea bright blood per rectum melena  Neuro: Apparently had recent neurologic symptoms down in [place]  Hematology: No history of bleeding diathesis ecchymosis or petechiae  Renal: No hematuria or chronic kidney disease  Musculoskeletal: Has osteoarthritis  Endocrine: No hypothyroidism  Constitutional: No fever chills or weight loss  Psych: Complains of depression and anxiety      Physical Exam: General well-developed well-nourished male looks much younger than stated age.  Current blood pressure 197       HEENT: No JVP or bruits       Respiratory: Equal bilateral symmetrical expansion\" bilaterally       Cardiovascular: " Regular rate and rhythm without murmur gallop or click and no edema palpation       GI: Soft flat no obvious bruits       Lower Extremities: No lesions       Neuro: Facial expressions are symmetrical moves all 4 extremities       Skin: Warm and dry no edema palpation       Psych: Pleasant affect orient x 3    Results Review: I personally reviewed the EKGs which show paced atrial rhythm, right bundle branch block, since been ventricular.  No ischemia.  HST x 2 is negative.  BNP is negative.  Hemoglobin is normal.  GFR is 87           Vital Sign Min/Max for last 24 hours  Temp  Min: 96.2 °F (35.7 °C)  Max: 97.9 °F (36.6 °C)   BP  Min: 115/73  Max: 203/120   Pulse  Min: 60  Max: 81   Resp  Min: 18  Max: 20   SpO2  Min: 94 %  Max: 99 %   No data recorded      Intake/Output Summary (Last 24 hours) at 5/19/2024 0913  Last data filed at 5/19/2024 0754  Gross per 24 hour   Intake --   Output 250 ml   Net -250 ml             Current Facility-Administered Medications:     acetaminophen (TYLENOL) tablet 650 mg, 650 mg, Oral, Q4H PRN, 650 mg at 05/19/24 0846 **OR** acetaminophen (TYLENOL) 160 MG/5ML oral solution 650 mg, 650 mg, Oral, Q4H PRN **OR** acetaminophen (TYLENOL) suppository 650 mg, 650 mg, Rectal, Q4H PRN, Ana Smith, APRN    apixaban (ELIQUIS) tablet 5 mg, 5 mg, Oral, BID, Ana Smith, APRN, 5 mg at 05/19/24 0847    aspirin chewable tablet 324 mg, 324 mg, Oral, Once, Uvaldo You, DO    sennosides-docusate (PERICOLACE) 8.6-50 MG per tablet 2 tablet, 2 tablet, Oral, BID PRN **AND** polyethylene glycol (MIRALAX) packet 17 g, 17 g, Oral, Daily PRN **AND** bisacodyl (DULCOLAX) EC tablet 5 mg, 5 mg, Oral, Daily PRN **AND** bisacodyl (DULCOLAX) suppository 10 mg, 10 mg, Rectal, Daily PRN, Ana Smith, APRN    dorzolamide (TRUSOPT) 2 % 1 drop, timolol (TIMOPTIC) 0.5 % 1 drop for Cosopt 22.3-6.8 mg/mL, , Both Eyes, BID, Ana Smith APRN, Given at 05/19/24 0848    folic acid (FOLVITE)  tablet 1 mg, 1 mg, Oral, Q24H, Ana Smith, APRN, 1 mg at 05/18/24 2130    LORazepam (ATIVAN) injection 0.25 mg, 0.25 mg, Intravenous, Q10 Min PRN, Uvadlo You, DO, 0.25 mg at 05/18/24 1911    magnesium oxide (MAG-OX) tablet 400 mg, 400 mg, Oral, Daily, Ana Smith, APRN, 400 mg at 05/19/24 0847    melatonin tablet 5 mg, 5 mg, Oral, Nightly PRN, Ana Smith W, APRN    nebivolol (BYSTOLIC) tablet 2.5 mg, 2.5 mg, Oral, Daily, Ana Smith W, APRN, 2.5 mg at 05/18/24 2140    nitroglycerin (NITROSTAT) SL tablet 0.4 mg, 0.4 mg, Sublingual, Q5 Min PRN, Ana Smith, APRN    sertraline (ZOLOFT) tablet 25 mg, 25 mg, Oral, Daily, Omari Smitha W, APRN, 25 mg at 05/19/24 0847    sodium chloride 0.9 % flush 10 mL, 10 mL, Intravenous, PRN, Ana Smith W, APRN    sodium chloride 0.9 % flush 10 mL, 10 mL, Intravenous, Q12H, Ana Smith W, APRN, 10 mL at 05/19/24 0848    sodium chloride 0.9 % flush 10 mL, 10 mL, Intravenous, PRN, Ana Smith, APRN    sodium chloride 0.9 % infusion 40 mL, 40 mL, Intravenous, PRN, Ana Smith W, APRN    valsartan (DIOVAN) tablet 40 mg, 40 mg, Oral, Daily, Ana Smith W, APRN, 40 mg at 05/19/24 0846    Lab Review:   Results from last 7 days   Lab Units 05/19/24  0459 05/18/24  1823 05/16/24  1806   WBC 10*3/mm3 5.56 5.56  --    HEMOGLOBIN g/dL 13.1 14.6  --    HEMOGLOBIN, POC g/dL  --   --  15.0   PLATELETS 10*3/mm3 178 217  --      Results from last 7 days   Lab Units 05/19/24  0459 05/18/24  1823 05/16/24  1806   SODIUM mmol/L 136 135*  --    POTASSIUM mmol/L 4.0 4.3  --    CO2 mmol/L 27.0 30.0*  --    BUN mg/dL 21 19  --    CREATININE mg/dL 0.80 0.92 1.30   MAGNESIUM mg/dL 2.2  --   --    GLUCOSE mg/dL 97 158*  --      Estimated Creatinine Clearance: 77.7 mL/min (by C-G formula based on SCr of 0.8 mg/dL).    Results from last 7 days   Lab Units 05/19/24  0459   HEMOGLOBIN A1C % 6.10*     .lipd  Lab Results   Component  "Value Date    CHLPL 276 (H) 08/25/2015    TRIG 143 05/19/2024    HDL 43 05/19/2024    AST 13 05/18/2024    ALT 10 05/18/2024       Radiology Reports:  Imaging Results (Last 72 Hours)       Procedure Component Value Units Date/Time    XR Chest 1 View [[id number]] Collected: 05/18/24 1814     Updated: 05/18/24 1818    Narrative:      XR CHEST 1 VW    Date of Exam: 5/18/2024 6:04 PM EDT    Indication: Chest Pain Triage Protocol    Comparison: None available.    Findings:  Cardiomediastinal silhouette is unremarkable.  No airspace disease, pneumothorax, nor pleural effusion. No acute osseous abnormality identified.      Impression:      Impression:  No acute process identified      Electronically Signed: Monster Corrigan MD    5/18/2024 6:15 PM EDT    Workstation ID: [id number]            Assessment/Plan: This patient presents with significant elevation of blood pressure and chest pain.  The chest pain only lasted a couple seconds does not sound ischemic.  He is having no exertional chest pain or shortness of breath.  This time no need for an ischemic evaluation.  His blood pressure started going up on Monday.  He has been restarted on Zoloft but that was not until Tuesday.  He is anxious and suffers from depression.  At this time I will increase his Bystolic to 5 mg daily \"given extra dose of 2.5 now, increase Diovan to 80 mg daily.  He can take as needed clonidine at home if his blood pressure goes really high.  I have encouraged him not to be obsessed with taking his blood pressure.  Of note he recently had a change in his GFR but is returned to normal.  Since he does have vascular disease is a possibility of renal artery stenosis which can be assessed as an outpatient.  Recommend that he see Dr. Craft when he gets back from [place] in about 3 to 4 weeks.  Recommend that he come to heart valve center this week for blood pressure check  2 history of atrial fibrillation-he is on Eliquis and Bystolic      Robb Milton, " MD  05/19/24  09:13 EDT

## 2024-05-19 NOTE — PROGRESS NOTES
[facility] Medicine Services  PROGRESS NOTE    Patient Name: Pipe Watkins  : 1940  MRN: [id number]    Date of Admission: 2024  Primary Care Physician: Teddy Paredes MD    Subjective   Subjective     CC:  Chest pain, uncontrolled hypertension    HPI:  I have seen the patient multiple times today.  Resting in bed in no acute distress but quite anxious.  His blood pressure is much better controlled but had one low reading of systolic blood pressure of 85.  Patient is quite concerned about that and is anxious.  Will keep the patient overnight and hopefully discharge tomorrow morning.  Denies any fever or chills.  No chest pain or palpitation or shortness of breath.  No nausea vomiting or diarrhea or abdominal pain.  Patient's wife is also present at the bedside.      Objective   Objective     Vital Signs:   Temp:  [96.2 °F (35.7 °C)-97.9 °F (36.6 °C)] 97.2 °F (36.2 °C)  Heart Rate:  [59-81] 63  Resp:  [18-20] 18  BP: (115-203)/() 181/87     Physical Exam:  Constitutional: No acute distress, awake, alert  HENT: NCAT, mucous membranes moist  Respiratory: Clear to auscultation bilaterally, respiratory effort normal   Cardiovascular: RRR, no murmurs, rubs, or gallops  Gastrointestinal: Positive bowel sounds, soft, nontender, nondistended  Musculoskeletal: No bilateral ankle edema  Psychiatric: Appropriate affect, cooperative  Neurologic: Oriented x 3, strength symmetric in all extremities, Cranial Nerves grossly intact to confrontation, speech clear  Skin: No rashes     Results Reviewed:  LAB RESULTS:      Lab 24  0459 24  1823 24  1806   WBC 5.56 5.56  --    HEMOGLOBIN 13.1 14.6  --    HEMOGLOBIN, POC  --   --  15.0   HEMATOCRIT 39.2 44.3  --    HEMATOCRIT POC  --   --  44   PLATELETS 178 217  --    NEUTROS ABS 3.26 3.82  --    IMMATURE GRANS (ABS) 0.02 0.02  --    LYMPHS ABS 1.54 1.04  --    MONOS ABS 0.61 0.53  --    EOS ABS 0.10 0.12  --    MCV 91.8  92.3  --          Lab 05/19/24  0459 05/18/24  1823 05/16/24  1806   SODIUM 136 135*  --    POTASSIUM 4.0 4.3  --    CHLORIDE 101 98  --    CO2 27.0 30.0*  --    ANION GAP 8.0 7.0  --    BUN 21 19  --    CREATININE 0.80 0.92 1.30   EGFR 87.3 82.0 54.2*   GLUCOSE 97 158*  --    CALCIUM 8.9 9.4  --    MAGNESIUM 2.2  --   --    HEMOGLOBIN A1C 6.10*  --   --    TSH 1.510  --   --          Lab 05/18/24  1823   TOTAL PROTEIN 6.9   ALBUMIN 4.2   GLOBULIN 2.7   ALT (SGPT) 10   AST (SGOT) 13   BILIRUBIN 0.5   ALK PHOS 39   LIPASE 92*         Lab 05/19/24  0459 05/18/24  2106 05/18/24  1823   PROBNP  --   --  269.9   HSTROP T 13 12 13         Lab 05/19/24  0459   CHOLESTEROL 205*   LDL CHOL 136*   HDL CHOL 43   TRIGLYCERIDES 143             Brief Urine Lab Results       None            Microbiology Results Abnormal       None            XR Chest 1 View    Result Date: 5/18/2024  XR CHEST 1 VW Date of Exam: 5/18/2024 6:04 PM EDT Indication: Chest Pain Triage Protocol Comparison: None available. Findings: Cardiomediastinal silhouette is unremarkable.  No airspace disease, pneumothorax, nor pleural effusion. No acute osseous abnormality identified.     Impression: Impression: No acute process identified Electronically Signed: Monster Corrigan MD  5/18/2024 6:15 PM EDT  Workstation ID: [id number]         Current medications:  Scheduled Meds:apixaban, 5 mg, Oral, BID  aspirin, 324 mg, Oral, Once  dorzolamide (TRUSOPT) 2 % 1 drop, timolol (TIMOPTIC) 0.5 % 1 drop for Cosopt 22.3-6.8 mg/mL, , Both Eyes, BID  folic acid, 1 mg, Oral, Q24H  magnesium oxide, 400 mg, Oral, Daily  nebivolol, 5 mg, Oral, Daily  [START ON 5/20/2024] sertraline, 50 mg, Oral, Daily  sodium chloride, 10 mL, Intravenous, Q12H  [START ON 5/20/2024] valsartan, 80 mg, Oral, Daily      Continuous Infusions:   PRN Meds:.  acetaminophen **OR** acetaminophen **OR** acetaminophen    senna-docusate sodium **AND** polyethylene glycol **AND** bisacodyl **AND** bisacodyl     melatonin    nitroglycerin    sodium chloride    sodium chloride    sodium chloride    Assessment & Plan   Assessment & Plan     Active Hospital Problems    Diagnosis  POA    **Hypertensive urgency [I16.0]  Yes    Chest pain [R07.9]  Unknown    Paroxysmal atrial fibrillation [I48.0]  Yes    Sick sinus syndrome [I49.5]  Yes    Hyperlipidemia [E78.5]  Yes    Depression [F32.A]  Yes    Essential hypertension [I10]  Yes      Resolved Hospital Problems   No resolved problems to display.        Brief Hospital Course to date:  Pipe Watkins is a 83 y.o. male with past medical history difficult for SVT, sick sinus syndrome s/p PPM, paroxysmal atrial fibrillation, hypertension, hyperlipidemia, anxiety and depression.  Patient was admitted for uncontrolled hypertension and chest pain.       Chest pain  Hypertensive urgency  Hyperlipidemia  -- HS troponin 13  -- Chest x-ray shows no acute process  -- Was given aspirin in the ED  -- Cardiology has seen and evaluated patient.  --Pain is completely resolved  --Patient's blood pressure medications Bystolic and losartan we were increased and blood pressure is better controlled.     PAF  SSS s/p PPM  History of SVT  -- Normal device interrogation on 4/8/2024  -- Continue Eliquis, Bystolic     Elevated lipase  -- Lipase 92     Generalized anxiety disorder  Depression  -- Follows with [facility]    Note: I spent a total of 85 minutes  Expected Discharge Location and Transportation: Home  Expected Discharge early tomorrow  Expected discharge date/ time has not been documented.     DVT prophylaxis:  Medical and mechanical DVT prophylaxis orders are present.         AM-PAC 6 Clicks Score (PT): 24 (05/19/24 0754)    CODE STATUS:   Code Status and Medical Interventions:   Ordered at: 05/18/24 2044     Level Of Support Discussed With:    Patient     Code Status (Patient has no pulse and is not breathing):    CPR (Attempt to Resuscitate)     Medical Interventions (Patient  has pulse or is breathing):    Full Support       Kelechi Sherwood MD  05/19/24

## 2024-05-19 NOTE — PROGRESS NOTES
"Nutrition Services    Patient Name:  Pipe Watkins  YOB: 1940  MRN: [id number]  Admit Date:  5/18/2024    Patient identified to be at nutrition risk per nurse admission screen based on indicator of \"unsure if weight loss\". Per EMR weight hx, pt wt has been stable > past year. Note pt had ~6# wt gain and then loss within a 1 yr timeframe - suspect normal fluctuations. Pt does not currently meet screen criteria for nutrition risk. RD will continue to follow per protocol.      Electronically signed by:  Karissa Wright MS,RD,LD  05/19/24 08:14 EDT   " Benzoyl Peroxide Pregnancy And Lactation Text: This medication is Pregnancy Category C. It is unknown if benzoyl peroxide is excreted in breast milk.

## 2024-05-19 NOTE — PLAN OF CARE
Goal Outcome Evaluation:                   Problem: Adult Inpatient Plan of Care  Goal: Plan of Care Review  5/18/2024 2239 by Jessie Koch RN  Outcome: Ongoing, Progressing  5/18/2024 2239 by Jessie Koch RN  Outcome: Ongoing, Progressing  Goal: Patient-Specific Goal (Individualized)  5/18/2024 2239 by Jessie Koch RN  Outcome: Ongoing, Progressing  5/18/2024 2239 by Jessie Koch RN  Outcome: Ongoing, Progressing  Goal: Absence of Hospital-Acquired Illness or Injury  5/18/2024 2239 by Jessie Koch RN  Outcome: Ongoing, Progressing  5/18/2024 2239 by Jessie Koch RN  Outcome: Ongoing, Progressing  Intervention: Identify and Manage Fall Risk  Recent Flowsheet Documentation  Taken 5/18/2024 2200 by Jessie Koch RN  Safety Promotion/Fall Prevention:   assistive device/personal items within reach   clutter free environment maintained   lighting adjusted   nonskid shoes/slippers when out of bed   room organization consistent   safety round/check completed  Taken 5/18/2024 2040 by Jessie Koch RN  Safety Promotion/Fall Prevention:   assistive device/personal items within reach   clutter free environment maintained   lighting adjusted   nonskid shoes/slippers when out of bed   room organization consistent   safety round/check completed  Intervention: Prevent Skin Injury  Recent Flowsheet Documentation  Taken 5/18/2024 2200 by Jessie Koch RN  Body Position: position changed independently  Taken 5/18/2024 2040 by Jessie Koch RN  Body Position: position changed independently  Skin Protection:   transparent dressing maintained   tubing/devices free from skin contact  Intervention: Prevent and Manage VTE (Venous Thromboembolism) Risk  Recent Flowsheet Documentation  Taken 5/18/2024 2200 by Jessie Koch RN  Activity Management: up ad isabell  Taken 5/18/2024 2040 by Jessie Koch RN  Activity Management: up ad isabell  Range of Motion: active ROM (range of motion) encouraged  Intervention: Prevent Infection  Recent  Flowsheet Documentation  Taken 5/18/2024 2200 by Jessie Koch RN  Infection Prevention:   environmental surveillance performed   hand hygiene promoted   rest/sleep promoted   single patient room provided  Taken 5/18/2024 2040 by Jessie Koch RN  Infection Prevention:   environmental surveillance performed   hand hygiene promoted   rest/sleep promoted   single patient room provided  Goal: Optimal Comfort and Wellbeing  5/18/2024 2239 by Jessie Koch RN  Outcome: Ongoing, Progressing  5/18/2024 2239 by Jessie Koch RN  Outcome: Ongoing, Progressing  Intervention: Provide Person-Centered Care  Recent Flowsheet Documentation  Taken 5/18/2024 2040 by Jessie Koch RN  Trust Relationship/Rapport:   care explained   choices provided   emotional support provided   empathic listening provided   questions answered   questions encouraged  Goal: Readiness for Transition of Care  5/18/2024 2239 by Jessie Koch RN  Outcome: Ongoing, Progressing  5/18/2024 2239 by Jessie Koch RN  Outcome: Ongoing, Progressing  Intervention: Mutually Develop Transition Plan  Recent Flowsheet Documentation  Taken 5/18/2024 2237 by Jessie Koch RN  Equipment Currently Used at Home: bp cuff  Transportation Anticipated: family or friend will provide  Patient/Family Anticipated Services at Transition: none  Patient/Family Anticipates Transition to: home with family     Problem: Hypertension Comorbidity  Goal: Blood Pressure in Desired Range  Outcome: Ongoing, Progressing  Intervention: Maintain Blood Pressure Management  Recent Flowsheet Documentation  Taken 5/18/2024 2040 by Jessie Koch RN  Medication Review/Management: medications reviewed

## 2024-05-20 ENCOUNTER — READMISSION MANAGEMENT (OUTPATIENT)
Dept: CALL CENTER | Facility: HOSPITAL | Age: 84
End: 2024-05-20
Payer: MEDICARE

## 2024-05-20 ENCOUNTER — TELEPHONE (OUTPATIENT)
Age: 84
End: 2024-05-20
Payer: MEDICARE

## 2024-05-20 VITALS
BODY MASS INDEX: 28.49 KG/M2 | WEIGHT: 199 LBS | DIASTOLIC BLOOD PRESSURE: 90 MMHG | SYSTOLIC BLOOD PRESSURE: 145 MMHG | HEART RATE: 60 BPM | RESPIRATION RATE: 18 BRPM | HEIGHT: 70 IN | TEMPERATURE: 97.5 F | OXYGEN SATURATION: 95 %

## 2024-05-20 PROCEDURE — 99239 HOSP IP/OBS DSCHRG MGMT >30: CPT | Performed by: INTERNAL MEDICINE

## 2024-05-20 PROCEDURE — G0378 HOSPITAL OBSERVATION PER HR: HCPCS

## 2024-05-20 RX ORDER — VALSARTAN 80 MG/1
80 TABLET ORAL DAILY
Qty: 30 TABLET | Refills: 0 | Status: SHIPPED | OUTPATIENT
Start: 2024-05-21 | End: 2024-05-22

## 2024-05-20 RX ORDER — NEBIVOLOL 5 MG/1
5 TABLET ORAL DAILY
Qty: 30 TABLET | Refills: 0 | Status: SHIPPED | OUTPATIENT
Start: 2024-05-21

## 2024-05-20 RX ADMIN — DORZOLAMIDE HYDROCHLORIDE: 20 SOLUTION/ DROPS OPHTHALMIC at 08:27

## 2024-05-20 RX ADMIN — VALSARTAN 80 MG: 80 TABLET, FILM COATED ORAL at 05:55

## 2024-05-20 RX ADMIN — MAGNESIUM OXIDE TAB 400 MG (241.3 MG ELEMENTAL MG) 400 MG: 400 (241.3 MG) TAB at 08:27

## 2024-05-20 RX ADMIN — APIXABAN 5 MG: 5 TABLET, FILM COATED ORAL at 08:27

## 2024-05-20 RX ADMIN — Medication 10 ML: at 08:27

## 2024-05-20 RX ADMIN — NEBIVOLOL 5 MG: 2.5 TABLET ORAL at 08:29

## 2024-05-20 RX ADMIN — SERTRALINE 50 MG: 50 TABLET, FILM COATED ORAL at 08:27

## 2024-05-20 NOTE — DISCHARGE SUMMARY
[facility] Medicine Services  DISCHARGE SUMMARY    Patient Name: Pipe Watkins  : 1940  MRN: [id number]    Date of Admission: 2024  6:02 PM  Date of Discharge:  24  Primary Care Physician: Teddy Paredes MD    Consults       Date and Time Order Name Status Description    2024  9:04 PM Inpatient Cardiology Consult Completed             Hospital Course     Presenting Problem: Hypertensive emergency    Active Hospital Problems    Diagnosis  POA    **Hypertensive urgency [I16.0]  Yes    Chest pain [R07.9]  Unknown    Paroxysmal atrial fibrillation [I48.0]  Yes    Sick sinus syndrome [I49.5]  Yes    Hyperlipidemia [E78.5]  Yes    Depression [F32.A]  Yes    Essential hypertension [I10]  Yes      Resolved Hospital Problems   No resolved problems to display.          Hospital Course:  Pipe Watkins is a 83 y.o. male  with past medical history significant for SVT, sick sinus syndrome s/p PPM, paroxysmal atrial fibrillation on Eliquis, hypertension, hyperlipidemia, anxiety and depression.  Patient was admitted for uncontrolled hypertension and chest pain.        Chest pain  Hypertensive urgency  Hyperlipidemia  -- HS troponin 13  -- Chest x-ray shows no acute process  -- Was given aspirin in the ED  -- Cardiology has seen and evaluated patient.  -- Chest pain is completely resolved  --Patient's blood pressure medications Bystolic and losartan  were increased and blood pressure is better controlled although still not at target.  Blood pressure medication needs further adjustment which we will defer to PCP.  I do long conversation with the patient and his wife at the bedside.  I recommended that patient make a log of his blood pressure readings and discuss it with the primary care physician during the first follow-up visit.     PAF  SSS s/p PPM  History of SVT  -- Normal device interrogation on 2024  -- Continue Eliquis, Bystolic     Elevated lipase  -- Lipase  92  --No abdominal pain.     Generalized anxiety disorder  Depression  -- Follows with [facility]      Discharge Follow Up Recommendations for outpatient labs/diagnostics:       Day of Discharge     HPI:   Resting in bed no acute distress and overall feels okay.  Still is somewhat anxious.  No fever or chills.  No chest pain or palpitation or shortness of breath.  No nausea vomiting or diarrhea or abdominal pain.  Patient ambulates with no difficulty.    Review of Systems  As above    Vital Signs:   Temp:  [97 °F (36.1 °C)-97.9 °F (36.6 °C)] 97.5 °F (36.4 °C)  Heart Rate:  [59-78] 60  Resp:  [18-20] 18  BP: (104-194)/() 145/90      Physical Exam:  Constitutional: No acute distress, awake, alert  HENT: NCAT, mucous membranes moist  Respiratory: Clear to auscultation bilaterally, respiratory effort normal   Cardiovascular: Pacemaker pulse, no murmurs, rubs, or gallops  Gastrointestinal: Positive bowel sounds, soft, nontender, nondistended  Musculoskeletal: No bilateral ankle edema  Psychiatric: Anxious affect, cooperative  Neurologic: Oriented x 3, strength symmetric in all extremities, Cranial Nerves grossly intact to confrontation, speech clear  Skin: No rashes     Pertinent  and/or Most Recent Results     LAB RESULTS:      Lab 05/19/24  0459 05/18/24  1823 05/16/24  1806   WBC 5.56 5.56  --    HEMOGLOBIN 13.1 14.6  --    HEMOGLOBIN, POC  --   --  15.0   HEMATOCRIT 39.2 44.3  --    HEMATOCRIT POC  --   --  44   PLATELETS 178 217  --    NEUTROS ABS 3.26 3.82  --    IMMATURE GRANS (ABS) 0.02 0.02  --    LYMPHS ABS 1.54 1.04  --    MONOS ABS 0.61 0.53  --    EOS ABS 0.10 0.12  --    MCV 91.8 92.3  --          Lab 05/19/24  0459 05/18/24  1823 05/16/24  1806   SODIUM 136 135*  --    POTASSIUM 4.0 4.3  --    CHLORIDE 101 98  --    CO2 27.0 30.0*  --    ANION GAP 8.0 7.0  --    BUN 21 19  --    CREATININE 0.80 0.92 1.30   EGFR 87.3 82.0 54.2*   GLUCOSE 97 158*  --    CALCIUM 8.9 9.4  --    MAGNESIUM  2.2  --   --    HEMOGLOBIN A1C 6.10*  --   --    TSH 1.510  --   --          Lab 05/18/24  1823   TOTAL PROTEIN 6.9   ALBUMIN 4.2   GLOBULIN 2.7   ALT (SGPT) 10   AST (SGOT) 13   BILIRUBIN 0.5   ALK PHOS 39   LIPASE 92*         Lab 05/19/24  0459 05/18/24  2106 05/18/24  1823   PROBNP  --   --  269.9   HSTROP T 13 12 13         Lab 05/19/24  0459   CHOLESTEROL 205*   LDL CHOL 136*   HDL CHOL 43   TRIGLYCERIDES 143             Brief Urine Lab Results       None          Microbiology Results (last 10 days)       ** No results found for the last 240 hours. **            XR Chest 1 View    Result Date: 5/18/2024  XR CHEST 1 VW Date of Exam: 5/18/2024 6:04 PM EDT Indication: Chest Pain Triage Protocol Comparison: None available. Findings: Cardiomediastinal silhouette is unremarkable.  No airspace disease, pneumothorax, nor pleural effusion. No acute osseous abnormality identified.     Impression: No acute process identified Electronically Signed: Monster Corrigan MD  5/18/2024 6:15 PM EDT  Workstation ID: [id number]     Results for orders placed during the hospital encounter of 08/10/23    Duplex Carotid Ultrasound CAR    Interpretation Summary    Right internal carotid artery demonstrates a less than 50% stenosis.    Left internal carotid artery demonstrates a less than 50% stenosis.     Left Vertebral: Stenosis present.      Results for orders placed during the hospital encounter of 08/10/23    Duplex Carotid Ultrasound CAR    Interpretation Summary    Right internal carotid artery demonstrates a less than 50% stenosis.    Left internal carotid artery demonstrates a less than 50% stenosis.     Left Vertebral: Stenosis present.          Plan for Follow-up of Pending Labs/Results:     Discharge Details        Discharge Medications        Changes to Medications        Instructions Start Date   nebivolol 5 MG tablet  Commonly known as: BYSTOLIC  What changed:   medication strength  how much to take   5 mg, Oral, Daily   Start  Date: May 21, 2024     valsartan 80 MG tablet  Commonly known as: DIOVAN  What changed:   medication strength  how much to take   80 mg, Oral, Daily   Start Date: May 21, 2024            Continue These Medications        Instructions Start Date   cloNIDine 0.1 MG tablet  Commonly known as: CATAPRES   Take 0.5 tablet by mouth as needed systolic blood pressure greater 160      dorzolamide-timolol 2-0.5 % ophthalmic solution  Commonly known as: COSOPT   INSTILL 1 DROP IN BOTH EYES EVERY 12 HOURS      Eliquis 5 MG tablet tablet  Generic drug: apixaban   5 mg, Oral, 2 Times Daily      fish oil 1000 MG capsule capsule   Oral, Nightly      folic acid 1 MG tablet  Commonly known as: FOLVITE   Oral, Every 24 Hours      LORazepam 0.5 MG tablet  Commonly known as: Ativan   0.25 mg, Oral, As Needed      magnesium oxide 400 MG tablet  Commonly known as: MAG-OX   400 mg, Oral, Daily      methotrexate 2.5 MG tablet   7.5 mg, Oral, Weekly      Methotrexate 7.5 MG/0.3ML solution prefilled syringe   Subcutaneous      neomycin-polymyxin-hydrocortisone 1 % solution otic solution  Commonly known as: CORTISPORIN   3 drops, Right Ear, 4 Times Daily      sertraline 25 MG tablet  Commonly known as: Zoloft   Start with 1 tablet daily. May increase to two tablets daily after 1-2 weeks      Vitamin A 2400 MCG (8000 UT) tablet   Oral      vitamin B-12 1000 MCG tablet  Commonly known as: CYANOCOBALAMIN   1,000 mcg, Oral, Daily      vitamin C 250 MG tablet  Commonly known as: ASCORBIC ACID   1,000 mg, Oral, Daily      vitamin D3 125 MCG (5000 UT) capsule capsule   4,000 Units, Oral, Daily      vitamin E 100 UNIT capsule   400 Units, Oral, Daily      Zinc 50 MG capsule   50 mg, Oral, Daily               Allergies   Allergen Reactions    Other Cough    Ace Inhibitors      Cough     Lipitor [Atorvastatin]      Severe depression    Paroxetine Other (See Comments)    Penicillins Hives    Diltiazem Unknown (See Comments)         Discharge  Disposition:  Home or Self Care    Diet:  Hospital:  Diet Order   Procedures    Diet: Vegetarian; Vegan (No animal products); Texture: Regular (IDDSI 7); Fluid Consistency: Thin (IDDSI 0)       Diet Instructions       Diet: Regular/House Diet, Cardiac Diets; Healthy Heart (2-3 Na+); Thin (IDDSI 0)      Discharge Diet:  Regular/House Diet  Cardiac Diets       Cardiac Diet: Healthy Heart (2-3 Na+)    Fluid Consistency: Thin (IDDSI 0)             Activity:  Activity Instructions       Activity as Tolerated              Restrictions or Other Recommendations:         CODE STATUS:    Code Status and Medical Interventions:   Ordered at: 05/18/24 2044     Level Of Support Discussed With:    Patient     Code Status (Patient has no pulse and is not breathing):    CPR (Attempt to Resuscitate)     Medical Interventions (Patient has pulse or is breathing):    Full Support       Future Appointments   Date Time Provider Department Center   5/22/2024  1:00 PM Teddy Paredes MD [facility]   6/14/2024 11:30 AM Chyna Cedeño MD [facility]   7/8/2024  2:00 PM Chyna Cedeño MD [facility]   11/6/2024  3:30 PM Parish Craft III, MD [facility]       Additional Instructions for the Follow-ups that You Need to Schedule       Discharge Follow-up with PCP   As directed       Currently Documented PCP:    Teddy Paredes MD    PCP Phone Number:    [phone]     Follow Up Details: within one week                      Kelechi Sherwood MD  05/20/24      Time Spent on Discharge:  I spent  38  minutes on this discharge activity which included: face-to-face encounter with the patient, reviewing the data in the system, coordination of the care with the nursing staff as well as consultants, documentation, and entering orders.

## 2024-05-20 NOTE — TELEPHONE ENCOUNTER
"Called Pt to inform per provider \"Since he just started the medication a week ago, we should not increase yet. It will take a little time to build up in his system. I hope that he is feeling better!\" Pt verbalized understanding and did not have any questions at this time.  "

## 2024-05-20 NOTE — PLAN OF CARE
Goal Outcome Evaluation:                     Problem: Adult Inpatient Plan of Care  Goal: Plan of Care Review  Outcome: Ongoing, Progressing  Goal: Patient-Specific Goal (Individualized)  Outcome: Ongoing, Progressing  Goal: Absence of Hospital-Acquired Illness or Injury  Outcome: Ongoing, Progressing  Intervention: Identify and Manage Fall Risk  Recent Flowsheet Documentation  Taken 5/20/2024 0000 by Jessie Koch RN  Safety Promotion/Fall Prevention:   activity supervised   assistive device/personal items within reach   clutter free environment maintained   nonskid shoes/slippers when out of bed   room organization consistent   safety round/check completed  Taken 5/19/2024 2200 by Jessie Koch RN  Safety Promotion/Fall Prevention:   assistive device/personal items within reach   clutter free environment maintained   lighting adjusted   nonskid shoes/slippers when out of bed   room organization consistent   safety round/check completed  Taken 5/19/2024 2000 by Jessie Koch RN  Safety Promotion/Fall Prevention:   assistive device/personal items within reach   clutter free environment maintained   lighting adjusted   nonskid shoes/slippers when out of bed   room organization consistent   safety round/check completed  Intervention: Prevent Skin Injury  Recent Flowsheet Documentation  Taken 5/20/2024 0000 by Jessie Koch RN  Body Position: position changed independently  Taken 5/19/2024 2200 by Jessie Koch RN  Body Position: position changed independently  Taken 5/19/2024 2000 by Jessie Koch RN  Body Position: position changed independently  Intervention: Prevent and Manage VTE (Venous Thromboembolism) Risk  Recent Flowsheet Documentation  Taken 5/20/2024 0000 by Jessie Koch RN  Activity Management: up ad isabell  Taken 5/19/2024 2200 by Jessie Koch RN  Activity Management: up ad isabell  Taken 5/19/2024 2000 by Jessie Koch RN  Activity Management: up ad isabell  Range of Motion: active ROM (range of motion)  encouraged  Intervention: Prevent Infection  Recent Flowsheet Documentation  Taken 5/20/2024 0000 by Jessie Koch RN  Infection Prevention: environmental surveillance performed  Taken 5/19/2024 2200 by Jessie Koch RN  Infection Prevention:   environmental surveillance performed   hand hygiene promoted   rest/sleep promoted   single patient room provided  Taken 5/19/2024 2000 by Jessie Koch RN  Infection Prevention:   environmental surveillance performed   hand hygiene promoted   rest/sleep promoted   single patient room provided  Goal: Optimal Comfort and Wellbeing  Outcome: Ongoing, Progressing  Intervention: Provide Person-Centered Care  Recent Flowsheet Documentation  Taken 5/19/2024 2000 by Jessie Koch RN  Trust Relationship/Rapport:   care explained   choices provided   emotional support provided   empathic listening provided   questions answered   questions encouraged  Goal: Readiness for Transition of Care  Outcome: Ongoing, Progressing     Problem: Hypertension Comorbidity  Goal: Blood Pressure in Desired Range  Outcome: Ongoing, Progressing  Intervention: Maintain Blood Pressure Management  Recent Flowsheet Documentation  Taken 5/19/2024 2000 by Jessie Koch RN  Medication Review/Management: medications reviewed     Problem: Pain Acute  Goal: Acceptable Pain Control and Functional Ability  Outcome: Ongoing, Progressing  Intervention: Prevent or Manage Pain  Recent Flowsheet Documentation  Taken 5/19/2024 2000 by Jessie Koch RN  Medication Review/Management: medications reviewed  Intervention: Optimize Psychosocial Wellbeing  Recent Flowsheet Documentation  Taken 5/19/2024 2000 by Jessie Koch RN  Diversional Activities: television

## 2024-05-20 NOTE — CASE MANAGEMENT/SOCIAL WORK
Continued Stay Note  [facility]     Patient Name: Pipe Watkins  MRN: [id number]  Today's Date: 5/20/2024    Admit Date: 5/18/2024    Plan: home   Discharge Plan       Row Name 05/20/24 1338       Plan    Plan home    Patient/Family in Agreement with Plan yes    Plan Comments Met with Mr. Watkins and his wife at the bedside to discuss discharge plan. His plan is home with his wife, and she will transport. He is hoping to discharge today. No discharge needs were identified.  will continue to follow plan of care and assist with discharge planning needs as indicated.    Final Discharge Disposition Code 01 - home or self-care                   Discharge Codes    No documentation.                       Jia Ramires RN

## 2024-05-20 NOTE — OUTREACH NOTE
Prep Survey      Flowsheet Row Responses   [facility] patient discharged from? [place]   Is LACE score < 7 ? Yes   Eligibility [facility]   Date of Admission 05/18/24   Date of Discharge 05/20/24   Discharge Disposition Home or Self Care   Discharge diagnosis Hypertensive urgency   Does the patient have one of the following disease processes/diagnoses(primary or secondary)? Other   Does the patient have Home health ordered? No   Is there a DME ordered? No   Prep survey completed? Yes            BRANDON JAMES - Registered Nurse

## 2024-05-21 ENCOUNTER — TELEPHONE (OUTPATIENT)
Age: 84
End: 2024-05-21
Payer: MEDICARE

## 2024-05-21 ENCOUNTER — TRANSITIONAL CARE MANAGEMENT TELEPHONE ENCOUNTER (OUTPATIENT)
Dept: CALL CENTER | Facility: HOSPITAL | Age: 84
End: 2024-05-21
Payer: MEDICARE

## 2024-05-21 NOTE — OUTREACH NOTE
Call Center TCM Note      Flowsheet Row Responses   [facility] patient discharged from? [place]   Does the patient have one of the following disease processes/diagnoses(primary or secondary)? Other   TCM attempt successful? No   Unsuccessful attempts Attempt 1  [Numbers listed on verbal release were attempted-no answer]            Chyna Chowdhury RN    5/21/2024, 15:24 EDT

## 2024-05-21 NOTE — TELEPHONE ENCOUNTER
Patient called, requesting call back from Dr. Cedeño. He said that Thursday night he went to the  ER because his blood pressure was 196/106 and then went back and was admitted on Saturday and was discharged yesterday. He said that he thinks this is caused by his anxiety because everything else was okay while he was at the hospital. He said he has a follow up with Dr. Paredes tomorrow, but is requesting to speak with Dr. Cedeño regarding his severe anxiety.

## 2024-05-21 NOTE — OUTREACH NOTE
Call Center TCM Note      Flowsheet Row Responses   [facility] patient discharged from? [place]   Does the patient have one of the following disease processes/diagnoses(primary or secondary)? Other   TCM attempt successful? Yes   Call start time 1536   Call end time 1551   Discharge diagnosis Hypertensive urgency   Meds reviewed with patient/caregiver? Yes   Is the patient having any side effects they believe may be caused by any medication additions or changes? No   Does the patient have all medications ordered at discharge? Yes   Is the patient taking all medications as directed (includes completed medication regime)? Yes   Comments Hosp [facility] on 5/22/24 with PCP   Does the patient have an appointment with their PCP within 7-14 days of discharge? Yes   Did the patient receive a copy of their discharge instructions? Yes   Nursing interventions Reviewed instructions with patient   What is the patient's perception of their health status since discharge? Same  [183/106-pt waiting for return call from Dr. Cedeño's office-pt will seek medical attention if symptoms worsen - pt has a prn BP med]   Is the patient/caregiver able to teach back signs and symptoms related to disease process for when to call PCP? Yes   Is the patient/caregiver able to teach back signs and symptoms related to disease process for when to call 911? Yes   Is the patient/caregiver able to teach back the hierarchy of who to call/visit for symptoms/problems? PCP, Specialist, Home health nurse, Urgent Care, ED, 911 Yes   If the patient is a current smoker, are they able to teach back resources for cessation? Not a smoker   TCM call completed? Yes   Call end time 1551            Chyna Chowdhury RN    5/21/2024, 15:51 EDT

## 2024-05-22 ENCOUNTER — OFFICE VISIT (OUTPATIENT)
Dept: FAMILY MEDICINE CLINIC | Facility: CLINIC | Age: 84
End: 2024-05-22
Payer: MEDICARE

## 2024-05-22 VITALS
OXYGEN SATURATION: 99 % | HEIGHT: 70 IN | BODY MASS INDEX: 28.09 KG/M2 | WEIGHT: 196.2 LBS | HEART RATE: 63 BPM | DIASTOLIC BLOOD PRESSURE: 90 MMHG | SYSTOLIC BLOOD PRESSURE: 162 MMHG

## 2024-05-22 DIAGNOSIS — I10 ESSENTIAL HYPERTENSION: Primary | ICD-10-CM

## 2024-05-22 DIAGNOSIS — I48.0 PAROXYSMAL ATRIAL FIBRILLATION: ICD-10-CM

## 2024-05-22 DIAGNOSIS — F41.9 ANXIETY: ICD-10-CM

## 2024-05-22 RX ORDER — SERTRALINE HYDROCHLORIDE 25 MG/1
TABLET, FILM COATED ORAL
Qty: 90 TABLET | Refills: 0 | Status: SHIPPED | OUTPATIENT
Start: 2024-05-22

## 2024-05-22 RX ORDER — VALSARTAN AND HYDROCHLOROTHIAZIDE 160; 25 MG/1; MG/1
1 TABLET ORAL DAILY
Qty: 90 TABLET | Refills: 2 | Status: SHIPPED | OUTPATIENT
Start: 2024-05-22

## 2024-05-22 RX ORDER — HYDROXYZINE HYDROCHLORIDE 10 MG/1
TABLET, FILM COATED ORAL
Qty: 90 TABLET | Refills: 0 | Status: SHIPPED | OUTPATIENT
Start: 2024-05-22

## 2024-05-22 NOTE — TELEPHONE ENCOUNTER
Called to inform Pt to take L-Theanine once daily. Pt verbalized understanding and did not have any questions at this time.

## 2024-05-22 NOTE — TELEPHONE ENCOUNTER
"Called Pt to inform per provider \"He can increase his zoloft to 75 mg total.  Please make sure he is still following up with his therapist and talking to him/her about coping skills for anxiety.  If he needs to be seen sooner, we can try to make an earlier appointment to follow up with me.  I can call in a low dose of Vistaril, which can  be used as needed for anxiety \"  Pt states he would like to try the low dose of Vistaril for anxiety. Pt states having bad anxiety is causing high blood pressure. Pt states when he was in the hospital they were giving him 0.25MG Ativan through a IV, Pt states it was helping with high blood pressure. Pt \"believes it's his mind causing the high blood pressure.\" Pt is taking L-Theanine supplement provider recommended, Pt states it is helping with high blood pressure. Pt would like to know how many times a day he can take the 200MG of L-Theanine.   "

## 2024-05-22 NOTE — PROGRESS NOTES
Transitional Care Follow Up Visit  Subjective     Pipe Watkins is a 83 y.o. male who presents for a transitional care management visit.    Within 48 business hours after discharge our office contacted him via telephone to coordinate his care and needs.      I reviewed and discussed the details of that call along with the discharge summary, hospital problems, inpatient lab results, inpatient diagnostic studies, and consultation reports with Pipe.     Current outpatient and discharge medications have been reconciled for the patient.  Reviewed by: Teddy Paredes MD          5/20/2024     5:51 PM   Date of TCM Phone Call   [facility]   Date of Admission 5/18/2024   Date of Discharge 5/20/2024   Discharge Disposition Home or Self Care     Risk for Readmission (LACE) Score: 4 (5/20/2024  6:00 AM)      **Hypertensive urgency [I16.0]   Yes     Chest pain [R07.9]   Unknown    Paroxysmal atrial fibrillation [I48.0]   Yes    Sick sinus syndrome [I49.5]   Yes    Hyperlipidemia [E78.5]   Yes    Depression [F32.A]   Yes    Essential hypertension [I10]         History of Present Illness   Course During Hospital Stay:  Pipe Watkins is a 83 y.o. male  with past medical history significant for SVT, sick sinus syndrome s/p PPM, paroxysmal atrial fibrillation on Eliquis, hypertension, hyperlipidemia, anxiety and depression.  Patient was admitted for uncontrolled hypertension and chest pain.        Chest pain  Hypertensive urgency  Hyperlipidemia  -- HS troponin 13  -- Chest x-ray shows no acute process  -- Was given aspirin in the ED  -- Cardiology has seen and evaluated patient.  -- Chest pain is completely resolved  --Patient's blood pressure medications Bystolic and losartan  were increased and blood pressure is better controlled although still not at target.  Blood pressure medication needs further adjustment which we will defer to PCP.  I do long conversation with the patient and his wife at the bedside.  I  "recommended that patient make a log of his blood pressure readings and discuss it with the primary care physician during the first follow-up visit.     PAF  SSS s/p PPM  History of SVT  -- Normal device interrogation on 4/8/2024  -- Continue Eliquis, Bystolic     Elevated lipase  -- Lipase 92  --No abdominal pain.     Generalized anxiety disorder-severe  Depression  -- Follows with [facility]- remains on recent zoloft and prn ativan per psychiatry       Current visit.   Since being home- bp remains up- but better. He now admits he his biggest issue is anxiety. His psychiatrit increased Zoloft to 75 mg daily ( just today)   Has ativan \" for emergency\"  per psychiatry.   Has been on hydrochlorthiazide and higher doses of diovan  The following portions of the patient's history were reviewed and updated as appropriate: He  has a past medical history of Abnormal ECG, Alcohol abuse, Arrhythmia, Arthritis, Depression, Hyperlipidemia, Hypertension, Osteoarthritis, Paroxysmal atrial fibrillation (12/22/2021), Sick sinus syndrome, SVT (supraventricular tachycardia), Wears hearing aid, and Wears prescription eyeglasses.  He does not have any pertinent problems on file.  He  has a past surgical history that includes Knee arthroscopy w/ meniscal repair (Left); Pacemaker Implantation (01/13/2015); Colonoscopy; Cardiac electrophysiology procedure (N/A, 1/18/2017); Tonsillectomy; Ablation of dysrhythmic focus; and Insert / replace / remove pacemaker.  His family history includes Heart attack in his father; Hypertension in his mother.  He  reports that he has never smoked. He has never been exposed to tobacco smoke. He has never used smokeless tobacco. He reports that he does not drink alcohol and does not use drugs.  Current Outpatient Medications   Medication Sig Dispense Refill    apixaban (Eliquis) 5 MG tablet tablet TAKE 1 TABLET BY MOUTH TWICE DAILY 60 tablet 5    Cholecalciferol (VITAMIN D3) 125 MCG (5000 UT) " capsule capsule Take 4,000 Units by mouth Daily.      cloNIDine (CATAPRES) 0.1 MG tablet Take 0.5 tablet by mouth as needed systolic blood pressure greater 160 16 tablet 0    dorzolamide-timolol (COSOPT) 2-0.5 % ophthalmic solution INSTILL 1 DROP IN BOTH EYES EVERY 12 HOURS      folic acid (FOLVITE) 1 MG tablet Take  by mouth Daily.      hydrOXYzine (ATARAX) 10 MG tablet Take 1-2 tablets as needed for anxiety 90 tablet 0    LORazepam (Ativan) 0.5 MG tablet Take 0.5 tablets by mouth As Needed for Anxiety. 5 tablet 0    magnesium oxide (MAG-OX) 400 MG tablet Take 1 tablet by mouth Daily. 90 tablet 3    methotrexate 2.5 MG tablet Take 3 tablets by mouth 1 (One) Time Per Week.      nebivolol (BYSTOLIC) 5 MG tablet Take 1 tablet by mouth Daily. 30 tablet 0    Omega-3 Fatty Acids (fish oil) 1000 MG capsule capsule Take  by mouth Every Night.      sertraline (Zoloft) 25 MG tablet 3 tabs daily 90 tablet 0    Vitamin A 2400 MCG (8000 UT) tablet Take  by mouth.      vitamin B-12 (CYANOCOBALAMIN) 1000 MCG tablet Take 1 tablet by mouth Daily.      vitamin C (ASCORBIC ACID) 250 MG tablet Take 4 tablets by mouth Daily.      vitamin E 100 UNIT capsule Take 4 capsules by mouth Daily.      Zinc 50 MG capsule Take 50 mg by mouth Daily.      valsartan-hydrochlorothiazide (Diovan HCT) 160-25 MG per tablet Take 1 tablet by mouth Daily. 90 tablet 2     No current facility-administered medications for this visit.     Current Outpatient Medications on File Prior to Visit   Medication Sig    apixaban (Eliquis) 5 MG tablet tablet TAKE 1 TABLET BY MOUTH TWICE DAILY    Cholecalciferol (VITAMIN D3) 125 MCG (5000 UT) capsule capsule Take 4,000 Units by mouth Daily.    cloNIDine (CATAPRES) 0.1 MG tablet Take 0.5 tablet by mouth as needed systolic blood pressure greater 160    dorzolamide-timolol (COSOPT) 2-0.5 % ophthalmic solution INSTILL 1 DROP IN BOTH EYES EVERY 12 HOURS    folic acid (FOLVITE) 1 MG tablet Take  by mouth Daily.    LORazepam  (Ativan) 0.5 MG tablet Take 0.5 tablets by mouth As Needed for Anxiety.    magnesium oxide (MAG-OX) 400 MG tablet Take 1 tablet by mouth Daily.    methotrexate 2.5 MG tablet Take 3 tablets by mouth 1 (One) Time Per Week.    nebivolol (BYSTOLIC) 5 MG tablet Take 1 tablet by mouth Daily.    Omega-3 Fatty Acids (fish oil) 1000 MG capsule capsule Take  by mouth Every Night.    Vitamin A 2400 MCG (8000 UT) tablet Take  by mouth.    vitamin B-12 (CYANOCOBALAMIN) 1000 MCG tablet Take 1 tablet by mouth Daily.    vitamin C (ASCORBIC ACID) 250 MG tablet Take 4 tablets by mouth Daily.    vitamin E 100 UNIT capsule Take 4 capsules by mouth Daily.    Zinc 50 MG capsule Take 50 mg by mouth Daily.    [DISCONTINUED] neomycin-polymyxin-hydrocortisone (CORTISPORIN) 1 % solution otic solution Administer 3 drops to the right ear 4 (Four) Times a Day.    [DISCONTINUED] sertraline (Zoloft) 25 MG tablet Start with 1 tablet daily. May increase to two tablets daily after 1-2 weeks    [DISCONTINUED] valsartan (DIOVAN) 80 MG tablet Take 1 tablet by mouth Daily.    [DISCONTINUED] Methotrexate 7.5 MG/0.3ML solution prefilled syringe Inject  under the skin into the appropriate area as directed.     No current facility-administered medications on file prior to visit.     He is allergic to other, ace inhibitors, lipitor [atorvastatin], paroxetine, penicillins, and diltiazem..  Discharge Medications          Changes to Medications         Instructions Start Date   nebivolol 5 MG tablet  Commonly known as: BYSTOLIC  What changed:   medication strength  how much to take    5 mg, Oral, Daily    Start Date: May 21, 2024      valsartan 80 MG tablet  Commonly known as: DIOVAN  What changed:   medication strength  how much to take    80 mg, Oral, Daily    Start Date: May 21, 2024                Continue These Medications         Instructions Start Date   cloNIDine 0.1 MG tablet  Commonly known as: CATAPRES    Take 0.5 tablet by mouth as needed systolic  "blood pressure greater 160        dorzolamide-timolol 2-0.5 % ophthalmic solution  Commonly known as: COSOPT    INSTILL 1 DROP IN BOTH EYES EVERY 12 HOURS        Eliquis 5 MG tablet tablet  Generic drug: apixaban    5 mg, Oral, 2 Times Daily        fish oil 1000 MG capsule capsule    Oral, Nightly        folic acid 1 MG tablet  Commonly known as: FOLVITE    Oral, Every 24 Hours        LORazepam 0.5 MG tablet  Commonly known as: Ativan    0.25 mg, Oral, As Needed        magnesium oxide 400 MG tablet  Commonly known as: MAG-OX    400 mg, Oral, Daily        methotrexate 2.5 MG tablet    7.5 mg, Oral, Weekly        Methotrexate 7.5 MG/0.3ML solution prefilled syringe    Subcutaneous        neomycin-polymyxin-hydrocortisone 1 % solution otic solution  Commonly known as: CORTISPORIN    3 drops, Right Ear, 4 Times Daily        sertraline 25 MG tablet  Commonly known as: Zoloft    Start with 1 tablet daily. May increase to two tablets daily after 1-2 weeks        Vitamin A 2400 MCG (8000 UT) tablet    Oral        vitamin B-12 1000 MCG tablet  Commonly known as: CYANOCOBALAMIN    1,000 mcg, Oral, Daily        vitamin C 250 MG tablet  Commonly known as: ASCORBIC ACID    1,000 mg, Oral, Daily        vitamin D3 125 MCG (5000 UT) capsule capsule    4,000 Units, Oral, Daily        vitamin E 100 UNIT capsule    400 Units, Oral, Daily        Zinc 50 MG capsule    50 mg, Oral, Daily         Review of Systems   Constitutional: Negative.    Eyes: Negative.    Respiratory:  Negative for shortness of breath.    Cardiovascular: Negative.  Negative for chest pain, palpitations and leg swelling.   Gastrointestinal: Negative.    Genitourinary: Negative.    Musculoskeletal: Negative.    Neurological:  Negative for weakness, numbness and headaches.   Psychiatric/Behavioral:  The patient is nervous/anxious.        Objective   /90   Pulse 63   Ht 177.8 cm (70\")   Wt 89 kg (196 lb 3.2 oz)   SpO2 99%   BMI 28.15 kg/m²   Physical " Exam  Vitals and nursing note reviewed.   Constitutional:       General: He is not in acute distress.     Appearance: He is well-developed. He is not diaphoretic.   HENT:      Head: Normocephalic and atraumatic.      Right Ear: External ear normal.      Left Ear: External ear normal.      Mouth/Throat:      Pharynx: No oropharyngeal exudate.   Eyes:      General: No scleral icterus.        Right eye: No discharge.      Conjunctiva/sclera: Conjunctivae normal.   Neck:      Thyroid: No thyromegaly.      Vascular: No JVD.      Trachea: No tracheal deviation.   Cardiovascular:      Rate and Rhythm: Normal rate and regular rhythm.      Heart sounds: Normal heart sounds.      Comments: PMI nondisplaced  Pulmonary:      Effort: Pulmonary effort is normal.      Breath sounds: Normal breath sounds. No wheezing or rales.   Abdominal:      General: Bowel sounds are normal.      Palpations: Abdomen is soft.      Tenderness: There is no abdominal tenderness. There is no guarding or rebound.   Musculoskeletal:      Cervical back: Normal range of motion and neck supple.   Lymphadenopathy:      Cervical: No cervical adenopathy.   Skin:     General: Skin is warm and dry.      Capillary Refill: Capillary refill takes less than 2 seconds.      Coloration: Skin is not pale.      Findings: No rash.   Neurological:      Mental Status: He is alert and oriented to person, place, and time.      Motor: No abnormal muscle tone.      Coordination: Coordination normal.   Psychiatric:         Judgment: Judgment normal.         Assessment & Plan   Diagnoses and all orders for this visit:    1. Essential hypertension (Primary)  -     valsartan-hydrochlorothiazide (Diovan HCT) 160-25 MG per tablet; Take 1 tablet by mouth Daily.  Dispense: 90 tablet; Refill: 2    2. Paroxysmal atrial fibrillation    3. Anxiety    Other orders  -     sertraline (Zoloft) 25 MG tablet; 3 tabs daily  Dispense: 90 tablet; Refill: 0      Will increase Diovan to dovan hct  160-25 once daily. Contiue bystolic 5 mg dailt with prn clonidine if >160-165  Try hydroxyzine per psychiatry.  I think this is too low a dose however we will start at 10 mg.  He does have a 0.25 mg of Ativan for emergencies however I suspect Rogelio will need a longer time.  I am willing to take this over if need be temporarily.  Hopefully once his SSRI kicks in he will be better less dependent upon clonidine and Ativan.  I suspect he will eventually end up on higher dose of Zoloft and hydroxyzine 25-50 3 times daily as needed.  I would like to check a BMP and follow-up in 4 weeks to follow-up blood pressure anxiety and a renal panel

## 2024-05-27 LAB
QT INTERVAL: 424 MS
QTC INTERVAL: 448 MS

## 2024-05-28 ENCOUNTER — TELEPHONE (OUTPATIENT)
Dept: CARDIOLOGY | Facility: CLINIC | Age: 84
End: 2024-05-28
Payer: MEDICARE

## 2024-05-28 DIAGNOSIS — R07.2 PRECORDIAL PAIN: Primary | ICD-10-CM

## 2024-05-28 NOTE — TELEPHONE ENCOUNTER
"  Caller: Pipe Watkins \"Rogelio\"    Relationship: Self    Best call back number: [phone]    What is the best time to reach you: ANY    Who are you requesting to speak with (clinical staff, provider,  specific staff member): ANY      What was the call regarding: PT FEELS LIKE HE NEEDS TO HAVE A ULTRASOUND OF HIS HEART DONE, HE WAS JUST IN THE HOSPITAL AND THEY DID NOT GIVE HIM ONE.    Is it okay if the provider responds through MyChart: NO      "

## 2024-05-29 ENCOUNTER — OFFICE VISIT (OUTPATIENT)
Age: 84
End: 2024-05-29
Payer: MEDICARE

## 2024-05-29 VITALS
OXYGEN SATURATION: 99 % | DIASTOLIC BLOOD PRESSURE: 90 MMHG | HEIGHT: 70 IN | BODY MASS INDEX: 27.87 KG/M2 | WEIGHT: 194.7 LBS | SYSTOLIC BLOOD PRESSURE: 148 MMHG | HEART RATE: 66 BPM

## 2024-05-29 DIAGNOSIS — F33.1 MAJOR DEPRESSIVE DISORDER, RECURRENT EPISODE, MODERATE DEGREE: ICD-10-CM

## 2024-05-29 DIAGNOSIS — F41.1 GENERALIZED ANXIETY DISORDER: Primary | ICD-10-CM

## 2024-05-29 RX ORDER — LORAZEPAM 0.5 MG/1
0.25 TABLET ORAL AS NEEDED
Qty: 10 TABLET | Refills: 0 | Status: SHIPPED | OUTPATIENT
Start: 2024-05-29

## 2024-05-29 RX ORDER — SERTRALINE HYDROCHLORIDE 100 MG/1
100 TABLET, FILM COATED ORAL DAILY
Qty: 30 TABLET | Refills: 1 | Status: SHIPPED | OUTPATIENT
Start: 2024-05-29

## 2024-05-29 NOTE — PROGRESS NOTES
[facility]             Follow Up Office Visit      Date: 2024   Patient Name: Pipe Watkins  : 1940   MRN: [id number]     Referring Provider: Teddy Paredes MD    Chief Complaint:      ICD-10-CM ICD-9-CM   1. Generalized anxiety disorder  F41.1 300.02   2. Major depressive disorder, recurrent episode, moderate degree  F33.1 296.32     History of Present Illness:   Pipe Watkins is a 83 y.o. male who is here today for follow up with CATHERINE.    Patient is seen and evaluated in the office. He appears to be in no acute distress at this time. He is calm and cooperative with evaluation, and exhibits a linear and goal directed thought process. Patient speaks with writer regarding recent visit to ER/hospitalization for elevated BP/chest pain. He was cleared by cardiology and concerns appear to be triggered by patients anxiety. We started Zoloft two weeks ago and have titrated up to 75 mg a day during that time. Writer spoke with patient regarding needing to give this medication more time as it typically takes 4-6 weeks for effect. Patient is agreeable, but would like to increase to 100 mg a day today. Writer is agreeable with this plan. He requests a refill of Ativan today- he was provided 5 0.5 mg tablets and has been taking 1/2 tablet PRN. He does admit that this has been beneficial for him. Writer prescribed patient 10 mg of Hydroxyzine for PRN anxiety. He states that this made him feel like a zombie. He has not been sleeping well, so writer recommended taking this medication at night. BP medications have been adjusted and it has been more controlled for the most part. He denies that he has experienced any suicidal ideation/plan/intent. We will follow up in 2-3 weeks before he goes to [facility] for a few weeks. He will continue with therapy in the meantime.     Previous Medication Trials:  Lexapro, Cymbalta (palpitations), Prozac, Pristiq, Effexor     Subjective       Review of Systems:   Review of Systems   Constitutional:  Negative for chills, fatigue and fever.   HENT:  Negative for congestion, hearing loss, sore throat and trouble swallowing.    Eyes:  Negative for blurred vision and double vision.   Respiratory:  Negative for cough, chest tightness and shortness of breath.    Cardiovascular:  Negative for chest pain and palpitations.   Gastrointestinal:  Negative for abdominal pain, constipation, diarrhea, nausea and vomiting.   Endocrine: Negative for polydipsia and polyuria.   Genitourinary:  Negative for hematuria and urgency.   Musculoskeletal:  Negative for arthralgias.   Skin:  Negative for skin lesions and bruise.   Neurological:  Negative for dizziness, tremors, seizures and light-headedness.   Hematological:  Negative for adenopathy.     Screening Scores:   PHQ-9 : 4  CATHERINE-7 : 9    Medications:     Current Outpatient Medications:     apixaban (Eliquis) 5 MG tablet tablet, TAKE 1 TABLET BY MOUTH TWICE DAILY, Disp: 60 tablet, Rfl: 5    Cholecalciferol (VITAMIN D3) 125 MCG (5000 UT) capsule capsule, Take 4,000 Units by mouth Daily., Disp: , Rfl:     cloNIDine (CATAPRES) 0.1 MG tablet, Take 0.5 tablet by mouth as needed systolic blood pressure greater 160, Disp: 16 tablet, Rfl: 0    dorzolamide-timolol (COSOPT) 2-0.5 % ophthalmic solution, INSTILL 1 DROP IN BOTH EYES EVERY 12 HOURS, Disp: , Rfl:     folic acid (FOLVITE) 1 MG tablet, Take  by mouth Daily., Disp: , Rfl:     hydrOXYzine (ATARAX) 10 MG tablet, Take 1-2 tablets as needed for anxiety, Disp: 90 tablet, Rfl: 0    LORazepam (Ativan) 0.5 MG tablet, Take 0.5 tablets by mouth As Needed for Anxiety., Disp: 10 tablet, Rfl: 0    magnesium oxide (MAG-OX) 400 MG tablet, Take 1 tablet by mouth Daily., Disp: 90 tablet, Rfl: 3    methotrexate 2.5 MG tablet, Take 3 tablets by mouth 1 (One) Time Per Week., Disp: , Rfl:     nebivolol (BYSTOLIC) 5 MG tablet, Take 1 tablet by mouth Daily., Disp: 30 tablet, Rfl: 0    Omega-3  "Fatty Acids (fish oil) 1000 MG capsule capsule, Take  by mouth Every Night., Disp: , Rfl:     valsartan-hydrochlorothiazide (Diovan HCT) 160-25 MG per tablet, Take 1 tablet by mouth Daily. (Patient taking differently: Take 1 tablet by mouth Daily. Pt taking 160  mg Valsartan only), Disp: 90 tablet, Rfl: 2    vitamin B-12 (CYANOCOBALAMIN) 1000 MCG tablet, Take 1 tablet by mouth Daily., Disp: , Rfl:     vitamin C (ASCORBIC ACID) 250 MG tablet, Take 4 tablets by mouth Daily., Disp: , Rfl:     vitamin E 100 UNIT capsule, Take 4 capsules by mouth Daily., Disp: , Rfl:     Zinc 50 MG capsule, Take 50 mg by mouth Daily., Disp: , Rfl:     sertraline (Zoloft) 100 MG tablet, Take 1 tablet by mouth Daily., Disp: 30 tablet, Rfl: 1    Medication Considerations:  ARMANDO reviewed and appropriate.     Allergies:   Allergies   Allergen Reactions    Other Cough    Ace Inhibitors      Cough     Lipitor [Atorvastatin]      Severe depression    Paroxetine Other (See Comments)    Penicillins Hives    Diltiazem Unknown (See Comments)     Objective     Vital Signs:   Vitals:    05/29/24 1142   BP: 148/90   Pulse: 66   SpO2: 99%   Weight: 88.3 kg (194 lb 11.2 oz)   Height: 177.8 cm (70\")       Body mass index is 27.94 kg/m².     Mental Status Exam:   MENTAL STATUS EXAM   General Appearance:  Cleanly groomed and dressed  Eye Contact:  Good eye contact  Attitude:  Cooperative  Motor Activity:  Normal gait, posture  Muscle Strength:  Normal  Speech:  Normal rate, tone, volume  Language:  Spontaneous  Mood and affect:  Normal, pleasant and appropriate  Hopelessness:  Denies  Thought Process:  Logical and goal-directed  Associations/ Thought Content:  No delusions  Hallucinations:  None  Suicidal Ideations:  Not present  Homicidal Ideation:  Not present  Sensorium:  Alert  Orientation:  Person, place, time and situation  Immediate Recall, Recent, and Remote Memory:  Intact  Attention Span/ Concentration:  Good  Fund of Knowledge:  Appropriate for " age and educational level  Intellectual Functioning:  Average range  Insight:  Fair  Judgement:  Fair  Reliability:  Fair  Impulse Control:  Fair      SUICIDE RISK ASSESSMENT/CSSRS:  1. Does patient have thoughts of suicide? denies  2. Does patient have intent for suicide? denies  3. Does patient have a current plan for suicide? denies  4. History of suicide attempts: denies  5. Family history of suicide or attempts: denies  6. History of violent behaviors towards others or property or thoughts of committing suicide: denies  7. History of sexual aggression toward others: denies  8. Access to firearms or weapons: denies    Assessment / Plan      Visit Diagnosis/Orders Placed This Visit:  Diagnoses and all orders for this visit:    Generalized anxiety disorder (Primary)  Adjustment Disorder with depressed mood  - Continue L-Theanine   - Provided 10 tablets 0.5 mg Ativan; patient advised to take 1/2 tablet PRN panic attack if mindfulness/music are ineffective  - Increase Zoloft to 100 mg po daily  - Try taking Hydroxyzine 10 mg po qpm to help with anxiety at night/sleep  - Continue therapy   - Follow up with writer in 2-4 weeks  Labs Reviewed : labs from 6/15/23 reviewed  Chart Reviewed      Functional Status: Mild impairment      Prognosis: Fair with Ongoing Treatment     Short-term goals: Patient will adhere to medication regimen and experience continued improvement in symptoms over the next 3 months.   Long-term goals: Patient will adhere to medication treatment plan and report improvement in symptoms over the next 6 months    Impression/Formulation:  Patient appeared alert and oriented. Patient is receptive to assistance with maintaining a stable lifestyle.  Patient presents with history of     ICD-10-CM ICD-9-CM   1. Generalized anxiety disorder  F41.1 300.02   2. Major depressive disorder, recurrent episode, moderate degree  F33.1 296.32     Treatment Plan:     Patient will continue supportive psychotherapy  efforts and medications as indicated. Clinic will obtain release of information for current treatment team for continuity of care as needed. Patient will contact this office, call 911 or present to the nearest emergency room should suicidal or homicidal ideations occur.  Discussed medication options and treatment plan of prescribed medication(s) as well as the risks, benefits, and potential side effects. Patient ackowledged and verbally consented to continue with current treatment plan and was educated on the importance of compliance with treatment and follow-up appointments.     Quality Measures:  Tobacco: Pipe Watkins  reports that he has never smoked. He has never been exposed to tobacco smoke. He has never used smokeless tobacco. I have educated him on the risk of diseases from using tobacco products such as cancer, COPD, and heart disease.       Follow Up:   Return in about 2 weeks (around 6/12/2024) for Medication Management, follow up with therapy.    Chyna Cedeño MD  [facility]     This is electronically signed by Chyna Cedeño MD   05/14/2024 09:10 EDT

## 2024-05-31 ENCOUNTER — TELEPHONE (OUTPATIENT)
Age: 84
End: 2024-05-31
Payer: MEDICARE

## 2024-05-31 NOTE — TELEPHONE ENCOUNTER
Rogelio called and said that he feels he may be taking more medicine that what is needed to improve his brain fog.  He feels like he can't get back to feeling normal.  He also stated that he was advised that it takes time for him to receive the full benefit from medications.  He also said that he paces all day just to stay awake, which he feels should be unnecessary.  Please advise and then call him back to discuss.

## 2024-06-03 ENCOUNTER — HOSPITAL ENCOUNTER (OUTPATIENT)
Dept: CARDIOLOGY | Facility: HOSPITAL | Age: 84
Discharge: HOME | End: 2024-06-03
Admitting: INTERNAL MEDICINE
Payer: MEDICARE

## 2024-06-03 VITALS — BODY MASS INDEX: 27.77 KG/M2 | WEIGHT: 194 LBS | HEIGHT: 70 IN

## 2024-06-03 DIAGNOSIS — R07.2 PRECORDIAL PAIN: ICD-10-CM

## 2024-06-03 LAB
BH CV ECHO MEAS - AO MAX PG: 8.1 MMHG
BH CV ECHO MEAS - AO MEAN PG: 4 MMHG
BH CV ECHO MEAS - AO ROOT DIAM: 3.8 CM
BH CV ECHO MEAS - AO V2 MAX: 142 CM/SEC
BH CV ECHO MEAS - AO V2 VTI: 29.5 CM
BH CV ECHO MEAS - AVA(I,D): 1.51 CM2
BH CV ECHO MEAS - EDV(CUBED): 132.7 ML
BH CV ECHO MEAS - EDV(MOD-SP2): 72.7 ML
BH CV ECHO MEAS - EDV(MOD-SP4): 79.5 ML
BH CV ECHO MEAS - EF(MOD-BP): 42.5 %
BH CV ECHO MEAS - EF(MOD-SP2): 46.2 %
BH CV ECHO MEAS - EF(MOD-SP4): 40.4 %
BH CV ECHO MEAS - ESV(CUBED): 66.4 ML
BH CV ECHO MEAS - ESV(MOD-SP2): 39.1 ML
BH CV ECHO MEAS - ESV(MOD-SP4): 47.4 ML
BH CV ECHO MEAS - FS: 20.6 %
BH CV ECHO MEAS - IVS/LVPW: 0.91 CM
BH CV ECHO MEAS - IVSD: 1 CM
BH CV ECHO MEAS - LA DIMENSION: 3.5 CM
BH CV ECHO MEAS - LAT PEAK E' VEL: 5.1 CM/SEC
BH CV ECHO MEAS - LV DIASTOLIC VOL/BSA (35-75): 38.6 CM2
BH CV ECHO MEAS - LV MASS(C)D: 200.8 GRAMS
BH CV ECHO MEAS - LV MAX PG: 2.37 MMHG
BH CV ECHO MEAS - LV MEAN PG: 1 MMHG
BH CV ECHO MEAS - LV SYSTOLIC VOL/BSA (12-30): 23 CM2
BH CV ECHO MEAS - LV V1 MAX: 77 CM/SEC
BH CV ECHO MEAS - LV V1 VTI: 14.2 CM
BH CV ECHO MEAS - LVIDD: 5.1 CM
BH CV ECHO MEAS - LVIDS: 4.1 CM
BH CV ECHO MEAS - LVOT AREA: 3.1 CM2
BH CV ECHO MEAS - LVOT DIAM: 2 CM
BH CV ECHO MEAS - LVPWD: 1.1 CM
BH CV ECHO MEAS - MED PEAK E' VEL: 4 CM/SEC
BH CV ECHO MEAS - MV A MAX VEL: 70.2 CM/SEC
BH CV ECHO MEAS - MV DEC SLOPE: 126 CM/SEC2
BH CV ECHO MEAS - MV DEC TIME: 0.36 SEC
BH CV ECHO MEAS - MV E MAX VEL: 29.9 CM/SEC
BH CV ECHO MEAS - MV E/A: 0.43
BH CV ECHO MEAS - MV MAX PG: 2.5 MMHG
BH CV ECHO MEAS - MV MEAN PG: 1 MMHG
BH CV ECHO MEAS - MV P1/2T: 86.2 MSEC
BH CV ECHO MEAS - MV V2 VTI: 16.2 CM
BH CV ECHO MEAS - MVA(P1/2T): 2.6 CM2
BH CV ECHO MEAS - MVA(VTI): 2.8 CM2
BH CV ECHO MEAS - PA ACC TIME: 0.15 SEC
BH CV ECHO MEAS - RAP SYSTOLE: 3 MMHG
BH CV ECHO MEAS - RVSP: 22 MMHG
BH CV ECHO MEAS - SV(LVOT): 44.6 ML
BH CV ECHO MEAS - SV(MOD-SP2): 33.6 ML
BH CV ECHO MEAS - SV(MOD-SP4): 32.1 ML
BH CV ECHO MEAS - SVI(LVOT): 21.7 ML/M2
BH CV ECHO MEAS - SVI(MOD-SP2): 16.3 ML/M2
BH CV ECHO MEAS - SVI(MOD-SP4): 15.6 ML/M2
BH CV ECHO MEAS - TAPSE (>1.6): 2.06 CM
BH CV ECHO MEAS - TR MAX PG: 18.5 MMHG
BH CV ECHO MEAS - TR MAX VEL: 215.2 CM/SEC
BH CV ECHO MEASUREMENTS AVERAGE E/E' RATIO: 6.57
BH CV VAS BP LEFT ARM: NORMAL MMHG
BH CV XLRA - RV BASE: 3.7 CM
BH CV XLRA - RV LENGTH: 6.7 CM
BH CV XLRA - RV MID: 2.5 CM
BH CV XLRA - TDI S': 8.2 CM/SEC
LEFT ATRIUM VOLUME INDEX: 23.7 ML/M2

## 2024-06-03 PROCEDURE — 93306 TTE W/DOPPLER COMPLETE: CPT | Performed by: INTERNAL MEDICINE

## 2024-06-03 PROCEDURE — 93306 TTE W/DOPPLER COMPLETE: CPT

## 2024-06-03 NOTE — TELEPHONE ENCOUNTER
If he is taking the medication in the morning, try switching to night if it is making him feel tired. I would recommend that he continue with medications as prescribed, taking the ativan ONLY as needed. Like he said, he will need to give it time.

## 2024-06-03 NOTE — TELEPHONE ENCOUNTER
"Called PT to relay  providers message;    \"The main thing is that he does not need to continue switching doses. If he wants to go back down tot 50 mg a day that's fine, but he needs to stay at this dose. He should not go back and forth.\"    PT verbalized understanding and stated when he took the 50 mg Zoloft he was experimenting trying to find out if his Zoloft or blood pressure was making him lethargic as PT stated Zoloft has a half life of around 26 hours and his blood pressure medicine has a half life around 6-9 hours. PT stated when he took the 50 mg he had the best Saturday night that he's had in a while. PT stated he had an Echocardiogram today and his BP was 196/106, PT attributed that to his anxiety increasing to worry about heart issues even though this was a routine examine. PT stated he will continue to take 100 mg zoloft Rx daily.   "

## 2024-06-03 NOTE — TELEPHONE ENCOUNTER
The main thing is that he does not need to continue switching doses. If he wants to go back down tot 50 mg a day that's fine, but he needs to stay at this dose. He should not go back and forth.

## 2024-06-03 NOTE — TELEPHONE ENCOUNTER
"Called Pt back regarding last message regarding \"that he feels he may be taking more medicine that what is needed to improve his brain fog\"   Informed Pt of providers message \"if he is taking the medication in the morning, try switching to night if it is making him feel tired. I would recommend that he continue with medications as prescribed, taking the ativan ONLY as needed. Like he said, he will need to give it time.\"    Pt stated he is not taking Ativan as he does not like the way it makes him feel. He recalls Ativan makes him feel \"lethargic.\" Pt stated he realized wrong about taking too much medication to improve his brain fog. He stated he took a lower dose of 50MG of Zoloft and he felt great for the day, but when he got home BP was very high. He increased Zoloft to 100MG and blood pressure is normal now. Zoloft if helping with anxiety to lower BP. Pt states he has to take hydrOXYzine (ATARAX) 10 MG at night to help with sleep, but states when it wears off he does not feel right.       "

## 2024-06-05 ENCOUNTER — TELEPHONE (OUTPATIENT)
Dept: CARDIOLOGY | Facility: CLINIC | Age: 84
End: 2024-06-05
Payer: MEDICARE

## 2024-06-05 RX ORDER — VALSARTAN 160 MG/1
160 TABLET ORAL DAILY
Qty: 90 TABLET | Refills: 0 | Status: SHIPPED | OUTPATIENT
Start: 2024-06-05

## 2024-06-05 NOTE — TELEPHONE ENCOUNTER
----- Message from Parish Craft sent at 6/4/2024  4:19 PM EDT -----  Strength of heart muscle is a little lower than it should be, needs follow-up to adjust medical therapy.

## 2024-06-05 NOTE — TELEPHONE ENCOUNTER
Requesting Valsartan 160 mg daily be sent to his pharmacy. PCP took him off the Valsartan-Hctz because of the diuretic effect.Discussed with  in clinic. Per  sent in Valsartan 160 mg daily.

## 2024-06-05 NOTE — TELEPHONE ENCOUNTER
"  Caller: Pipe Watkins \"Rogelio\"    Relationship: Self    Best call back number: [phone]    What is the best time to reach you: ANY    Who are you requesting to speak with (clinical staff, provider,  specific staff member): ANY      What was the call regarding: PATIENT CALLED TO ADVISE THAT DR PAULSON IS WANTING TO CHANGE HIS MEDICATION, BUT IS WANTING TO SEE HIM IN THE OFFICE FIRST. PATIENT STATES SOMEONE FROM THE OFFICE IS SUPPOSED TO CALL AND SCHEDULE HIS APPT. AND HE IS INQUIRING IF IT WILL BE THIS WEEK, BECAUSE HE WILL NEED A REFILL IF IT IS NOT. PLEASE CONTACT PATIENT AND ADVISE ON THIS ISSUE.    "

## 2024-06-11 ENCOUNTER — OFFICE VISIT (OUTPATIENT)
Age: 84
End: 2024-06-11
Payer: MEDICARE

## 2024-06-11 ENCOUNTER — OFFICE VISIT (OUTPATIENT)
Dept: CARDIOLOGY | Facility: CLINIC | Age: 84
End: 2024-06-11
Payer: MEDICARE

## 2024-06-11 VITALS
SYSTOLIC BLOOD PRESSURE: 132 MMHG | OXYGEN SATURATION: 98 % | BODY MASS INDEX: 27.43 KG/M2 | DIASTOLIC BLOOD PRESSURE: 90 MMHG | WEIGHT: 191.6 LBS | HEART RATE: 80 BPM | HEIGHT: 70 IN

## 2024-06-11 VITALS
BODY MASS INDEX: 27.63 KG/M2 | HEIGHT: 70 IN | DIASTOLIC BLOOD PRESSURE: 88 MMHG | WEIGHT: 193 LBS | HEART RATE: 76 BPM | OXYGEN SATURATION: 96 % | SYSTOLIC BLOOD PRESSURE: 138 MMHG

## 2024-06-11 DIAGNOSIS — I10 ESSENTIAL HYPERTENSION: ICD-10-CM

## 2024-06-11 DIAGNOSIS — I47.19 AVNRT (AV NODAL RE-ENTRY TACHYCARDIA): Primary | ICD-10-CM

## 2024-06-11 DIAGNOSIS — F33.1 MAJOR DEPRESSIVE DISORDER, RECURRENT EPISODE, MODERATE DEGREE: Primary | ICD-10-CM

## 2024-06-11 DIAGNOSIS — I49.5 SICK SINUS SYNDROME: ICD-10-CM

## 2024-06-11 DIAGNOSIS — F41.1 GENERALIZED ANXIETY DISORDER: ICD-10-CM

## 2024-06-11 PROCEDURE — 3075F SYST BP GE 130 - 139MM HG: CPT | Performed by: STUDENT IN AN ORGANIZED HEALTH CARE EDUCATION/TRAINING PROGRAM

## 2024-06-11 PROCEDURE — 3080F DIAST BP >= 90 MM HG: CPT | Performed by: STUDENT IN AN ORGANIZED HEALTH CARE EDUCATION/TRAINING PROGRAM

## 2024-06-11 PROCEDURE — 1159F MED LIST DOCD IN RCRD: CPT | Performed by: STUDENT IN AN ORGANIZED HEALTH CARE EDUCATION/TRAINING PROGRAM

## 2024-06-11 PROCEDURE — 99214 OFFICE O/P EST MOD 30 MIN: CPT | Performed by: STUDENT IN AN ORGANIZED HEALTH CARE EDUCATION/TRAINING PROGRAM

## 2024-06-11 PROCEDURE — 1160F RVW MEDS BY RX/DR IN RCRD: CPT | Performed by: STUDENT IN AN ORGANIZED HEALTH CARE EDUCATION/TRAINING PROGRAM

## 2024-06-11 RX ORDER — BREXPIPRAZOLE 0.5 MG/1
0.5 TABLET ORAL DAILY
Qty: 30 TABLET | Refills: 0 | Status: SHIPPED | OUTPATIENT
Start: 2024-06-11 | End: 2024-06-14

## 2024-06-11 RX ORDER — VALSARTAN 160 MG/1
80 TABLET ORAL DAILY
Start: 2024-06-11

## 2024-06-11 RX ORDER — BREXPIPRAZOLE 0.5 MG/1
0.5 TABLET ORAL DAILY
Qty: 14 TABLET | Refills: 0 | COMMUNITY
Start: 2024-06-11 | End: 2024-06-11
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
## 2024-06-11 NOTE — PROGRESS NOTES
[facility]             Follow Up Office Visit      Date: 2024   Patient Name: Pipe Watkins  : 1940   MRN: [id number]     Referring Provider: Teddy Paredes MD    Chief Complaint:      ICD-10-CM ICD-9-CM   1. Major depressive disorder, recurrent episode, moderate degree  F33.1 296.32   2. Generalized anxiety disorder  F41.1 300.02     History of Present Illness:   Pipe Watkins is a 83 y.o. male who is here today for follow up with MDD/CATHERINE.    Patient is seen and evaluated in the office with his wife present. He appears to be anxious and dysphoric and is tearful. He is cooperative with evaluation, and exhibits a linear and goal directed thought process.  Patient continues to struggle with anxiety and depression.  He has been struggling to do things that he enjoys.  He feels very fatigued throughout the day.  He is still struggling with blood pressure issues; more recently blood pressure has been lower.  He is experiencing a lot of diarrhea that started when he started the Zoloft.  He states that he has experienced weight loss with Zoloft.  He is not taking hydroxyzine or Ativan because he did not like the way that they are making him feel.  He feels as though Zoloft is causing a lot of his symptoms.  We will titrate off of this.  He is not been taking 100 mg long, so we will decrease to 50 mg for about a week and then stop.  We will start Rexulti 0.25 mg and then increase to 0.5 mg as tolerated/desired.  Rexulti is typically associated with less side effects, which is something patient has been struggling with with medications.  He continues to deny suicidal ideation, plan, intent.  We will follow-up in 2 weeks to see if he is feeling better.    Previous Medication Trials:  Lexapro, Cymbalta (palpitations), Prozac, Pristiq, Effexor, Zoloft    Subjective      Review of Systems:   Review of Systems   Constitutional:  Negative for chills, fatigue and  fever.   HENT:  Negative for congestion, hearing loss, sore throat and trouble swallowing.    Eyes:  Negative for blurred vision and double vision.   Respiratory:  Negative for cough, chest tightness and shortness of breath.    Cardiovascular:  Negative for chest pain and palpitations.   Gastrointestinal:  Negative for abdominal pain, constipation, diarrhea, nausea and vomiting.   Endocrine: Negative for polydipsia and polyuria.   Genitourinary:  Negative for hematuria and urgency.   Musculoskeletal:  Negative for arthralgias.   Skin:  Negative for skin lesions and bruise.   Neurological:  Negative for dizziness, tremors, seizures and light-headedness.   Hematological:  Negative for adenopathy.     Screening Scores:   PHQ-9 : 6  CATHERINE-7 : 3    Medications:     Current Outpatient Medications:     apixaban (Eliquis) 5 MG tablet tablet, TAKE 1 TABLET BY MOUTH TWICE DAILY, Disp: 60 tablet, Rfl: 5    Cholecalciferol (VITAMIN D3) 125 MCG (5000 UT) capsule capsule, Take 4,000 Units by mouth Daily., Disp: , Rfl:     cloNIDine (CATAPRES) 0.1 MG tablet, Take 0.5 tablet by mouth as needed systolic blood pressure greater 160, Disp: 16 tablet, Rfl: 0    dorzolamide-timolol (COSOPT) 2-0.5 % ophthalmic solution, INSTILL 1 DROP IN BOTH EYES EVERY 12 HOURS, Disp: , Rfl:     folic acid (FOLVITE) 1 MG tablet, Take  by mouth Daily., Disp: , Rfl:     magnesium oxide (MAG-OX) 400 MG tablet, Take 1 tablet by mouth Daily., Disp: 90 tablet, Rfl: 3    methotrexate 2.5 MG tablet, Take 3 tablets by mouth 1 (One) Time Per Week., Disp: , Rfl:     nebivolol (BYSTOLIC) 5 MG tablet, Take 1 tablet by mouth Daily., Disp: 30 tablet, Rfl: 0    Omega-3 Fatty Acids (fish oil) 1000 MG capsule capsule, Take  by mouth Every Night., Disp: , Rfl:     valsartan (DIOVAN) 160 MG tablet, Take 1 tablet by mouth Daily., Disp: 90 tablet, Rfl: 0    vitamin B-12 (CYANOCOBALAMIN) 1000 MCG tablet, Take 1 tablet by mouth Daily., Disp: , Rfl:     vitamin C (ASCORBIC ACID)  "250 MG tablet, Take 4 tablets by mouth Daily., Disp: , Rfl:     vitamin E 100 UNIT capsule, Take 4 capsules by mouth Daily., Disp: , Rfl:     Zinc 50 MG capsule, Take 50 mg by mouth Daily., Disp: , Rfl:     Brexpiprazole (Rexulti) 0.5 MG tablet, Take 0.5 mg by mouth Daily., Disp: 30 tablet, Rfl: 0    Brexpiprazole (Rexulti) 0.5 MG tablet, Take 0.5 mg by mouth Daily., Disp: 14 tablet, Rfl: 0    Medication Considerations:  ARMANDO reviewed and appropriate.     Allergies:   Allergies   Allergen Reactions    Other Cough    Ace Inhibitors      Cough     Lipitor [Atorvastatin]      Severe depression    Paroxetine Other (See Comments)    Penicillins Hives    Diltiazem Unknown (See Comments)     Objective     Vital Signs:   Vitals:    06/11/24 0911   BP: 132/90   Pulse: 80   SpO2: 98%   Weight: 86.9 kg (191 lb 9.6 oz)   Height: 177.8 cm (70\")       Body mass index is 27.49 kg/m².     Mental Status Exam:   MENTAL STATUS EXAM   General Appearance:  Cleanly groomed and dressed  Eye Contact:  Good eye contact  Attitude:  Cooperative  Motor Activity:  Normal gait, posture  Muscle Strength:  Normal  Speech:  Normal rate, tone, volume  Language:  Spontaneous  Mood and affect:  Depressed and anxious  Hopelessness:  5  Thought Process:  Logical and goal-directed  Associations/ Thought Content:  No delusions  Hallucinations:  None  Suicidal Ideations:  Not present  Homicidal Ideation:  Not present  Sensorium:  Alert  Orientation:  Person, place, time and situation  Immediate Recall, Recent, and Remote Memory:  Intact  Attention Span/ Concentration:  Good  Fund of Knowledge:  Appropriate for age and educational level  Intellectual Functioning:  Average range  Insight:  Fair  Judgement:  Fair  Reliability:  Fair  Impulse Control:  Fair      SUICIDE RISK ASSESSMENT/CSSRS:  1. Does patient have thoughts of suicide? denies  2. Does patient have intent for suicide? denies  3. Does patient have a current plan for suicide? denies  4. History " of suicide attempts: denies  5. Family history of suicide or attempts: denies  6. History of violent behaviors towards others or property or thoughts of committing suicide: denies  7. History of sexual aggression toward others: denies  8. Access to firearms or weapons: denies    Assessment / Plan      Visit Diagnosis/Orders Placed This Visit:  Diagnoses and all orders for this visit:    Generalized anxiety disorder (Primary)  Adjustment Disorder with depressed mood  - Continue L-Theanine   - DC Ativan  - Decrease Zoloft to 50 mg po daily and DC as tolerated  - Start Rexulti 0.25 mg po daily and increase to 0.5 mg po daily as tolerated  - Continue Hydroxyzine 10 mg po qpm to help with anxiety at night/sleep  - Continue therapy   - Follow up with writer in 2-4 weeks  Labs Reviewed : labs from 6/15/23 reviewed  Chart Reviewed      Functional Status: Mild impairment      Prognosis: Fair with Ongoing Treatment     Short-term goals: Patient will adhere to medication regimen and experience continued improvement in symptoms over the next 3 months.   Long-term goals: Patient will adhere to medication treatment plan and report improvement in symptoms over the next 6 months    Impression/Formulation:  Patient appeared alert and oriented. Patient is receptive to assistance with maintaining a stable lifestyle.  Patient presents with history of     ICD-10-CM ICD-9-CM   1. Major depressive disorder, recurrent episode, moderate degree  F33.1 296.32   2. Generalized anxiety disorder  F41.1 300.02     Treatment Plan:     Patient will continue supportive psychotherapy efforts and medications as indicated. Clinic will obtain release of information for current treatment team for continuity of care as needed. Patient will contact this office, call 911 or present to the nearest emergency room should suicidal or homicidal ideations occur.  Discussed medication options and treatment plan of prescribed medication(s) as well as the risks,  benefits, and potential side effects. Patient ackowledged and verbally consented to continue with current treatment plan and was educated on the importance of compliance with treatment and follow-up appointments.     Quality Measures:  Tobacco: Pipe Watkins  reports that he has never smoked. He has never been exposed to tobacco smoke. He has never used smokeless tobacco. I have educated him on the risk of diseases from using tobacco products such as cancer, COPD, and heart disease.       Follow Up:   Return in about 2 weeks (around 6/25/2024) for Medication Management, follow up with therapy.    Chyna Cedeño MD  [facility]     This is electronically signed by Chyna Cedeño MD   05/14/2024 09:10 EDT

## 2024-06-12 ENCOUNTER — NURSE TRIAGE (OUTPATIENT)
Dept: CALL CENTER | Facility: HOSPITAL | Age: 84
End: 2024-06-12
Payer: MEDICARE

## 2024-06-13 ENCOUNTER — TELEPHONE (OUTPATIENT)
Age: 84
End: 2024-06-13
Payer: MEDICARE

## 2024-06-13 NOTE — TELEPHONE ENCOUNTER
PT CALLED STATING THAT AFTER TAKING THE REXULTI YESTERDAY, HIS BLOOD PRESSURE WENT DOWN /60'S, THEN IN THE AFTERNOON IT WAS IN THE 90'S/50'S.  HE'S CONCERNED WHAT HE SHOULD DO AND IS ASKING TO SPEAK DIRECTLY TO DR. GANDARA.  PLEASE ADVISE

## 2024-06-13 NOTE — TELEPHONE ENCOUNTER
Called PT to relay the providers message:     I do not think it is from the medication as he has been experiencing fluctuations in blood pressure. I would recommend continuing it for now. I would also recommend him considering being evaluated for [facility] voluntary inpatient unit to work on anxiety while he is in a medical setting. This way they can find a medication quickly and monitor for side effects.     PT stated he saw his cardiologist last Tuesday who informed him his BP medication was too high. PT stated his BP today was 110/65 and his cardiologist states his BP target should be 130/80. Pt states he contributed high BP to Zoloft. PT stated Rexulti has a 90 hr half life meaning even he were to stop Rexulti today he would be dealing with adverse se(low BP) for at least two more days. PT states he feels endangered at this time and can't continue this medication for his family's sake. PT stated initially he was informed by Dr. Cedeño that Rexulti has little to no side effects. Pt stated he looked up SE and saw were one potential se of Rexulti is severe low BP.   Pt voiced frustration regarding not being able to speak to the provider directly, I informed PT that our providers are busy in clinic with Pts all day. PT had no further questions at this time. PT was vrescheduled for a f/u tomorrow at 10:30 am with Dr. Cedeño.

## 2024-06-13 NOTE — TELEPHONE ENCOUNTER
"Patient called regarding low blood pressure. States BP's have been running 91/50 to 108/59. HR running in 70s. States his SBP was normally in 130s. Has PM. Denies Dizziness, lightheadedness or weakness. Patient was seen by Cardiologist yesterday and was told to reduce his Valsartan from 160mg to 80mg. Patient states he took 120 mg Valsartan today in order to \"wean down \"from medication\". Patient concerned the Rexulti he has been newly prescribed is causing his low BP. Reports taking less than the prescribed dose of Rexulti today. Advised patient to follow up with Cardiologist regarding BP medications, reviewed taking medication as prescribed by provider. Advised to follow up with Psychiatrist regarding Rexulti, reviewed side effects. Advised of when to go to ER for hypotension, symptoms. Instructed to call [facility] back with any further questions or concerns. Verbalizes understanding.         Reason for Disposition   [1] Systolic BP  AND [2] taking blood pressure medications AND [3] NOT dizzy, lightheaded or weak    Additional Information   Negative: Started suddenly after an allergic medicine, an allergic food, or bee sting   Negative: Shock suspected (e.g., cold/pale/clammy skin, too weak to stand, low BP, rapid pulse)   Negative: Difficult to awaken or acting confused (e.g., disoriented, slurred speech)   Negative: Fainted   Negative: [1] Systolic BP < 90 AND [2] dizzy, lightheaded, or weak   Negative: Chest pain   Negative: Bleeding (e.g., vomiting blood, rectal bleeding or tarry stools, severe vaginal bleeding)(Exception: Fainted from sight of small amount of blood; small cut or abrasion.)   Negative: Extra heartbeats, irregular heart beating, or heart is beating very fast  (i.e., \"palpitations\")   Negative: Sounds like a life-threatening emergency to the triager   Negative: [1] Systolic BP < 80 AND [2] NOT dizzy, lightheaded or weak   Negative: Abdominal pain   Negative: Fever > 100.4 F (38.0 C)   Negative: " "Major surgery in the past month   Negative: [1] Drinking very little AND [2] dehydration suspected (e.g., no urine > 12 hours, very dry mouth, very lightheaded)   Negative: [1] Fall in systolic BP > 20 mm Hg from normal AND [2] dizzy, lightheaded, or weak   Negative: Patient sounds very sick or weak to the triager   Negative: [1] Systolic BP < 90 AND [2] NOT dizzy, lightheaded or weak   Negative: [1] Systolic BP  AND [2] taking blood pressure medications AND [3] dizzy, lightheaded or weak    Answer Assessment - Initial Assessment Questions  1. BLOOD PRESSURE: \"What is the blood pressure?\" \"Did you take at least two measurements 5 minutes apart?\"      *No Answer*  2. ONSET: \"When did you take your blood pressure?\"      Prior to call   3. HOW: \"How did you obtain the blood pressure?\" (e.g., visiting nurse, automatic home BP monitor)      *No Answer*  4. HISTORY: \"Do you have a history of low blood pressure?\" \"What is your blood pressure normally?\"      High BP, but adjusting dosages  5. MEDICINES: \"Are you taking any medications for blood pressure?\" If Yes, ask: \"Have they been changed recently?\"      Valsartan - has been reduced by Cardiologist   6. PULSE RATE: \"Do you know what your pulse rate is?\"       *No Answer*  7. OTHER SYMPTOMS: \"Have you been sick recently?\" \"Have you had a recent injury?\"      no  8. PREGNANCY: \"Is there any chance you are pregnant?\" \"When was your last menstrual period?\"      N/a    Protocols used: Blood Pressure - Low-ADULT-AH    "

## 2024-06-14 ENCOUNTER — OFFICE VISIT (OUTPATIENT)
Age: 84
End: 2024-06-14
Payer: MEDICARE

## 2024-06-14 VITALS
WEIGHT: 192.9 LBS | BODY MASS INDEX: 27.62 KG/M2 | HEART RATE: 61 BPM | HEIGHT: 70 IN | OXYGEN SATURATION: 98 % | SYSTOLIC BLOOD PRESSURE: 128 MMHG | DIASTOLIC BLOOD PRESSURE: 82 MMHG

## 2024-06-14 DIAGNOSIS — F41.1 GENERALIZED ANXIETY DISORDER: ICD-10-CM

## 2024-06-14 DIAGNOSIS — F33.1 MAJOR DEPRESSIVE DISORDER, RECURRENT EPISODE, MODERATE DEGREE: Primary | ICD-10-CM

## 2024-07-01 ENCOUNTER — PATIENT MESSAGE (OUTPATIENT)
Dept: CARDIOLOGY | Facility: CLINIC | Age: 84
End: 2024-07-01
Payer: MEDICARE

## 2024-07-02 RX ORDER — VALSARTAN 40 MG/1
40 TABLET ORAL DAILY
Qty: 30 TABLET | Refills: 3 | Status: SHIPPED | OUTPATIENT
Start: 2024-07-02

## 2024-07-08 ENCOUNTER — OFFICE VISIT (OUTPATIENT)
Dept: FAMILY MEDICINE CLINIC | Facility: CLINIC | Age: 84
End: 2024-07-08
Payer: MEDICARE

## 2024-07-08 VITALS
SYSTOLIC BLOOD PRESSURE: 124 MMHG | HEIGHT: 70 IN | BODY MASS INDEX: 28.2 KG/M2 | OXYGEN SATURATION: 97 % | WEIGHT: 197 LBS | HEART RATE: 77 BPM | DIASTOLIC BLOOD PRESSURE: 70 MMHG

## 2024-07-08 DIAGNOSIS — H60.331 ACUTE SWIMMER'S EAR OF RIGHT SIDE: Primary | ICD-10-CM

## 2024-07-08 PROCEDURE — 3074F SYST BP LT 130 MM HG: CPT | Performed by: STUDENT IN AN ORGANIZED HEALTH CARE EDUCATION/TRAINING PROGRAM

## 2024-07-08 PROCEDURE — 99213 OFFICE O/P EST LOW 20 MIN: CPT | Performed by: STUDENT IN AN ORGANIZED HEALTH CARE EDUCATION/TRAINING PROGRAM

## 2024-07-08 PROCEDURE — 3078F DIAST BP <80 MM HG: CPT | Performed by: STUDENT IN AN ORGANIZED HEALTH CARE EDUCATION/TRAINING PROGRAM

## 2024-07-08 NOTE — PROGRESS NOTES
Chief Complaint   Patient presents with    Earache       HPI:  Pipe Watkins is a 84 y.o. male who presents today for right ear problem.     Pt reports right ear discomfort, decrease hearing and drainage for last several days. Has been seen in office over last few months 2-3 times for similar issue. Was diagnosed with swimmers ear and treated w drops which usually resolves symptoms. Ultimately he was referred to ENT and saw provider last month who cleaned his ear out. States he was told no infection was noted at that time. Had not had recurrence in sympotms until the last few days. Symptoms feel similar to his previous episodes. He was in [place] a couple of weeks ago at his other home and in water. He notes drainage from the ear when he lays down at night. Tried using OTC debrox drops for last few days w/o improvement.       PE:  Vitals:    07/08/24 0929   BP: 124/70   Pulse: 77   SpO2: 97%      Body mass index is 28.27 kg/m².    Gen Appearance: NAD  HEENT: Normocephalic, EOMI. Left ear canal is normal, no erythema or effusion noted TM appears normal. Right canal initially blocked by cerumen. After removal of cerumen the innermost part of external canal is edematous with purulent drainage noted.  Lungs: Normal WOB  MSK: Moves all extremities well, normal gait, no peripheral edema  Neuro: No focal deficits    Current Outpatient Medications   Medication Sig Dispense Refill    apixaban (Eliquis) 5 MG tablet tablet TAKE 1 TABLET BY MOUTH TWICE DAILY 60 tablet 5    Cholecalciferol (VITAMIN D3) 125 MCG (5000 UT) capsule capsule Take 4,000 Units by mouth Daily.      cloNIDine (CATAPRES) 0.1 MG tablet Take 0.5 tablet by mouth as needed systolic blood pressure greater 160 16 tablet 0    dorzolamide-timolol (COSOPT) 2-0.5 % ophthalmic solution INSTILL 1 DROP IN BOTH EYES EVERY 12 HOURS      folic acid (FOLVITE) 1 MG tablet Take  by mouth Daily.      magnesium oxide (MAG-OX) 400 MG tablet Take 1 tablet by mouth Daily.  90 tablet 3    methotrexate 2.5 MG tablet Take 3 tablets by mouth 1 (One) Time Per Week.      nebivolol (BYSTOLIC) 5 MG tablet Take 1 tablet by mouth Daily. 30 tablet 0    Omega-3 Fatty Acids (fish oil) 1000 MG capsule capsule Take  by mouth Every Night.      valsartan (DIOVAN) 40 MG tablet Take 1 tablet by mouth Daily. 30 tablet 3    vitamin B-12 (CYANOCOBALAMIN) 1000 MCG tablet Take 1 tablet by mouth Daily.      vitamin C (ASCORBIC ACID) 250 MG tablet Take 2 tablets by mouth Daily.      vitamin E 100 UNIT capsule Take 4 capsules by mouth Daily.      Zinc 50 MG capsule Take 50 mg by mouth Daily.      neomycin-polymyxin-hydrocortisone (CORTISPORIN) 3.5-02367-7 otic solution Administer 3 drops to the right ear 4 (Four) Times a Day 5 mL 1     No current facility-administered medications for this visit.        A/P:  Diagnoses and all orders for this visit:    1. Acute swimmer's ear of right side (Primary)  -     neomycin-polymyxin-hydrocortisone (CORTISPORIN) 3.5-97275-8 otic solution; Administer 3 drops to the right ear 4 (Four) Times a Day  Dispense: 5 mL; Refill: 1       Exam concerning for AOE of right ear, has had similar episodes in past. Trial Cortisporin drops. Counseled on need for FU ASAP w ENT if symptoms do not improve over next few days. Cautioned on excessive use of debrox if he were to have recurrence in his discomfort as I'm concerned it may contribute to recurrent infections.    Return if symptoms worsen or fail to improve.     Dictated Utilizing Dragon Dictation    Please note that portions of this note were completed with a voice recognition program.    Part of this note may be an electronic transcription/translation of spoken language to printed text using the Dragon Dictation System.

## 2024-07-31 ENCOUNTER — TELEPHONE (OUTPATIENT)
Dept: CARDIOLOGY | Facility: CLINIC | Age: 84
End: 2024-07-31
Payer: MEDICARE

## 2024-08-06 RX ORDER — NEBIVOLOL 2.5 MG/1
2.5 TABLET ORAL DAILY
Qty: 90 TABLET | Refills: 1 | OUTPATIENT
Start: 2024-08-06

## 2024-08-06 RX ORDER — NEBIVOLOL 5 MG/1
5 TABLET ORAL DAILY
Qty: 30 TABLET | Refills: 0 | Status: SHIPPED | OUTPATIENT
Start: 2024-08-06

## 2024-09-03 NOTE — PROGRESS NOTES
[facility]             Follow Up Office Visit      Date: 2024   Patient Name: Pipe Watkins  : 1940   MRN: [id number]     Referring Provider: Teddy Paredes MD    Chief Complaint:      ICD-10-CM ICD-9-CM   1. Major depressive disorder, recurrent episode, moderate degree  F33.1 296.32   2. Generalized anxiety disorder  F41.1 300.02     History of Present Illness:   Pipe Watkins is a 83 y.o. male who is here today for follow up with MDD/CATHERINE.    Patient is seen and evaluated in the office with his wife present.  Patient states that he is still titrating off of Zoloft.  He is currently down to 25 mg of Zoloft a day.  He took 1 dose of Rexulti, and experienced severe drop in blood pressure.  He went to his cardiologist a few days before this and cardiologist had decreased his blood pressure medications to 80 mg.  Patient's depression and anxiety have improved significantly since last visit.  He is not tearful today.  He is smiling and happy.  They are actually planning on going to [place] next week now.  Patient was able to drive himself to this appointment, whereas previously his wife was driving him.  It appears as though the Zoloft was making patient extremely anxious/depressed.  We talked in detail regarding patient's Gene site testing, sensitivity to serotonin, and side effects that he has experienced with medications.  We will not start any new medications today.  Patient has prescription of Ativan that he may use in emergency situations.  He states that he would likely only take a quarter of a tablet at bedtime as he does not like the way that the Ativan makes him feel although it does help with anxiety.  Patient questions use of CBD oil.  We reviewed pros and cons of this.  He states that he may try this.  Writer advised patient to continue with L Theanine and Magnesium supplements.  Patient is agreeable with this.  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    Previous Medication Trials:  Lexapro, Cymbalta (palpitations), Prozac, Pristiq, Effexor, Zoloft, Rexulti (dropped BP)    Subjective      Review of Systems:   Review of Systems   Constitutional:  Negative for chills, fatigue and fever.   HENT:  Negative for congestion, hearing loss, sore throat and trouble swallowing.    Eyes:  Negative for blurred vision and double vision.   Respiratory:  Negative for cough, chest tightness and shortness of breath.    Cardiovascular:  Negative for chest pain and palpitations.   Gastrointestinal:  Negative for abdominal pain, constipation, diarrhea, nausea and vomiting.   Endocrine: Negative for polydipsia and polyuria.   Genitourinary:  Negative for hematuria and urgency.   Musculoskeletal:  Negative for arthralgias.   Skin:  Negative for skin lesions and bruise.   Neurological:  Negative for dizziness, tremors, seizures and light-headedness.   Hematological:  Negative for adenopathy.     Screening Scores:   PHQ-9 : 0  CATHERINE-7 : 0    Medications:     Current Outpatient Medications:     apixaban (Eliquis) 5 MG tablet tablet, TAKE 1 TABLET BY MOUTH TWICE DAILY, Disp: 60 tablet, Rfl: 5    Cholecalciferol (VITAMIN D3) 125 MCG (5000 UT) capsule capsule, Take 4,000 Units by mouth Daily., Disp: , Rfl:     cloNIDine (CATAPRES) 0.1 MG tablet, Take 0.5 tablet by mouth as needed systolic blood pressure greater 160, Disp: 16 tablet, Rfl: 0    dorzolamide-timolol (COSOPT) 2-0.5 % ophthalmic solution, INSTILL 1 DROP IN BOTH EYES EVERY 12 HOURS, Disp: , Rfl:     folic acid (FOLVITE) 1 MG tablet, Take  by mouth Daily., Disp: , Rfl:     magnesium oxide (MAG-OX) 400 MG tablet, Take 1 tablet by mouth Daily., Disp: 90 tablet, Rfl: 3    methotrexate 2.5 MG tablet, Take 3 tablets by mouth 1 (One) Time Per Week., Disp: , Rfl:     nebivolol (BYSTOLIC) 5 MG tablet, Take 1 tablet by mouth Daily., Disp: 30 tablet, Rfl: 0    Omega-3 Fatty Acids (fish oil) 1000 MG capsule capsule, Take  by mouth Every  "Night., Disp: , Rfl:     valsartan (DIOVAN) 160 MG tablet, Take 0.5 tablets by mouth Daily., Disp: , Rfl:     vitamin B-12 (CYANOCOBALAMIN) 1000 MCG tablet, Take 1 tablet by mouth Daily., Disp: , Rfl:     vitamin C (ASCORBIC ACID) 250 MG tablet, Take 2 tablets by mouth Daily., Disp: , Rfl:     vitamin E 100 UNIT capsule, Take 4 capsules by mouth Daily., Disp: , Rfl:     Zinc 50 MG capsule, Take 50 mg by mouth Daily., Disp: , Rfl:     Medication Considerations:  ARMANDO reviewed and appropriate.     Allergies:   Allergies   Allergen Reactions    Other Cough    Ace Inhibitors      Cough     Lipitor [Atorvastatin]      Severe depression    Paroxetine Other (See Comments)    Penicillins Hives    Diltiazem Unknown (See Comments)     Objective     Vital Signs:   Vitals:    06/14/24 1033   BP: 128/82   Pulse: 61   SpO2: 98%   Weight: 87.5 kg (192 lb 14.4 oz)   Height: 177.8 cm (70\")       Body mass index is 27.68 kg/m².     Mental Status Exam:   MENTAL STATUS EXAM   General Appearance:  Cleanly groomed and dressed  Eye Contact:  Good eye contact  Attitude:  Cooperative  Motor Activity:  Normal gait, posture  Muscle Strength:  Normal  Speech:  Normal rate, tone, volume  Language:  Spontaneous  Mood and affect:  Normal, pleasant and appropriate  Hopelessness:  Denies  Thought Process:  Logical and goal-directed  Associations/ Thought Content:  No delusions  Hallucinations:  None  Suicidal Ideations:  Not present  Homicidal Ideation:  Not present  Sensorium:  Alert  Orientation:  Person, place, time and situation  Immediate Recall, Recent, and Remote Memory:  Intact  Attention Span/ Concentration:  Good  Fund of Knowledge:  Appropriate for age and educational level  Intellectual Functioning:  Average range  Insight:  Fair  Judgement:  Fair  Reliability:  Fair  Impulse Control:  Fair      SUICIDE RISK ASSESSMENT/CSSRS:  1. Does patient have thoughts of suicide? denies  2. Does patient have intent for suicide? denies  3. Does " patient have a current plan for suicide? denies  4. History of suicide attempts: denies  5. Family history of suicide or attempts: denies  6. History of violent behaviors towards others or property or thoughts of committing suicide: denies  7. History of sexual aggression toward others: denies  8. Access to firearms or weapons: denies    Assessment / Plan      Visit Diagnosis/Orders Placed This Visit:  Diagnoses and all orders for this visit:    Generalized anxiety disorder (Primary)  Adjustment Disorder with depressed mood  - Continue L-Theanine and Magnesium supplements  - Use 1/4 tablet of Ativan Prn anxiety  - Patient interested in trying CBD oil for anxiety  - Continue therapy   - Follow up with writer in 1 mo  Labs Reviewed : labs from 6/15/23 reviewed  Chart Reviewed      Face to Face time: 10:38-11:10: 32 minutes    Functional Status: Mild impairment      Prognosis: Fair with Ongoing Treatment     Short-term goals: Patient will adhere to medication regimen and experience continued improvement in symptoms over the next 3 months.   Long-term goals: Patient will adhere to medication treatment plan and report improvement in symptoms over the next 6 months    Impression/Formulation:  Patient appeared alert and oriented. Patient is receptive to assistance with maintaining a stable lifestyle.  Patient presents with history of     ICD-10-CM ICD-9-CM   1. Major depressive disorder, recurrent episode, moderate degree  F33.1 296.32   2. Generalized anxiety disorder  F41.1 300.02     Treatment Plan:     Patient will continue supportive psychotherapy efforts and medications as indicated. Clinic will obtain release of information for current treatment team for continuity of care as needed. Patient will contact this office, call 911 or present to the nearest emergency room should suicidal or homicidal ideations occur.  Discussed medication options and treatment plan of prescribed medication(s) as well as the risks,  benefits, and potential side effects. Patient ackowledged and verbally consented to continue with current treatment plan and was educated on the importance of compliance with treatment and follow-up appointments.     Quality Measures:  Tobacco: Pipe Watkins  reports that he has never smoked. He has never been exposed to tobacco smoke. He has never used smokeless tobacco. I have educated him on the risk of diseases from using tobacco products such as cancer, COPD, and heart disease.       Follow Up:   Return in about 1 month (around 7/14/2024) for Medication Management, follow up with therapy.    Chyna Cedeño MD  [facility]     This is electronically signed by Chyna Cedeño MD   05/14/2024 09:10 EDT

## 2024-09-06 RX ORDER — NEBIVOLOL 5 MG/1
5 TABLET ORAL DAILY
Qty: 30 TABLET | Refills: 3 | Status: SHIPPED | OUTPATIENT
Start: 2024-09-06

## 2024-09-23 ENCOUNTER — OFFICE VISIT (OUTPATIENT)
Dept: CARDIOLOGY | Facility: CLINIC | Age: 84
End: 2024-09-23
Payer: MEDICARE

## 2024-09-23 VITALS
HEART RATE: 81 BPM | DIASTOLIC BLOOD PRESSURE: 90 MMHG | BODY MASS INDEX: 28.43 KG/M2 | OXYGEN SATURATION: 98 % | HEIGHT: 70 IN | WEIGHT: 198.6 LBS | SYSTOLIC BLOOD PRESSURE: 130 MMHG

## 2024-09-23 DIAGNOSIS — I49.5 SICK SINUS SYNDROME: Primary | ICD-10-CM

## 2024-09-23 DIAGNOSIS — E78.2 MIXED HYPERLIPIDEMIA: ICD-10-CM

## 2024-09-23 DIAGNOSIS — I48.0 PAROXYSMAL ATRIAL FIBRILLATION: ICD-10-CM

## 2024-09-23 DIAGNOSIS — I47.10 SVT (SUPRAVENTRICULAR TACHYCARDIA): ICD-10-CM

## 2024-09-23 DIAGNOSIS — I10 ESSENTIAL HYPERTENSION: ICD-10-CM

## 2024-09-23 PROCEDURE — 99214 OFFICE O/P EST MOD 30 MIN: CPT | Performed by: INTERNAL MEDICINE

## 2024-09-23 PROCEDURE — 3075F SYST BP GE 130 - 139MM HG: CPT | Performed by: INTERNAL MEDICINE

## 2024-09-23 PROCEDURE — 3080F DIAST BP >= 90 MM HG: CPT | Performed by: INTERNAL MEDICINE

## 2024-09-23 PROCEDURE — 93280 PM DEVICE PROGR EVAL DUAL: CPT | Performed by: INTERNAL MEDICINE

## 2024-09-25 ENCOUNTER — LAB (OUTPATIENT)
Dept: LAB | Facility: HOSPITAL | Age: 84
End: 2024-09-25
Payer: MEDICARE

## 2024-09-25 ENCOUNTER — OFFICE VISIT (OUTPATIENT)
Dept: FAMILY MEDICINE CLINIC | Facility: CLINIC | Age: 84
End: 2024-09-25
Payer: MEDICARE

## 2024-09-25 VITALS
OXYGEN SATURATION: 97 % | WEIGHT: 199.6 LBS | SYSTOLIC BLOOD PRESSURE: 132 MMHG | BODY MASS INDEX: 28.58 KG/M2 | HEART RATE: 70 BPM | DIASTOLIC BLOOD PRESSURE: 84 MMHG | HEIGHT: 70 IN

## 2024-09-25 DIAGNOSIS — R10.13 EPIGASTRIC PAIN: ICD-10-CM

## 2024-09-25 DIAGNOSIS — R53.83 OTHER FATIGUE: ICD-10-CM

## 2024-09-25 DIAGNOSIS — F32.A DEPRESSION, UNSPECIFIED DEPRESSION TYPE: Primary | ICD-10-CM

## 2024-09-25 DIAGNOSIS — F41.9 ANXIETY: ICD-10-CM

## 2024-09-25 PROCEDURE — 99214 OFFICE O/P EST MOD 30 MIN: CPT | Performed by: INTERNAL MEDICINE

## 2024-09-25 PROCEDURE — 82746 ASSAY OF FOLIC ACID SERUM: CPT

## 2024-09-25 PROCEDURE — 84443 ASSAY THYROID STIM HORMONE: CPT

## 2024-09-25 PROCEDURE — 80053 COMPREHEN METABOLIC PANEL: CPT

## 2024-09-25 PROCEDURE — 3075F SYST BP GE 130 - 139MM HG: CPT | Performed by: INTERNAL MEDICINE

## 2024-09-25 PROCEDURE — 3079F DIAST BP 80-89 MM HG: CPT | Performed by: INTERNAL MEDICINE

## 2024-09-25 PROCEDURE — 85027 COMPLETE CBC AUTOMATED: CPT

## 2024-09-25 PROCEDURE — 36415 COLL VENOUS BLD VENIPUNCTURE: CPT

## 2024-09-25 PROCEDURE — 82607 VITAMIN B-12: CPT

## 2024-09-25 RX ORDER — PANTOPRAZOLE SODIUM 40 MG/1
40 TABLET, DELAYED RELEASE ORAL DAILY
Qty: 30 TABLET | Refills: 1 | Status: SHIPPED | OUTPATIENT
Start: 2024-09-25

## 2024-09-26 LAB
ALBUMIN SERPL-MCNC: 4.2 G/DL (ref 3.5–5.2)
ALBUMIN/GLOB SERPL: 1.8 G/DL
ALP SERPL-CCNC: 35 U/L (ref 39–117)
ALT SERPL W P-5'-P-CCNC: 15 U/L (ref 1–41)
ANION GAP SERPL CALCULATED.3IONS-SCNC: 8.8 MMOL/L (ref 5–15)
AST SERPL-CCNC: 16 U/L (ref 1–40)
BILIRUB SERPL-MCNC: 0.4 MG/DL (ref 0–1.2)
BUN SERPL-MCNC: 23 MG/DL (ref 8–23)
BUN/CREAT SERPL: 25 (ref 7–25)
CALCIUM SPEC-SCNC: 9.9 MG/DL (ref 8.6–10.5)
CHLORIDE SERPL-SCNC: 98 MMOL/L (ref 98–107)
CO2 SERPL-SCNC: 29.2 MMOL/L (ref 22–29)
CREAT SERPL-MCNC: 0.92 MG/DL (ref 0.76–1.27)
DEPRECATED RDW RBC AUTO: 44.4 FL (ref 37–54)
EGFRCR SERPLBLD CKD-EPI 2021: 81.5 ML/MIN/1.73
ERYTHROCYTE [DISTWIDTH] IN BLOOD BY AUTOMATED COUNT: 13 % (ref 12.3–15.4)
FOLATE SERPL-MCNC: >20 NG/ML (ref 4.78–24.2)
GLOBULIN UR ELPH-MCNC: 2.4 GM/DL
GLUCOSE SERPL-MCNC: 105 MG/DL (ref 65–99)
HCT VFR BLD AUTO: 45.7 % (ref 37.5–51)
HGB BLD-MCNC: 15.5 G/DL (ref 13–17.7)
MCH RBC QN AUTO: 31.6 PG (ref 26.6–33)
MCHC RBC AUTO-ENTMCNC: 33.9 G/DL (ref 31.5–35.7)
MCV RBC AUTO: 93.1 FL (ref 79–97)
PLATELET # BLD AUTO: 236 10*3/MM3 (ref 140–450)
PMV BLD AUTO: 9.9 FL (ref 6–12)
POTASSIUM SERPL-SCNC: 4.7 MMOL/L (ref 3.5–5.2)
PROT SERPL-MCNC: 6.6 G/DL (ref 6–8.5)
RBC # BLD AUTO: 4.91 10*6/MM3 (ref 4.14–5.8)
SODIUM SERPL-SCNC: 136 MMOL/L (ref 136–145)
TSH SERPL DL<=0.05 MIU/L-ACNC: 0.94 UIU/ML (ref 0.27–4.2)
VIT B12 BLD-MCNC: 1027 PG/ML (ref 211–946)
WBC NRBC COR # BLD AUTO: 5.75 10*3/MM3 (ref 3.4–10.8)

## 2024-09-26 RX ORDER — PANTOPRAZOLE SODIUM 40 MG/1
40 TABLET, DELAYED RELEASE ORAL DAILY
Qty: 90 TABLET | OUTPATIENT
Start: 2024-09-26

## 2024-10-21 ENCOUNTER — HOSPITAL ENCOUNTER (OUTPATIENT)
Dept: GENERAL RADIOLOGY | Facility: HOSPITAL | Age: 84
Discharge: HOME | End: 2024-10-21
Admitting: INTERNAL MEDICINE
Payer: MEDICARE

## 2024-10-21 ENCOUNTER — OFFICE VISIT (OUTPATIENT)
Dept: FAMILY MEDICINE CLINIC | Facility: CLINIC | Age: 84
End: 2024-10-21
Payer: MEDICARE

## 2024-10-21 VITALS
SYSTOLIC BLOOD PRESSURE: 142 MMHG | HEIGHT: 70 IN | DIASTOLIC BLOOD PRESSURE: 92 MMHG | BODY MASS INDEX: 28.52 KG/M2 | OXYGEN SATURATION: 96 % | HEART RATE: 71 BPM | WEIGHT: 199.2 LBS

## 2024-10-21 DIAGNOSIS — E78.2 MIXED HYPERLIPIDEMIA: ICD-10-CM

## 2024-10-21 DIAGNOSIS — M79.601 RIGHT ARM PAIN: ICD-10-CM

## 2024-10-21 DIAGNOSIS — F32.A DEPRESSION, UNSPECIFIED DEPRESSION TYPE: ICD-10-CM

## 2024-10-21 DIAGNOSIS — I48.0 PAROXYSMAL ATRIAL FIBRILLATION: ICD-10-CM

## 2024-10-21 DIAGNOSIS — I10 ESSENTIAL HYPERTENSION: Primary | ICD-10-CM

## 2024-10-21 PROCEDURE — 73080 X-RAY EXAM OF ELBOW: CPT

## 2024-10-21 PROCEDURE — G0439 PPPS, SUBSEQ VISIT: HCPCS | Performed by: INTERNAL MEDICINE

## 2024-10-21 PROCEDURE — 73090 X-RAY EXAM OF FOREARM: CPT

## 2024-10-21 PROCEDURE — 1170F FXNL STATUS ASSESSED: CPT | Performed by: INTERNAL MEDICINE

## 2024-10-21 PROCEDURE — 99213 OFFICE O/P EST LOW 20 MIN: CPT | Performed by: INTERNAL MEDICINE

## 2024-10-21 PROCEDURE — 3077F SYST BP >= 140 MM HG: CPT | Performed by: INTERNAL MEDICINE

## 2024-10-21 PROCEDURE — 3080F DIAST BP >= 90 MM HG: CPT | Performed by: INTERNAL MEDICINE

## 2024-10-21 NOTE — PROGRESS NOTES
Subjective   The ABCs of the Annual Wellness Visit  Medicare Wellness Visit      Pipe Watkins is a 84 y.o. patient who presents for a Medicare Wellness Visit.    The following portions of the patient's history were reviewed and   updated as appropriate: He  has a past medical history of Abnormal ECG, Alcohol abuse, Arrhythmia, Arthritis, Depression, Hyperlipidemia, Hypertension, Osteoarthritis, Paroxysmal atrial fibrillation (12/22/2021), Sick sinus syndrome, SVT (supraventricular tachycardia), Wears hearing aid, and Wears prescription eyeglasses.  He does not have any pertinent problems on file.  He  has a past surgical history that includes Knee arthroscopy w/ meniscal repair (Left); Pacemaker Implantation (01/13/2015); Colonoscopy; Cardiac electrophysiology procedure (N/A, 1/18/2017); Tonsillectomy; Ablation of dysrhythmic focus; and Insert / replace / remove pacemaker.  His family history includes Heart attack in his father; Hypertension in his mother.  He  reports that he has never smoked. He has never been exposed to tobacco smoke. He has never used smokeless tobacco. He reports that he does not drink alcohol and does not use drugs.  Current Outpatient Medications   Medication Sig Dispense Refill    apixaban (Eliquis) 5 MG tablet tablet TAKE 1 TABLET BY MOUTH TWICE DAILY 60 tablet 5    CBD oil (cannabidiol) capsule Take  by mouth.      Cholecalciferol (VITAMIN D3) 125 MCG (5000 UT) capsule capsule Take 4,000 Units by mouth Daily.      cloNIDine (CATAPRES) 0.1 MG tablet Take 0.5 tablet by mouth as needed systolic blood pressure greater 160 16 tablet 0    dorzolamide-timolol (COSOPT) 2-0.5 % ophthalmic solution INSTILL 1 DROP IN BOTH EYES EVERY 12 HOURS      folic acid (FOLVITE) 1 MG tablet Take  by mouth Daily.      magnesium oxide (MAG-OX) 400 MG tablet Take 1 tablet by mouth Daily. 90 tablet 3    methotrexate 2.5 MG tablet Take 3 tablets by mouth 1 (One) Time Per Week.      nebivolol (BYSTOLIC) 5 MG  tablet Take 1 tablet by mouth Daily. 30 tablet 3    Omega-3 Fatty Acids (fish oil) 1000 MG capsule capsule Take  by mouth Every Night.      valsartan (DIOVAN) 40 MG tablet Take 1 tablet by mouth Daily. 30 tablet 3    vitamin B-12 (CYANOCOBALAMIN) 1000 MCG tablet Take 1 tablet by mouth Daily.      vitamin E 100 UNIT capsule Take 4 capsules by mouth Daily.      Zinc 50 MG capsule Take 50 mg by mouth Daily.       No current facility-administered medications for this visit.     He is allergic to other, ace inhibitors, lipitor [atorvastatin], paroxetine, penicillins, and diltiazem..    Compared to one year ago, the patient's physical   health is the same.  Compared to one year ago, the patient's mental   health is the same.    Recent Hospitalizations:  This patient has had a [facility] admission record on file within the last 365 days.  Current Medical Providers:  Patient Care Team:  Teddy Paredes MD as PCP - General (Internal Medicine)  Parish Craft III, MD as Cardiologist (Cardiology)  Chyna Cedeño MD as Consulting Physician (Psychiatry)    Outpatient Medications Prior to Visit   Medication Sig Dispense Refill    apixaban (Eliquis) 5 MG tablet tablet TAKE 1 TABLET BY MOUTH TWICE DAILY 60 tablet 5    CBD oil (cannabidiol) capsule Take  by mouth.      Cholecalciferol (VITAMIN D3) 125 MCG (5000 UT) capsule capsule Take 4,000 Units by mouth Daily.      cloNIDine (CATAPRES) 0.1 MG tablet Take 0.5 tablet by mouth as needed systolic blood pressure greater 160 16 tablet 0    dorzolamide-timolol (COSOPT) 2-0.5 % ophthalmic solution INSTILL 1 DROP IN BOTH EYES EVERY 12 HOURS      folic acid (FOLVITE) 1 MG tablet Take  by mouth Daily.      magnesium oxide (MAG-OX) 400 MG tablet Take 1 tablet by mouth Daily. 90 tablet 3    methotrexate 2.5 MG tablet Take 3 tablets by mouth 1 (One) Time Per Week.      nebivolol (BYSTOLIC) 5 MG tablet Take 1 tablet by mouth Daily. 30 tablet 3    Omega-3 Fatty Acids (fish oil) 1000  MG capsule capsule Take  by mouth Every Night.      valsartan (DIOVAN) 40 MG tablet Take 1 tablet by mouth Daily. 30 tablet 3    vitamin B-12 (CYANOCOBALAMIN) 1000 MCG tablet Take 1 tablet by mouth Daily.      vitamin E 100 UNIT capsule Take 4 capsules by mouth Daily.      Zinc 50 MG capsule Take 50 mg by mouth Daily.      pantoprazole (Protonix) 40 MG EC tablet Take 1 tablet by mouth Daily. 30 tablet 1    vitamin C (ASCORBIC ACID) 250 MG tablet Take 2 tablets by mouth Daily.      neomycin-polymyxin-hydrocortisone (CORTISPORIN) 3.5-07585-9 otic solution Administer 3 drops to the right ear 4 (Four) Times a Day 5 mL 1     No facility-administered medications prior to visit.     No opioid medication identified on active medication list. I have reviewed chart for other potential  high risk medication/s and harmful drug interactions in the elderly.      Aspirin is not on active medication list.  Aspirin use is not indicated based on review of current medical condition/s. Risk of harm outweighs potential benefits.  .    Patient Active Problem List   Diagnosis    Pacemaker    Essential hypertension    Vasovagal syncope    AVNRT (AV praveen re-entry tachycardia)    Sick sinus syndrome    Hyperlipidemia    Osteoarthritis    Depression    Paroxysmal atrial fibrillation    Allergic rhinitis    Bilateral myopia    Bilateral presbyopia    Cataract, nuclear sclerotic senile    Degenerative arthritis of left knee    Dry eyes    Hip pain    History of alcoholism    Hypercoagulable state    Insufficiency of tear film of both eyes    Nonexudative age-related macular degeneration, bilateral, early dry stage    Paget's disease of bony pelvis    Posterior vitreous detachment    S/P total knee arthroplasty    Secondary cataract    Transient ischemic attack    Vitreous prolapse of left eye    Osteoarthritis    SVT (supraventricular tachycardia)    Excess skin of eyelid    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  ACP discussion was held with the patient during this visit. Patient has an advance directive in EMR which is still valid.             Objective   Vitals:    10/21/24 1025   BP: 142/92   Pulse: 71   SpO2: 96%   Weight: 90.4 kg (199 lb 3.2 oz)   Height: 177.8 cm (70\")       Estimated body mass index is 28.58 kg/m² as calculated from the following:    Height as of this encounter: 177.8 cm (70\").    Weight as of this encounter: 90.4 kg (199 lb 3.2 oz).            Does the patient have evidence of cognitive impairment? No                                                                                                Health  Risk Assessment    Smoking Status:  Social History     Tobacco Use   Smoking Status Never    Passive exposure: Never   Smokeless Tobacco Never     Alcohol Consumption:  Social History     Substance and Sexual Activity   Alcohol Use No    Comment: 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        Fall Risk Screen  STEADI Fall Risk Assessment was completed, and patient is at LOW risk for falls.Assessment completed on:10/21/2024    Depression Screening:      10/21/2024    10:40 AM   PHQ-2/PHQ-9 Depression Screening   Little interest or pleasure in doing things Not at all   Feeling down, depressed, or hopeless Not at all     Health Habits and Functional and Cognitive Screening:      10/21/2024    10:39 AM   Functional & Cognitive Status   Do you have difficulty preparing food and eating? No   Do you have difficulty bathing yourself, getting dressed or grooming yourself? No   Do you have difficulty using the toilet? No   Do you have difficulty " moving around from place to place? No   Do you have trouble with steps or getting out of a bed or a chair? No   Current Diet Well Balanced Diet   Dental Exam Up to date   Eye Exam Up to date   Exercise (times per week) 7 times per week   Current Exercises Include Walking;Light Weights   Do you need help using the phone?  No   Are you deaf or do you have serious difficulty hearing?  Yes   Do you need help to go to places out of walking distance? No   Do you need help shopping? No   Do you need help preparing meals?  No   Do you need help with housework?  No   Do you need help with laundry? No   Do you need help taking your medications? No   Do you need help managing money? No   Have you felt unusual stress, anger or loneliness in the last month? No   Who do you live with? Spouse   If you need help, do you have trouble finding someone available to you? No   Have you been bothered in the last four weeks by sexual problems? No   Do you have difficulty concentrating, remembering or making decisions? No           Age-appropriate Screening Schedule:  Refer to the list below for future screening recommendations based on patient's age, sex and/or medical conditions. Orders for these recommended tests are listed in the plan section. The patient has been provided with a written plan.    Health Maintenance List  Health Maintenance   Topic Date Due    RSV Vaccine - Adults (1 - 1-dose 75+ series) Never done    INFLUENZA VACCINE  08/01/2024    LIPID PANEL  05/19/2025    BMI FOLLOWUP  09/25/2025    ANNUAL WELLNESS VISIT  10/21/2025    TDAP/TD VACCINES (3 - Td or Tdap) 05/19/2032    COVID-19 Vaccine  Completed    Pneumococcal Vaccine 65+  Completed    ZOSTER VACCINE  Completed                                                                                                                                                CMS Preventative Services Quick Reference  Risk Factors Identified During Encounter  Immunizations  Discussed/Encouraged: Influenza  Dental Screening Recommended  Vision Screening Recommended    The above risks/problems have been discussed with the patient.  Pertinent information has been shared with the patient in the After Visit Summary.  An After Visit Summary and PPPS were made available to the patient.    Follow Up:   Next Medicare Wellness visit to be scheduled in 1 year.         Additional E&M Note during same encounter follows:  Patient has additional, significant, and separately identifiable condition(s)/problem(s) that require work above and beyond the Medicare Wellness Visit     Chief Complaint  Medicare Wellness-subsequent    Subjective   HPI  Rogelio is also being seen today for additional medical problem/s.    He continues to treat his mood disorder with CBD oil. He also will take dextromethorphan  x 1 dose which helps. Continues with counseling    She did see Rheumatology last week and sees Nephrology upcoming. His pth was elevated, but calcium was ok. He does have a history of PAGETs disease. He has not had alendronate for years after IV therapy in Dec 2022.     He reports intermittent  fatigue. Most of time rested. Does not snore- He does become bradycardic at night. There has been no witnessed apnea. His fatigue is running or steam at about 10 am. ( Pre pacemaker- his HR was in 40s and experienced syncope) He is able to do his physical activity of lifting weights and treadmill. He works his way thru this. He does not feel his fatigue is not depression      Last A1c in May was 6.1.   He remains on eliquis for his paroxysmal atrial fibrillation.  He does have a history of sick sinus syndrome.  He continues on valsartan 40 mg daily and Bystolic 2.5 mg daily. His bp at home well. Feels  anxiety has worsened it. Can run higher at home. Can be 116 -166 systiollically. He fels anxious this am. Continues pescatarian diet. BPS in 120s at home.    He banged his right forearm on the edge of the counter several  "weeks ago and has had persistent point tenderness on his lateral right forearm.  He is on Eliquis.  Pain with pronation and supination.          Objective   Vital Signs:  /92   Pulse 71   Ht 177.8 cm (70\")   Wt 90.4 kg (199 lb 3.2 oz)   SpO2 96%   BMI 28.58 kg/m²   Physical Exam  Vitals and nursing note reviewed.   Constitutional:       General: He is not in acute distress.     Appearance: He is well-developed. He is not diaphoretic.   HENT:      Head: Normocephalic and atraumatic.      Right Ear: External ear normal.      Left Ear: External ear normal.      Mouth/Throat:      Pharynx: No oropharyngeal exudate.   Eyes:      General: No scleral icterus.        Right eye: No discharge.      Conjunctiva/sclera: Conjunctivae normal.   Neck:      Thyroid: No thyromegaly.      Vascular: No JVD.      Trachea: No tracheal deviation.   Cardiovascular:      Rate and Rhythm: Normal rate and regular rhythm.      Heart sounds: Normal heart sounds.      Comments: PMI nondisplaced  Pulmonary:      Effort: Pulmonary effort is normal.      Breath sounds: Normal breath sounds. No wheezing or rales.   Abdominal:      General: Bowel sounds are normal.      Palpations: Abdomen is soft.      Tenderness: There is no abdominal tenderness. There is no guarding or rebound.   Musculoskeletal:      Cervical back: Normal range of motion and neck supple.   Lymphadenopathy:      Cervical: No cervical adenopathy.   Skin:     General: Skin is warm and dry.      Capillary Refill: Capillary refill takes less than 2 seconds.      Coloration: Skin is not pale.      Findings: No rash.   Neurological:      Mental Status: He is alert and oriented to person, place, and time.      Motor: No abnormal muscle tone.      Coordination: Coordination normal.      Comments: Tug test less than 8 seconds   Psychiatric:         Mood and Affect: Mood normal.         Behavior: Behavior normal.         Judgment: Judgment normal.                 Assessment and " "Plan         Essential hypertension  Hypertension is stable and controlled  Continue current treatment regimen.  Dietary sodium restriction.  Regular aerobic exercise.  Ambulatory blood pressure monitoring.  Blood pressure will be reassessed in 6 months.  Mixed hyperlipidemia   Lipid abnormalities are unchanged.  Nutritional counseling was provided.  Lipids will be reassessed in 6 months. Coninues on Pescatarian diet  Depression, unspecified depression type  Stable with counseling and as needed CBD oil  Paroxysmal atrial fibrillation  History of sick sinus syndrome PPM in place.  Continues on Eliquis.  Right arm pain  X-ray of forearm requested.  I suspect this may be lateral epicondylitis versus \"bone bruise\"    Orders Placed This Encounter   Procedures    XR Forearm 2 View Right     Standing Status:   Future     Number of Occurrences:   1     Standing Expiration Date:   10/21/2025     Order Specific Question:   Reason for Exam:     Answer:   pain after traum     Order Specific Question:   Release to patient     Answer:   Routine Release [[id number]]             Follow Up   Return in about 6 months (around 4/21/2025) for htn.  Patient was given instructions and counseling regarding his condition or for health maintenance advice. Please see specific information pulled into the AVS if appropriate.  "

## 2024-10-30 ENCOUNTER — TELEPHONE (OUTPATIENT)
Dept: CARDIOLOGY | Facility: CLINIC | Age: 84
End: 2024-10-30
Payer: MEDICARE

## 2024-10-30 PROCEDURE — 93296 REM INTERROG EVL PM/IDS: CPT | Performed by: INTERNAL MEDICINE

## 2024-10-30 PROCEDURE — 93294 REM INTERROG EVL PM/LDLS PM: CPT | Performed by: INTERNAL MEDICINE

## 2024-11-01 ENCOUNTER — TELEPHONE (OUTPATIENT)
Dept: CARDIOLOGY | Facility: CLINIC | Age: 84
End: 2024-11-01
Payer: MEDICARE

## 2024-11-01 RX ORDER — NEBIVOLOL 2.5 MG/1
2.5 TABLET ORAL DAILY
Qty: 90 TABLET | Refills: 1 | Status: SHIPPED | OUTPATIENT
Start: 2024-11-01

## 2024-11-01 NOTE — TELEPHONE ENCOUNTER
B/P has been running low for some time now. Had appointments this week with his Rheumatologist and Nephrologist. B/P low at both appointments. B/P  yesterday was 89/47. Requesting his Bystolic be changed back from 5 mg daily to 2.5 mg daily. Please advise.

## 2024-11-08 RX ORDER — VALSARTAN 40 MG/1
40 TABLET ORAL DAILY
Qty: 30 TABLET | Refills: 3 | Status: SHIPPED | OUTPATIENT
Start: 2024-11-08

## 2024-11-11 NOTE — TELEPHONE ENCOUNTER
"Caller: JunePipe \"Rogelio\"    Relationship: Self    Best call back number: [phone]     Requested Prescriptions:   Requested Prescriptions     Pending Prescriptions Disp Refills    apixaban (Eliquis) 5 MG tablet tablet 60 tablet 5     Sig: Take 1 tablet by mouth 2 (Two) Times a Day.        Pharmacy where request should be sent: [facility] [address] [place] [address] [address] AT [facility] [address] ([address]) & [address] [facility] [phone] [facility] [phone] FX     Last office visit with prescribing clinician: 9/23/2024   Last telemedicine visit with prescribing clinician: Visit date not found   Next office visit with prescribing clinician: 5/7/2025     Additional details provided by patient: PATIENT HAS ONE DAY REMAINING OF THIS MEDICATION AND IS OUT OF TOWN IN [place].     Does the patient have less than a 3 day supply:  [x] Yes  [] No    Would you like a call back once the refill request has been completed: [x] Yes [] No    If the office needs to give you a call back, can they leave a voicemail: [x] Yes [] No    Henok Matthews Rep   11/11/24 11:37 EST   "

## 2024-11-15 ENCOUNTER — TELEPHONE (OUTPATIENT)
Dept: FAMILY MEDICINE CLINIC | Facility: CLINIC | Age: 84
End: 2024-11-15
Payer: MEDICARE

## 2024-11-15 NOTE — TELEPHONE ENCOUNTER
"    Caller: Pipe Watkins \"Rogelio\"    Relationship to patient: Self    Best call back number:[phone]     Chief complaint: NA    Type of visit: OFFICE VISIT     Requested date: 11/21/2024     If rescheduling, when is the original appointment: NA     Additional notes:PATIENT WOULD LIKE AN OFFICE VISIT ON 11/21/2024 IF POSSIBLE. HE HAD NEED TIRED A LOT WITH FATIGUE           "

## 2024-11-15 NOTE — TELEPHONE ENCOUNTER
Got pt scheduled woth Dr Brooks, he has seen her once before and was comfortable enough to see her again

## 2024-11-21 ENCOUNTER — OFFICE VISIT (OUTPATIENT)
Dept: FAMILY MEDICINE CLINIC | Facility: CLINIC | Age: 84
End: 2024-11-21
Payer: MEDICARE

## 2024-11-21 VITALS
WEIGHT: 201.2 LBS | OXYGEN SATURATION: 97 % | HEART RATE: 69 BPM | DIASTOLIC BLOOD PRESSURE: 68 MMHG | BODY MASS INDEX: 28.8 KG/M2 | HEIGHT: 70 IN | SYSTOLIC BLOOD PRESSURE: 124 MMHG

## 2024-11-21 DIAGNOSIS — R53.83 FATIGUE, UNSPECIFIED TYPE: Primary | ICD-10-CM

## 2024-11-21 PROCEDURE — 3074F SYST BP LT 130 MM HG: CPT | Performed by: STUDENT IN AN ORGANIZED HEALTH CARE EDUCATION/TRAINING PROGRAM

## 2024-11-21 PROCEDURE — 90662 IIV NO PRSV INCREASED AG IM: CPT | Performed by: STUDENT IN AN ORGANIZED HEALTH CARE EDUCATION/TRAINING PROGRAM

## 2024-11-21 PROCEDURE — 99213 OFFICE O/P EST LOW 20 MIN: CPT | Performed by: STUDENT IN AN ORGANIZED HEALTH CARE EDUCATION/TRAINING PROGRAM

## 2024-11-21 PROCEDURE — 3078F DIAST BP <80 MM HG: CPT | Performed by: STUDENT IN AN ORGANIZED HEALTH CARE EDUCATION/TRAINING PROGRAM

## 2024-11-21 PROCEDURE — G0008 ADMIN INFLUENZA VIRUS VAC: HCPCS | Performed by: STUDENT IN AN ORGANIZED HEALTH CARE EDUCATION/TRAINING PROGRAM

## 2024-11-21 RX ORDER — ZINC GLUCONATE 50 MG
50 TABLET ORAL DAILY
COMMUNITY

## 2024-11-21 NOTE — PROGRESS NOTES
"Chief Complaint   Patient presents with    Fatigue       HPI:  Pipe Watkins is a 84 y.o. male with complex medical hx including HTN, SSS s/p pacemaker, HLD, PAF, SVT, who presents today for fatigue.    Has been feeling more tired than usual. Addressed this with his PCP at last regular follow up. Basic labs were obtained and unremarkable.   Brings a symptom diary of his fatigue symptoms. Typically more fatigued in the morning and early afternoon. Feels at his \"best\" around 8 or 9 PM.   Wondering if it could be his Bystolic. He's only on 2.5mg but reports having a lot of medication sensitivities in the past. He takes his Bystolic at night and notices that he's more energized shortly before his next dose is due.   Also wondering if it could be mood related. He does follow with Behavioral Health. Was taken off psych medications over the last year. Uses CBD oil to manage anxiety symptoms and this has worked well for him.   He sleeps well at night. Uses sleep monitoring device which consistently reports good quality sleep for his age.   Denies any change in activity level or diet. Denies night sweats, worsening back pain, SOB.   Still finds silvia in his hobbies such as woodworking.      PE:  Vitals:    11/21/24 1524   BP: 124/68   Pulse: 69   SpO2: 97%      Body mass index is 28.87 kg/m².    Gen Appearance: NAD  HEENT: Normocephalic, EOMI, no thyromegaly, trachea midline  Heart: RRR, normal S1 and S2, no murmur  Lungs: CTA b/l, no wheezing, no crackles  MSK: Moves all extremities well, normal gait, no peripheral edema  Neuro: No focal deficits    Current Outpatient Medications   Medication Sig Dispense Refill    apixaban (Eliquis) 5 MG tablet tablet Take 1 tablet by mouth 2 (Two) Times a Day. 60 tablet 5    CBD oil (cannabidiol) capsule Take  by mouth.      Cholecalciferol (VITAMIN D3) 125 MCG (5000 UT) capsule capsule Take 4,000 Units by mouth Daily.      cloNIDine (CATAPRES) 0.1 MG tablet Take 0.5 tablet by mouth " "as needed systolic blood pressure greater 160 16 tablet 0    dorzolamide-timolol (COSOPT) 2-0.5 % ophthalmic solution INSTILL 1 DROP IN BOTH EYES EVERY 12 HOURS      folic acid (FOLVITE) 1 MG tablet Take  by mouth Daily.      magnesium oxide (MAG-OX) 400 MG tablet Take 1 tablet by mouth Daily. 90 tablet 3    methotrexate 2.5 MG tablet Take 3 tablets by mouth 1 (One) Time Per Week.      nebivolol (Bystolic) 2.5 MG tablet Take 1 tablet by mouth Daily. 90 tablet 1    Omega-3 Fatty Acids (fish oil) 1000 MG capsule capsule Take  by mouth Every Night.      valsartan (DIOVAN) 40 MG tablet Take 1 tablet by mouth Daily. 30 tablet 3    vitamin B-12 (CYANOCOBALAMIN) 1000 MCG tablet Take 1 tablet by mouth Daily.      vitamin E 100 UNIT capsule Take 4 capsules by mouth Daily.      Zinc 50 MG capsule Take 50 mg by mouth Daily.      zinc gluconate 50 MG tablet Take 1 tablet by mouth Daily.       No current facility-administered medications for this visit.        A/P:  Diagnoses and all orders for this visit:    1. Fatigue, unspecified type (Primary)  Pt presents w ongoing fatigue. Spent several minutes reviewing patient's sleep patterns, daily routine, diet and medical history. Explained that it may be difficult to determine specific etiology for fatigue. This may require several visits to fully work up and begin treatment plan. He has already had basic laboratory evaluation to rule out common causes- this was unremarkable. He has done an excellent job of keeping his \"symptom diary.\" Does not appear to be a sleep issue based on remote monitoring so I do not feel sleep study would be of significant benefit at this point. Given the pattern/timing of his symptoms I suppose it could be related to his beta blocker though I did point out he is on a very low dose. This is rx'd by his Cardiologist so I advised him to reach out to his provider to see if a short trial off of the medication would be reasonable to see if his fatigue improves. " If not, we did discuss other differentials including suboptimally controlled anxiety/depression or age related fatigue. He will discuss this with his Cardiologist and potentially his behavioral health provider and follow back up as scheduled with PCP, sooner PRN.     Other orders  -     Fluzone High-Dose 65+yrs ([id number])         Dictated Utilizing Dragon Dictation    Please note that portions of this note were completed with a voice recognition program.    Part of this note may be an electronic transcription/translation of spoken language to printed text using the Dragon Dictation System.

## 2024-12-03 ENCOUNTER — OFFICE VISIT (OUTPATIENT)
Age: 84
End: 2024-12-03
Payer: MEDICARE

## 2024-12-03 VITALS
OXYGEN SATURATION: 97 % | BODY MASS INDEX: 28.76 KG/M2 | SYSTOLIC BLOOD PRESSURE: 162 MMHG | HEART RATE: 63 BPM | HEIGHT: 70 IN | DIASTOLIC BLOOD PRESSURE: 88 MMHG | WEIGHT: 200.9 LBS

## 2024-12-03 DIAGNOSIS — F33.1 MAJOR DEPRESSIVE DISORDER, RECURRENT EPISODE, MODERATE DEGREE: Primary | Chronic | ICD-10-CM

## 2024-12-03 DIAGNOSIS — F41.1 GENERALIZED ANXIETY DISORDER: Chronic | ICD-10-CM

## 2024-12-03 RX ORDER — BUPROPION HYDROCHLORIDE 100 MG/1
100 TABLET, EXTENDED RELEASE ORAL 2 TIMES DAILY
Qty: 60 TABLET | Refills: 3 | Status: SHIPPED | OUTPATIENT
Start: 2024-12-03

## 2024-12-03 NOTE — PROGRESS NOTES
[facility]             Follow Up Office Visit      Patient Name: Pipe Watkins  : 1940   MRN: [id number]     Referring Provider: Teddy Paredes MD    Chief Complaint:      ICD-10-CM ICD-9-CM   1. Major depressive disorder, recurrent episode, moderate degree  F33.1 296.32   2. Generalized anxiety disorder  F41.1 300.02      History of Present Illness:   Pipe Watkins is a 84 y.o. male   History of Present Illness    The patient is an 84-year-old male who presents for evaluation of depression. He reports experiencing feelings of depression and sadness over the weekend, which improved after social interaction and working in his studio. He has been on antidepressants for 20 years and has tried various medications for his depression, including Lexapro, Cymbalta, Prozac, Pristiq, Effexor, and Zoloft. He also reports a recent panic attack while driving, which was alleviated by taking CBD oil. He has been using CBD oil for anxiety and finds it effective. He reports feeling unwell today, experiencing fatigue and tiredness for several months. Despite normal thyroid levels, he has a history of autoimmune diseases including Paget's disease, psoriatic arthritis, and psoriasis, which are currently under control. He recently had a checkup with his nephrologist where all blood tests were normal except for an elevated PTH level. He has been off Bystolic since 10/22/2024 and is currently taking valsartan 40 mg twice daily. He monitors his blood pressure at home and reports it has not been elevated. He has a history of insomnia and takes fish oil and Tylenol for pain. 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 He has never had a seizure. We will start trial of wellbutrin to address depression. Patient prefers IR formulation over XL. He will start with 1/2 tablet and increase as tolerated/needed to max 100 mg po BID. He denies any SI, intent, or plan.  We will follow up in 1 mo.      Previous Medication Trials:  Lexapro, Cymbalta (palpitations), Prozac, Pristiq, Effexor, Zoloft, Rexulti (dropped BP)     Subjective      Review of Systems:   Review of Systems   Constitutional:  Negative for chills, fatigue and fever.   HENT:  Negative for congestion, hearing loss, sore throat and trouble swallowing.    Eyes:  Negative for blurred vision and double vision.   Respiratory:  Negative for cough, chest tightness and shortness of breath.    Cardiovascular:  Negative for chest pain and palpitations.   Gastrointestinal:  Negative for abdominal pain, constipation, diarrhea, nausea and vomiting.   Endocrine: Negative for polydipsia and polyuria.   Genitourinary:  Negative for hematuria and urgency.   Musculoskeletal:  Negative for arthralgias.   Skin:  Negative for skin lesions and bruise.   Neurological:  Negative for dizziness, tremors, seizures and light-headedness.   Hematological:  Negative for adenopathy.     Screening Scores:   PHQ-9 : 6  CATHERINE-7 : 1    Medications:     Current Outpatient Medications:     apixaban (Eliquis) 5 MG tablet tablet, Take 1 tablet by mouth 2 (Two) Times a Day., Disp: 60 tablet, Rfl: 5    CBD oil (cannabidiol) capsule, Take  by mouth., Disp: , Rfl:     Cholecalciferol (VITAMIN D3) 125 MCG (5000 UT) capsule capsule, Take 4,000 Units by mouth Daily., Disp: , Rfl:     cloNIDine (CATAPRES) 0.1 MG tablet, Take 0.5 tablet by mouth as needed systolic blood pressure greater 160, Disp: 16 tablet, Rfl: 0    dorzolamide-timolol (COSOPT) 2-0.5 % ophthalmic solution, INSTILL 1 DROP IN BOTH EYES EVERY 12 HOURS, Disp: , Rfl:     folic acid (FOLVITE) 1 MG tablet, Take  by mouth Daily., Disp: , Rfl:     magnesium oxide (MAG-OX) 400 MG tablet, Take 1 tablet by mouth Daily., Disp: 90 tablet, Rfl: 3    methotrexate 2.5 MG tablet, Take 3 tablets by mouth 1 (One) Time Per Week., Disp: , Rfl:     Omega-3 Fatty Acids (fish oil) 1000 MG capsule capsule, Take  by mouth  "Every Night., Disp: , Rfl:     valsartan (DIOVAN) 40 MG tablet, Take 1 tablet by mouth Daily., Disp: 30 tablet, Rfl: 3    vitamin B-12 (CYANOCOBALAMIN) 1000 MCG tablet, Take 1 tablet by mouth Daily., Disp: , Rfl:     vitamin E 100 UNIT capsule, Take 4 capsules by mouth Daily., Disp: , Rfl:     Zinc 50 MG capsule, Take 50 mg by mouth Daily., Disp: , Rfl:     zinc gluconate 50 MG tablet, Take 1 tablet by mouth Daily., Disp: , Rfl:     buPROPion SR (Wellbutrin SR) 100 MG 12 hr tablet, Take 1 tablet by mouth 2 (Two) Times a Day., Disp: 60 tablet, Rfl: 3    nebivolol (Bystolic) 2.5 MG tablet, Take 1 tablet by mouth Daily., Disp: 90 tablet, Rfl: 1    Medication Considerations:  ARMANDO reviewed and appropriate.     Allergies:   Allergies   Allergen Reactions    Other Cough    Ace Inhibitors      Cough     Lipitor [Atorvastatin]      Severe depression    Paroxetine Other (See Comments)    Penicillins Hives    Diltiazem Unknown (See Comments)     Objective     Vital Signs:   Vitals:    12/03/24 1153   BP: 162/88   Pulse: 63   SpO2: 97%   Weight: 91.1 kg (200 lb 14.4 oz)   Height: 177.8 cm (70\")     Body mass index is 28.83 kg/m².     Mental Status Exam:   MENTAL STATUS EXAM   General Appearance:  Cleanly groomed and dressed  Eye Contact:  Good eye contact  Attitude:  Cooperative  Motor Activity:  Normal gait, posture  Muscle Strength:  Normal  Speech:  Normal rate, tone, volume  Language:  Spontaneous  Mood and affect:  Normal, pleasant and appropriate  Hopelessness:  Denies  Thought Process:  Logical and goal-directed  Associations/ Thought Content:  No delusions  Hallucinations:  None  Suicidal Ideations:  Not present  Homicidal Ideation:  Not present  Sensorium:  Alert  Orientation:  Person, place, time and situation  Immediate Recall, Recent, and Remote Memory:  Intact  Attention Span/ Concentration:  Good  Fund of Knowledge:  Appropriate for age and educational level  Intellectual Functioning:  Average range  Insight:  " Fair  Judgement:  Fair  Reliability:  Fair  Impulse Control:  Fair      SUICIDE RISK ASSESSMENT/CSSRS:  1. Does patient have thoughts of suicide? denies  2. Does patient have intent for suicide? denies  3. Does patient have a current plan for suicide? denies  4. History of suicide attempts: denies  5. Family history of suicide or attempts: denies  6. History of violent behaviors towards others or property or thoughts of committing suicide: denies  7. History of sexual aggression toward others: denies  8. Access to firearms or weapons: denies    Assessment / Plan      Visit Diagnosis/Orders Placed This Visit:  Diagnoses and all orders for this visit:  Assessment & Plan  1. Depression.  He reports experiencing fatigue, low energy, and depressive symptoms, including feeling sad and crying. Wellbutrin is recommended. He is advised to consult with his cardiologist before starting Wellbutrin due to his heart conditions. Wellbutrin immediate release 100 mg tablets will be prescribed, with instructions to start with half a tablet once daily and adjust the dosage as needed, up to twice daily.    2. Anxiety.  His anxiety has been well-managed with CBD oil, and he reports no anxiety issues since starting CBD, except for a recent panic attack while driving. He is advised to continue using CBD oil as needed for anxiety management.    Follow-up  Return in 1 month for follow up.     Generalized anxiety disorder (Primary)  Adjustment Disorder with depressed mood  - Continue L-Theanine and Magnesium supplements  - Use 1/4 tablet of Ativan Prn anxiety  - Patient utilizing CBD oil for anxiety  - Start Wellbutrin SR up to 100 mg po BID  - Continue therapy   - Follow up with writer in 1 mo  Labs Reviewed : labs from 6/15/23 reviewed  Chart Reviewed      Functional Status: Mild impairment      Prognosis: Fair with Ongoing Treatment     Impression/Formulation:  Patient appeared alert and oriented. Patient is receptive to assistance with  maintaining a stable lifestyle.  Patient presents with history of     ICD-10-CM ICD-9-CM   1. Major depressive disorder, recurrent episode, moderate degree  F33.1 296.32   2. Generalized anxiety disorder  F41.1 300.02     Treatment Plan:     Patient will continue supportive psychotherapy efforts and medications as indicated. Clinic will obtain release of information for current treatment team for continuity of care as needed. Patient will contact this office, call 911 or present to the nearest emergency room should suicidal or homicidal ideations occur.  Discussed medication options and treatment plan of prescribed medication(s) as well as the risks, benefits, and potential side effects. Patient ackowledged and verbally consented to continue with current treatment plan and was educated on the importance of compliance with treatment and follow-up appointments.     Quality Measures:  Tobacco: Pipe Watkins  reports that he has never smoked. He has never been exposed to tobacco smoke. He has never used smokeless tobacco. I have educated him on the risk of diseases from using tobacco products such as cancer, COPD, and heart disease.     Follow Up:   Return in about 1 month (around 1/3/2025) for Medication Management, follow up with therapy.    Short-term goals: Patient will adhere to medication regimen and experience continued improvement in symptoms over the next 3 months.   Long-term goals: Patient will adhere to medication treatment plan and report improvement in symptoms over the next 6 months    Chyna Cedeño MD  [facility]     This is electronically signed by Chyna Cedeño MD     Patient or patient representative verbalized consent for the use of Ambient Listening during the visit with  Chyna Cedeño MD for chart documentation. 12/3/2024  12:03 EST

## 2024-12-17 ENCOUNTER — TELEPHONE (OUTPATIENT)
Age: 84
End: 2024-12-17
Payer: MEDICARE

## 2024-12-17 NOTE — TELEPHONE ENCOUNTER
PT CALLED ANALY THAT HE STARTED BYSTOLIC IN NOVEMBER AND THEN WELLBUTRIN A FEW WEEKS AGO.  HE STATES HE CAN'T SLEEP AT NIGHT.  HE'S NOT SURE WHICH MED IS CAUSING THIS.  HE TAKES BOTH MEDS IN THE MORNING.

## 2024-12-17 NOTE — TELEPHONE ENCOUNTER
Patient called back and clarified that he only took Bystolic the evenings of 12/11/24 and 12/12/24.  The following day, he became dizzy and felt bad all over his body, so he stopped taking it on 12/13/24.  He takes Wellbutrin at 9:00 am everyday.  He did not take Wellbutrin this morning, 12/17/24.  He did report that Wellbutrin got rid of his fatigue, which is nice.  I advised him that Dr. Cedeño will respond to this message as soon as possible, within 72 hours, and then her medical assistant will call him back to discuss.

## 2024-12-18 ENCOUNTER — OFFICE VISIT (OUTPATIENT)
Age: 84
End: 2024-12-18
Payer: MEDICARE

## 2024-12-18 VITALS
DIASTOLIC BLOOD PRESSURE: 82 MMHG | OXYGEN SATURATION: 98 % | SYSTOLIC BLOOD PRESSURE: 134 MMHG | HEIGHT: 70 IN | HEART RATE: 72 BPM | WEIGHT: 197 LBS | BODY MASS INDEX: 28.2 KG/M2

## 2024-12-18 DIAGNOSIS — F41.1 GENERALIZED ANXIETY DISORDER: Chronic | ICD-10-CM

## 2024-12-18 DIAGNOSIS — F33.1 MAJOR DEPRESSIVE DISORDER, RECURRENT EPISODE, MODERATE DEGREE: Primary | Chronic | ICD-10-CM

## 2024-12-18 RX ORDER — CYCLOSPORINE 0.5 MG/ML
EMULSION OPHTHALMIC
COMMUNITY
Start: 2024-11-26

## 2024-12-18 RX ORDER — BUPROPION HYDROCHLORIDE 75 MG/1
75 TABLET ORAL 2 TIMES DAILY
Qty: 60 TABLET | Refills: 1 | Status: SHIPPED | OUTPATIENT
Start: 2024-12-18

## 2024-12-18 NOTE — PROGRESS NOTES
"      [facility]             Follow Up Office Visit      Patient Name: Pipe Watkins  : 1940   MRN: [id number]     Referring Provider: Teddy Paredes MD    Chief Complaint:      ICD-10-CM ICD-9-CM   1. Major depressive disorder, recurrent episode, moderate degree  F33.1 296.32   2. Generalized anxiety disorder  F41.1 300.02     History of Present Illness:   Pipe Watkins is a 84 y.o. male   History of Present Illness  The patient presents for evaluation of anxiety, insomnia, and blood pressure management. He reports a positive response to Wellbutrin, describing it as a \"miracle drug.\" He experienced an enhanced sense of well-being when he halved the dose, attributing this to rapid digestion and a sudden burst of energy. He initiated a 100 mg dose on 2024, which he found beneficial in alleviating fatigue and tiredness. However, he discontinued his bystollic for two days due to anxiety. He feels as though there may be an interaction between his wellbutrin and his bystolic that makes him feel more anxious. He resumed Bystolic on 2024, as per Dr. Craft's recommendation, and had a productive session with his counselor the following day. Despite this, he experienced adverse effects after taking Bystolic on Thursday night, leading him to discontinue it on Friday. He continued Wellbutrin until Monday morning but reported excessive yawning and insomnia, a side effect he had not previously encountered. This symptom persisted throughout the day on Monday. He has an appointment with his primary care physician tomorrow to discuss the yawning issue and ensure his heart and lungs are functioning properly before his upcoming trip in 2025. He typically takes 10 mg of melatonin at night and also takes magnesium. He has been waking early, typically between 5-6am. He practices breathing exercises to manage his cortisol levels and in attempt to fall back asleep.  We " discussed changes that we can make to his management.  We will prescribe shorter-acting Wellbutrin today.  We will start at 75 mg as this is the lowest dose able to prescribe.  Patient can break this in half and increase to a full tablet or multiple times a day as tolerated/needed.  Overall, he feels as though he is doing okay.  He denies any suicidal ideation, plan, intent.  We will follow-up in 1 month.     Previous Medication Trials:  Lexapro, Cymbalta (palpitations), Prozac, Pristiq, Effexor, Zoloft, Rexulti (dropped BP)     Subjective      Review of Systems:   Review of Systems   Constitutional:  Negative for chills, fatigue and fever.   HENT:  Negative for congestion, hearing loss, sore throat and trouble swallowing.    Eyes:  Negative for blurred vision and double vision.   Respiratory:  Negative for cough, chest tightness and shortness of breath.    Cardiovascular:  Negative for chest pain and palpitations.   Gastrointestinal:  Negative for abdominal pain, constipation, diarrhea, nausea and vomiting.   Endocrine: Negative for polydipsia and polyuria.   Genitourinary:  Negative for hematuria and urgency.   Musculoskeletal:  Negative for arthralgias.   Skin:  Negative for skin lesions and bruise.   Neurological:  Negative for dizziness, tremors, seizures and light-headedness.   Hematological:  Negative for adenopathy.     Screening Scores:   PHQ-9 : 2  CATHERINE-7 : 4    Medications:     Current Outpatient Medications:     apixaban (Eliquis) 5 MG tablet tablet, Take 1 tablet by mouth 2 (Two) Times a Day., Disp: 60 tablet, Rfl: 5    CBD oil (cannabidiol) capsule, Take  by mouth., Disp: , Rfl:     Cholecalciferol (VITAMIN D3) 125 MCG (5000 UT) capsule capsule, Take 4,000 Units by mouth Daily., Disp: , Rfl:     cloNIDine (CATAPRES) 0.1 MG tablet, Take 0.5 tablet by mouth as needed systolic blood pressure greater 160, Disp: 16 tablet, Rfl: 0    cycloSPORINE (RESTASIS) 0.05 % ophthalmic emulsion, , Disp: , Rfl:      "dorzolamide-timolol (COSOPT) 2-0.5 % ophthalmic solution, INSTILL 1 DROP IN BOTH EYES EVERY 12 HOURS, Disp: , Rfl:     folic acid (FOLVITE) 1 MG tablet, Take  by mouth Daily., Disp: , Rfl:     magnesium oxide (MAG-OX) 400 MG tablet, Take 1 tablet by mouth Daily., Disp: 90 tablet, Rfl: 3    methotrexate 2.5 MG tablet, Take 3 tablets by mouth 1 (One) Time Per Week., Disp: , Rfl:     nebivolol (Bystolic) 2.5 MG tablet, Take 1 tablet by mouth Daily., Disp: 90 tablet, Rfl: 1    Omega-3 Fatty Acids (fish oil) 1000 MG capsule capsule, Take  by mouth Every Night., Disp: , Rfl:     valsartan (DIOVAN) 40 MG tablet, Take 1 tablet by mouth Daily., Disp: 30 tablet, Rfl: 3    vitamin B-12 (CYANOCOBALAMIN) 1000 MCG tablet, Take 1 tablet by mouth Daily., Disp: , Rfl:     vitamin E 100 UNIT capsule, Take 4 capsules by mouth Daily., Disp: , Rfl:     Zinc 50 MG capsule, Take 50 mg by mouth Daily., Disp: , Rfl:     zinc gluconate 50 MG tablet, Take 1 tablet by mouth Daily., Disp: , Rfl:     buPROPion (WELLBUTRIN) 75 MG tablet, Take 1 tablet by mouth 2 (Two) Times a Day., Disp: 60 tablet, Rfl: 1    Medication Considerations:  ARMANDO reviewed and appropriate.     Allergies:   Allergies   Allergen Reactions    Other Cough    Ace Inhibitors      Cough     Lipitor [Atorvastatin]      Severe depression    Paroxetine Other (See Comments)    Penicillins Hives    Diltiazem Unknown (See Comments)     Objective     Vital Signs:   Vitals:    12/18/24 1418   BP: 134/82   Pulse: 72   SpO2: 98%   Weight: 89.4 kg (197 lb)   Height: 177.8 cm (70\")     Body mass index is 28.27 kg/m².     Mental Status Exam:   MENTAL STATUS EXAM   General Appearance:  Cleanly groomed and dressed  Eye Contact:  Good eye contact  Attitude:  Cooperative  Motor Activity:  Normal gait, posture  Muscle Strength:  Normal  Speech:  Normal rate, tone, volume  Language:  Spontaneous  Mood and affect:  Normal, pleasant and appropriate  Hopelessness:  Denies  Thought Process:  " Logical and goal-directed  Associations/ Thought Content:  No delusions  Hallucinations:  None  Suicidal Ideations:  Not present  Homicidal Ideation:  Not present  Sensorium:  Alert  Orientation:  Person, place, time and situation  Immediate Recall, Recent, and Remote Memory:  Intact  Attention Span/ Concentration:  Good  Fund of Knowledge:  Appropriate for age and educational level  Intellectual Functioning:  Average range  Insight:  Fair  Judgement:  Fair  Reliability:  Fair  Impulse Control:  Fair      SUICIDE RISK ASSESSMENT/CSSRS:  1. Does patient have thoughts of suicide? denies  2. Does patient have intent for suicide? denies  3. Does patient have a current plan for suicide? denies  4. History of suicide attempts: denies  5. Family history of suicide or attempts: denies  6. History of violent behaviors towards others or property or thoughts of committing suicide: denies  7. History of sexual aggression toward others: denies  8. Access to firearms or weapons: denies    Assessment / Plan      Visit Diagnosis/Orders Placed This Visit:  Diagnoses and all orders for this visit:  Assessment & Plan  1. Anxiety.  He reports experiencing anxiety, particularly when visiting the doctor. He has been taking Wellbutrin but stopped due to insomnia and a yawning sensation. It was discussed that insomnia is a common side effect of Wellbutrin. He will be switched to a shorter-acting formulation of Wellbutrin, starting with a 75 mg tablet. He is advised to begin with half a tablet and increase to a full tablet if tolerated. If he does not experience sufficient benefit throughout the day, he may consider taking an additional half tablet in the afternoon. He is cautioned that the afternoon dose may interfere with his sleep.     2. Insomnia.  He reports difficulty sleeping, which led to discontinuing Wellbutrin. It was discussed that early awakenings are common with aging. He is advised to maintain a consistent sleep schedule and  avoid stimulating activities before bedtime. He currently takes 10 mg of melatonin and magnesium, which should help with sleep. He is also encouraged to practice relaxation techniques such as deep breathing exercises to help manage anxiety-related sleep disturbances.    3. Blood pressure management.  He reported elevated blood pressure readings earlier in the day, which normalized after taking half a clonidine tablet. He is advised to continue monitoring his blood pressure and use clonidine as needed for acute management.     Generalized anxiety disorder (Primary)  Adjustment Disorder with depressed mood  - Continue L-Theanine and Magnesium supplements  - Use 1/4 tablet of Ativan Prn anxiety  - Patient utilizing CBD oil for anxiety  - Start Wellbutrin up to 75 mg po BID  - Continue therapy   - Follow up with writer in 1 mo  Labs Reviewed : labs from 6/15/23 reviewed  Chart Reviewed      Functional Status: Mild impairment      Prognosis: Fair with Ongoing Treatment     Impression/Formulation:  Patient appeared alert and oriented. Patient is receptive to assistance with maintaining a stable lifestyle.  Patient presents with history of     ICD-10-CM ICD-9-CM   1. Major depressive disorder, recurrent episode, moderate degree  F33.1 296.32   2. Generalized anxiety disorder  F41.1 300.02     Treatment Plan:     Patient will continue supportive psychotherapy efforts and medications as indicated. Clinic will obtain release of information for current treatment team for continuity of care as needed. Patient will contact this office, call 911 or present to the nearest emergency room should suicidal or homicidal ideations occur.  Discussed medication options and treatment plan of prescribed medication(s) as well as the risks, benefits, and potential side effects. Patient ackowledged and verbally consented to continue with current treatment plan and was educated on the importance of compliance with treatment and follow-up  appointments.     Quality Measures:  Tobacco: Pipe Watkins  reports that he has never smoked. He has never been exposed to tobacco smoke. He has never used smokeless tobacco. I have educated him on the risk of diseases from using tobacco products such as cancer, COPD, and heart disease.     Follow Up:   Return in about 1 month (around 1/18/2025) for Medication Management, follow up with therapy.    Short-term goals: Patient will adhere to medication regimen and experience continued improvement in symptoms over the next 3 months.   Long-term goals: Patient will adhere to medication treatment plan and report improvement in symptoms over the next 6 months    Chyna Cedeño MD  [facility]     This is electronically signed by Chyna Cedeño MD     Patient or patient representative verbalized consent for the use of Ambient Listening during the visit with  Chyna Cedeño MD for chart documentation. 12/18/2024  12:03 EST

## 2024-12-19 ENCOUNTER — OFFICE VISIT (OUTPATIENT)
Dept: FAMILY MEDICINE CLINIC | Facility: CLINIC | Age: 84
End: 2024-12-19
Payer: MEDICARE

## 2024-12-19 VITALS
HEIGHT: 70 IN | OXYGEN SATURATION: 99 % | WEIGHT: 201 LBS | SYSTOLIC BLOOD PRESSURE: 148 MMHG | HEART RATE: 84 BPM | DIASTOLIC BLOOD PRESSURE: 86 MMHG | BODY MASS INDEX: 28.77 KG/M2

## 2024-12-19 DIAGNOSIS — R53.83 OTHER FATIGUE: ICD-10-CM

## 2024-12-19 DIAGNOSIS — F41.9 ANXIETY: ICD-10-CM

## 2024-12-19 DIAGNOSIS — I10 ESSENTIAL HYPERTENSION: Primary | ICD-10-CM

## 2024-12-19 DIAGNOSIS — R06.89 YAWNING: ICD-10-CM

## 2024-12-19 PROCEDURE — 3077F SYST BP >= 140 MM HG: CPT | Performed by: INTERNAL MEDICINE

## 2024-12-19 PROCEDURE — 3079F DIAST BP 80-89 MM HG: CPT | Performed by: INTERNAL MEDICINE

## 2024-12-19 PROCEDURE — 99214 OFFICE O/P EST MOD 30 MIN: CPT | Performed by: INTERNAL MEDICINE

## 2024-12-19 RX ORDER — VALSARTAN 40 MG/1
40 TABLET ORAL 2 TIMES DAILY
Start: 2024-12-19 | End: 2024-12-19 | Stop reason: SDUPTHER

## 2024-12-19 RX ORDER — VALSARTAN 40 MG/1
40 TABLET ORAL 2 TIMES DAILY
Qty: 180 TABLET | Refills: 2
Start: 2024-12-19 | End: 2024-12-20 | Stop reason: SDUPTHER

## 2024-12-19 NOTE — PROGRESS NOTES
"Pipe Watkins  1940  [id number]  Patient Care Team:  Teddy Paredes MD as PCP - General (Internal Medicine)  Parish Craft III, MD as Cardiologist (Cardiology)  Chyna Cedeño MD as Consulting Physician (Psychiatry)    Pipe Watkins is a 84 y.o. male here today for follow up.     This patient is accompanied by their self who contributes to the history of their care.    Chief Complaint:    Chief Complaint   Patient presents with    Dizziness        History of Present Illness:  I have reviewed and/or updated the patient's past medical, past surgical, family, social history, problem list and allergies as appropriate.     Wellbutrin on Dec 8th felt better with less fatigue    Resumed bystolic several days later with se of dizziness and fatigue. He then stopped bystolic  November 22 until Dec 11 took in x 2 days with reaction and stopped then-  . Awaiting to see Dr. Craft. He will not take bystolic any further- he has been taking valsartan 40 mg po bid since this time      Has experienced lots of yawning after beginning welbutrin- this concerned him.       Was changed to 75 mg bupropion recently by psych ( IR) had been initally on xl      He took his valsartan at 930 this am  125/62 at 10 pm 2 nights ago on dec 12 120/52 with bystolic)  .   He will periodically take a prn clonidine ( first was yesterday)  for sbp  of 170    He has had no further dizziness since stopping bystolic. Denies chest pain. CHAVARRIA or palpitations  Review of Systems   Constitutional:  Negative for fatigue.   Respiratory: Negative.     Cardiovascular: Negative.    Gastrointestinal: Negative.    Genitourinary: Negative.    Psychiatric/Behavioral:  The patient is nervous/anxious.        Vitals:    12/19/24 1057 12/19/24 1134   BP: (!) 184/102 148/86   BP Location:  Right arm   Pulse: 84    SpO2: 99%    Weight: 91.2 kg (201 lb)    Height: 177.8 cm (70\")      Body mass index is 28.84 kg/m².    Physical Exam  Vitals and " nursing note reviewed.   Constitutional:       General: He is not in acute distress.     Appearance: He is well-developed. He is not diaphoretic.   HENT:      Head: Normocephalic and atraumatic.      Right Ear: External ear normal.      Left Ear: External ear normal.      Mouth/Throat:      Pharynx: No oropharyngeal exudate.   Eyes:      General: No scleral icterus.        Right eye: No discharge.      Conjunctiva/sclera: Conjunctivae normal.   Neck:      Thyroid: No thyromegaly.      Vascular: No JVD.      Trachea: No tracheal deviation.   Cardiovascular:      Rate and Rhythm: Normal rate and regular rhythm.      Heart sounds: Normal heart sounds.      Comments: PMI nondisplaced  Pulmonary:      Effort: Pulmonary effort is normal.      Breath sounds: Normal breath sounds. No wheezing or rales.   Abdominal:      General: Bowel sounds are normal.      Palpations: Abdomen is soft.      Tenderness: There is no abdominal tenderness. There is no guarding or rebound.   Musculoskeletal:      Cervical back: Normal range of motion and neck supple.   Lymphadenopathy:      Cervical: No cervical adenopathy.   Skin:     General: Skin is warm and dry.      Capillary Refill: Capillary refill takes less than 2 seconds.      Coloration: Skin is not pale.      Findings: No rash.   Neurological:      Mental Status: He is alert and oriented to person, place, and time.      Motor: No abnormal muscle tone.      Coordination: Coordination normal.   Psychiatric:         Judgment: Judgment normal.         Procedures    Results Review:    None    Assessment/Plan:    Problem List Items Addressed This Visit       Essential hypertension - Primary    Current Assessment & Plan      labile. I suspect a lot of of psychogenic component with significant anxiety about health and mortality.  He will continue valsartan 40 mg p.o. twice daily with as needed clonidine.  He will no longer be taking Bystolic secondary t to reaction..  Can consider cardiazem  going forward.         Relevant Medications    cloNIDine (CATAPRES) 0.1 MG tablet    valsartan (DIOVAN) 40 MG tablet    Anxiety    Other fatigue    Current Assessment & Plan     This is improved with bupropion.  Would follow blood pressure carefully off of this medication.         Yawning    Current Assessment & Plan     Time course is suggestive of initiation of bupropion therapy.  Fully 11 to 12% of patients initiated Wellbutrin will experience this side effect.  He will  likely acclimate to this.  Reassurance offered.            Plan of care reviewed with patient at the conclusion of today's visit. Education was provided regarding diagnosis and management.  Patient verbalizes understanding of and agreement with management plan.    No follow-ups on file.    Teddy Paredes MD      Please note than portions of this note were completed [facility] a Voice Recognition Program

## 2024-12-19 NOTE — ASSESSMENT & PLAN NOTE
Time course is suggestive of initiation of bupropion therapy.  Fully 11 to 12% of patients initiated Wellbutrin will experience this side effect.  He will  likely acclimate to this.  Reassurance offered.

## 2024-12-19 NOTE — ASSESSMENT & PLAN NOTE
labile. I suspect a lot of of psychogenic component with significant anxiety about health and mortality.  He will continue valsartan 40 mg p.o. twice daily with as needed clonidine.  He will no longer be taking Bystolic secondary t to reaction..  Can consider cardiakila going forward.

## 2024-12-20 RX ORDER — VALSARTAN 40 MG/1
40 TABLET ORAL 2 TIMES DAILY
Qty: 90 TABLET | Refills: 1 | Status: SHIPPED | OUTPATIENT
Start: 2024-12-20 | End: 2024-12-23 | Stop reason: SDUPTHER

## 2024-12-23 ENCOUNTER — TELEPHONE (OUTPATIENT)
Dept: FAMILY MEDICINE CLINIC | Facility: CLINIC | Age: 84
End: 2024-12-23
Payer: MEDICARE

## 2024-12-23 RX ORDER — VALSARTAN 40 MG/1
40 TABLET ORAL 2 TIMES DAILY
Qty: 90 TABLET | Refills: 1 | Status: SHIPPED | OUTPATIENT
Start: 2024-12-23

## 2024-12-23 NOTE — TELEPHONE ENCOUNTER
"Caller: Pipe Watkins \"Rogelio\"    Relationship: Self    Best call back number: [phone]    Which medication are you concerned about: VALSARTAN    Who prescribed you this medication: DR HOLMAN    What are your concerns: PATIENT STATES PHARMACY NEVER RECEIVED REFILL THAT WAS SENT ON 12/20/2024. PLEASE SEND AGAIN.      "

## 2025-02-20 DIAGNOSIS — K57.32 SIGMOID DIVERTICULITIS: Primary | ICD-10-CM

## 2025-02-25 ENCOUNTER — TELEPHONE (OUTPATIENT)
Dept: FAMILY MEDICINE CLINIC | Facility: CLINIC | Age: 85
End: 2025-02-25
Payer: MEDICARE

## 2025-02-25 NOTE — TELEPHONE ENCOUNTER
"  Caller: Pipe Watkins \"Rogelio\"    Relationship: Self    Best call back number: [phone]     What was the call regarding: PATIENT CALLED AND STATES THAT HE DOESN'T NEEDS ANY CHANGE TO HIS REFERRAL. HE WILL SCHEDULE WITH THE [facility].    Is it okay if the provider responds through MyChart: NO  "

## 2025-03-13 ENCOUNTER — TELEPHONE (OUTPATIENT)
Dept: CARDIOLOGY | Facility: CLINIC | Age: 85
End: 2025-03-13

## 2025-03-13 NOTE — TELEPHONE ENCOUNTER
"  Caller: Pipe Watkins \"Rogelio\"    Relationship to patient: Self    Best call back number: [phone]    New or established patient?  [] New  [x] Established    Date of discharge: 1/30/25    Facility discharged from: [facility]    Diagnosis/Symptoms: SVT    Length of stay (If applicable): 5 DAYS    Specialty Only: Did you see a [facility] health provider?    [] Yes  [] No  If so, who?     Additional Details: PT TO FU WITH CARDIOLOGIST. PLEASE CALL BACK TO ADVISE, THANK YOU.      "

## 2025-04-03 ENCOUNTER — TRANSCRIBE ORDERS (OUTPATIENT)
Dept: ADMINISTRATIVE | Facility: HOSPITAL | Age: 85
End: 2025-04-03
Payer: MEDICARE

## 2025-04-03 ENCOUNTER — TELEPHONE (OUTPATIENT)
Dept: CARDIOLOGY | Facility: CLINIC | Age: 85
End: 2025-04-03
Payer: MEDICARE

## 2025-04-03 DIAGNOSIS — R93.3 ABNORMAL CT SCAN, COLON: Primary | ICD-10-CM

## 2025-04-03 NOTE — TELEPHONE ENCOUNTER
Scheduled to have a Colonoscopy next Wednesday with . Requesting to hold his Eliquis 2 days prior to the procedure. Please advise.

## 2025-04-16 ENCOUNTER — OFFICE VISIT (OUTPATIENT)
Dept: CARDIOLOGY | Facility: CLINIC | Age: 85
End: 2025-04-16
Payer: MEDICARE

## 2025-04-16 VITALS
OXYGEN SATURATION: 98 % | SYSTOLIC BLOOD PRESSURE: 178 MMHG | WEIGHT: 192 LBS | DIASTOLIC BLOOD PRESSURE: 92 MMHG | HEART RATE: 70 BPM | BODY MASS INDEX: 27.49 KG/M2 | HEIGHT: 70 IN

## 2025-04-16 DIAGNOSIS — I47.19 AVNRT (AV NODAL RE-ENTRY TACHYCARDIA): Primary | ICD-10-CM

## 2025-04-16 DIAGNOSIS — E78.2 MIXED HYPERLIPIDEMIA: ICD-10-CM

## 2025-04-16 DIAGNOSIS — I48.0 PAROXYSMAL ATRIAL FIBRILLATION: ICD-10-CM

## 2025-04-16 DIAGNOSIS — I10 ESSENTIAL HYPERTENSION: ICD-10-CM

## 2025-04-16 RX ORDER — BUPROPION HYDROCHLORIDE 100 MG/1
100 TABLET, EXTENDED RELEASE ORAL DAILY
COMMUNITY

## 2025-04-16 RX ORDER — NEBIVOLOL 2.5 MG/1
2.5 TABLET ORAL NIGHTLY
COMMUNITY
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
## 2025-04-28 ENCOUNTER — TELEPHONE (OUTPATIENT)
Dept: CARDIOLOGY | Facility: CLINIC | Age: 85
End: 2025-04-28
Payer: MEDICARE

## 2025-04-28 NOTE — TELEPHONE ENCOUNTER
called asking to change the date of his next remote reading. He states  he was just seen by Dr Craft on 4/16/2025 and had his device checked. He stated he did not want to pay another $30.00 for redundant information. After explaining to him that due to his battery only having 4 months until GUCCI, we would be checking him monthly but he would not be charged monthly. He was agreeable with the plan.

## 2025-05-01 ENCOUNTER — OFFICE VISIT (OUTPATIENT)
Dept: FAMILY MEDICINE CLINIC | Facility: CLINIC | Age: 85
End: 2025-05-01
Payer: MEDICARE

## 2025-05-01 VITALS
OXYGEN SATURATION: 98 % | HEART RATE: 67 BPM | WEIGHT: 192.6 LBS | HEIGHT: 70 IN | DIASTOLIC BLOOD PRESSURE: 92 MMHG | BODY MASS INDEX: 27.57 KG/M2 | SYSTOLIC BLOOD PRESSURE: 142 MMHG

## 2025-05-01 DIAGNOSIS — F41.9 ANXIETY: Primary | ICD-10-CM

## 2025-05-01 DIAGNOSIS — K57.90 DIVERTICULOSIS: ICD-10-CM

## 2025-05-01 DIAGNOSIS — I10 WHITE COAT SYNDROME WITH DIAGNOSIS OF HYPERTENSION: ICD-10-CM

## 2025-05-01 DIAGNOSIS — L40.50 PSORIATIC ARTHRITIS: ICD-10-CM

## 2025-05-01 PROBLEM — M19.90 OSTEOARTHRITIS: Status: RESOLVED | Noted: 2021-06-28 | Resolved: 2025-05-01

## 2025-05-01 PROBLEM — Z00.00 MEDICARE ANNUAL WELLNESS VISIT, SUBSEQUENT: Status: RESOLVED | Noted: 2023-09-14 | Resolved: 2025-05-01

## 2025-05-01 PROCEDURE — 99214 OFFICE O/P EST MOD 30 MIN: CPT | Performed by: INTERNAL MEDICINE

## 2025-05-01 PROCEDURE — 3077F SYST BP >= 140 MM HG: CPT | Performed by: INTERNAL MEDICINE

## 2025-05-01 PROCEDURE — 3080F DIAST BP >= 90 MM HG: CPT | Performed by: INTERNAL MEDICINE

## 2025-05-01 NOTE — ASSESSMENT & PLAN NOTE
Doxil improved on Wellbutrin.  Intolerant of most SSRIs.  He sees psychiatry tomorrow.  Continue Wellbutrin

## 2025-05-01 NOTE — PROGRESS NOTES
Pipe Watkins  1940  [id number]  Patient Care Team:  Teddy Paredes MD as PCP - General (Internal Medicine)  Parish Craft III, MD as Cardiologist (Cardiology)  Chyna Cedeño MD as Consulting Physician (Psychiatry)    Pipe Watkins is a 84 y.o. male here today for follow up.     This patient is accompanied by their self who contributes to the history of their care.    Chief Complaint:    Chief Complaint   Patient presents with    Hypertension        History of Present Illness:  I have reviewed and/or updated the patient's past medical, past surgical, family, social history, problem list and allergies as appropriate.     Follow-up hypertension.  Recently evaluated while in [place] for diverticulitis. . Reports some transient labile blood pressures which are stabilizing   While visiting his home in [place] he ended up inpatient hospitalization with acute sigmoid diverticulitis- complicated by 9 beat run of NSVT.   Dr. Craft of cardiology.  No changes made.  He completed antibiotic therapy, he has since seen Dr. Benny Mann and underwent colonoscopy.  Records of colonoscopy and colorectal records have been reviewed.  While at cardiology was 170/92.  Typically elevated secondary to anxiety during doctors appointments. He clearly had WC HTN. At home his bps run.  ( Pre med 135/78 at  home)    Since recovered from diverticulitis- he has felt  very well. H ehas stopped citrucel after 2 days. He typically is regular daily.     He continues Wellbutrin for anxiety     Review of Systems   Constitutional:  Negative for fever.   Gastrointestinal:  Positive for vomiting. Negative for nausea.   Genitourinary:  Negative for difficulty urinating, dysuria, flank pain, hematuria and urgency.   Musculoskeletal:  Negative for back pain.   All other systems reviewed and are negative.      Vitals:    05/01/25 1048 05/01/25 1132   BP: (!) 192/100 142/92   Pulse: 67    SpO2: 98%    Weight: 87.4 kg (192  "lb 9.6 oz)    Height: 177.8 cm (70\")      Body mass index is 27.64 kg/m².    Physical Exam  HENT:      Head: Normocephalic and atraumatic.   Eyes:      Conjunctiva/sclera: Conjunctivae normal.   Cardiovascular:      Rate and Rhythm: Normal rate and regular rhythm.   Pulmonary:      Effort: Pulmonary effort is normal.      Breath sounds: Normal breath sounds.   Abdominal:      General: Bowel sounds are normal.      Palpations: Abdomen is soft.   Genitourinary:     Comments: No CVA tendernesss   Skin:     General: Skin is warm and dry.   Neurological:      Mental Status: He is alert and oriented to person, place, and time.         Procedures    Results Review:    None    Assessment/Plan:    Problem List Items Addressed This Visit       White coat syndrome with diagnosis of hypertension    Current Assessment & Plan    blood pressures will control at home.  He has documented whitecoat hypertension.  He will continue to monitor at home.  He is very attentive to this.         Relevant Medications    cloNIDine (CATAPRES) 0.1 MG tablet    valsartan (DIOVAN) 40 MG tablet    nebivolol (BYSTOLIC) 2.5 MG tablet    Anxiety - Primary    Current Assessment & Plan   Doxil improved on Wellbutrin.  Intolerant of most SSRIs.  He sees psychiatry tomorrow.  Continue Wellbutrin         Psoriatic arthritis    Diverticulosis    Current Assessment & Plan   Recent tach gnosis of acute diverticulitis.  Resolved after antibiotic treatment.  Status post colonoscopy.  Dicussed strategies to strategies to avoid constipation.            Plan of care reviewed with patient at the conclusion of today's visit. Education was provided regarding diagnosis and management.  Patient verbalizes understanding of and agreement with management plan.    Return in about 6 months (around 11/1/2025) for Medicare Wellness.    Teddy Paredes MD      Please note than portions of this note were completed wt a Voice Recognition Program          "

## 2025-05-01 NOTE — ASSESSMENT & PLAN NOTE
Recent tach gnosis of acute diverticulitis.  Resolved after antibiotic treatment.  Status post colonoscopy.  Dicussed strategies to strategies to avoid constipation.

## 2025-05-01 NOTE — ASSESSMENT & PLAN NOTE
blood pressures will control at home.  He has documented whitecoat hypertension.  He will continue to monitor at home.  He is very attentive to this.

## 2025-05-02 ENCOUNTER — OFFICE VISIT (OUTPATIENT)
Age: 85
End: 2025-05-02
Payer: MEDICARE

## 2025-05-02 VITALS
OXYGEN SATURATION: 97 % | HEART RATE: 66 BPM | HEIGHT: 70 IN | SYSTOLIC BLOOD PRESSURE: 144 MMHG | WEIGHT: 194.7 LBS | BODY MASS INDEX: 27.87 KG/M2 | DIASTOLIC BLOOD PRESSURE: 88 MMHG

## 2025-05-02 DIAGNOSIS — F33.41 MDD (MAJOR DEPRESSIVE DISORDER), RECURRENT, IN PARTIAL REMISSION: ICD-10-CM

## 2025-05-02 DIAGNOSIS — F41.1 GENERALIZED ANXIETY DISORDER: Primary | ICD-10-CM

## 2025-05-02 RX ORDER — BUPROPION HYDROCHLORIDE 100 MG/1
100 TABLET, EXTENDED RELEASE ORAL DAILY
Qty: 30 TABLET | Refills: 0 | Status: SHIPPED | OUTPATIENT
Start: 2025-05-02 | End: 2025-06-01

## 2025-05-02 NOTE — PROGRESS NOTES
[facility]    Follow Up Office Visit      Date: 2025   Patient Name: Pipe Watkins  : 1940   MRN: [id number]     Referring Provider: Teddy Paredes MD    The patient was extremely hesitant about having a chaperone initially and for 10 minutes asked questions why this provider would need one and if he was safe to continue to have a session with this provider.  This provider made clear that he could reschedule with another provider if he felt he did not want to have a chaperone.   After answering all of his questions, he agreed to go through with the appointment in the presence of the chaperone.    Chaperone Statement: Martha TOYA served as a chaperone for this visit and was present for the full visit from 14:15 to 14:30. Patient agreeable to chaperone presence during appointment.   Chief Complaint:        ICD-10-CM ICD-9-CM   1. Generalized anxiety disorder  F41.1 300.02   2. MDD (major depressive disorder), recurrent, in partial remission  F33.41 296.35        History of Present Illness:   Pipe Watkins is a 84 y.o. male who is here for follow up with medication management.  This provider is covering for Dr. Cedeño who is on maternity leave.      History of Present Illness  The patient is an 84-year-old male who presents for evaluation of anxiety.    The chief complaint is anxiety, which has been a persistent issue for several years. He attributes his anxiety to his upbringing in an alcoholic family. Despite his resolve to avoid alcohol, he eventually succumbed to heavy drinking, consuming vodka daily. However, he has maintained sobriety for over 22 years.     He has been under the care of Dr. Cedeño for approximately a year, during which various treatments were attempted. Initially, Zoloft was prescribed at 25 mg, later increased to 50 mg, and then to 100 mg. The increase in dosage led to a significant rise in blood pressure, necessitating  hospitalization. The cause was identified as Zoloft, a serotonin reuptake inhibitor. Subsequently, Wellbutrin 100 SR was prescribed, which he started taking in 01/2025. This medication has been effective in alleviating fatigue, but anxiety persists. He is considering increasing or decreasing his Wellbutrin dosage as he believes the current dosage may be contributing to his anxiety.     He has been seeing a counselor for the past few years, which has been beneficial. He also practices breathing exercises and vagus nerve massage to manage anxiety. No panic attacks have been experienced since starting Wellbutrin. Ativan has been prescribed as needed, but it induces sleep, so only a quarter of a tablet is taken.    White coat syndrome is reported, with blood pressure readings fluctuating between 144/80 and 131/72. A previous episode where blood pressure exceeded 200 was recalled, but it decreased to 140/85 by the end of the consultation. The current medication regimen includes Bystolic 2.5 mg at night and valsartan 140 mg in the morning.    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    FAMILY HISTORY  The patient grew up in an alcoholic family.        Subjective     Screening Scores:   PHQ-9 : 0  CATHERINE-7 : 4    Medications:     Current Outpatient Medications:     apixaban (Eliquis) 5 MG tablet tablet,  Take 1 tablet by mouth 2 (Two) Times a Day., Disp: 60 tablet, Rfl: 5    buPROPion SR (WELLBUTRIN SR) 100 MG 12 hr tablet, Take 1 tablet by mouth Daily for 30 days., Disp: 30 tablet, Rfl: 0    CBD (cannabidiol) oral oil, Take  by mouth., Disp: , Rfl:     Cholecalciferol (VITAMIN D3) 125 MCG (5000 UT) capsule capsule, Take 4,000 Units by mouth Daily., Disp: , Rfl:     cloNIDine (CATAPRES) 0.1 MG tablet, Take 0.5 tablet by mouth as needed systolic blood pressure greater 160, Disp: 16 tablet, Rfl: 0    cycloSPORINE (RESTASIS) 0.05 % ophthalmic emulsion, , Disp: , Rfl:     folic acid (FOLVITE) 1 MG tablet, Take  by mouth Daily., Disp: , Rfl:     magnesium oxide (MAG-OX) 400 MG tablet, Take 1 tablet by mouth Daily. (Patient taking differently: Take 1 tablet by mouth Daily. PT taking Magnesium glycinate), Disp: 90 tablet, Rfl: 3    nebivolol (BYSTOLIC) 2.5 MG tablet, Take 1 tablet by mouth Every Night., Disp: , Rfl:     Omega-3 Fatty Acids (fish oil) 1000 MG capsule capsule, Take  by mouth Every Night., Disp: , Rfl:     valsartan (DIOVAN) 40 MG tablet, Take 1 tablet by mouth 2 (Two) Times a Day. (Patient taking differently: Take 1 tablet by mouth Daily.), Disp: 90 tablet, Rfl: 1    vitamin B-12 (CYANOCOBALAMIN) 1000 MCG tablet, Take 1 tablet by mouth Daily., Disp: , Rfl:     vitamin E 100 UNIT capsule, Take 4 capsules by mouth Daily., Disp: , Rfl:     Zinc 50 MG capsule, Take 50 mg by mouth Daily., Disp: , Rfl:     dorzolamide-timolol (COSOPT) 2-0.5 % ophthalmic solution, INSTILL 1 DROP IN BOTH EYES EVERY 12 HOURS (Patient not taking: Reported on 5/2/2025), Disp: , Rfl:     Medication Considerations:  ARMANDO reviewed and appropriate.     Allergies:   Allergies   Allergen Reactions    Other Cough    Ace Inhibitors      Cough     Lipitor [Atorvastatin]      Severe depression    Paroxetine Other (See Comments)    Penicillins Hives    Diltiazem Unknown (See Comments)       Objective     Vital Signs:   Vitals:    05/02/25 1406  "  BP: 144/88   Pulse: 66   SpO2: 97%   Weight: 88.3 kg (194 lb 11.2 oz)   Height: 177.8 cm (70\")     Body mass index is 27.94 kg/m².     Mental Status Exam:   MENTAL STATUS EXAM   General Appearance:  Cleanly groomed and dressed  Eye Contact:  Good eye contact  Attitude:  Guarded and uncooperative  Motor Activity:  Normal gait, posture  Muscle Strength:  Normal  Speech:  Rambling  Language:  Spontaneous  Mood and affect:  Irritable and labile  Hopelessness:  Denies  Loneliness: Denies  Thought Process:  Logical, goal-directed and linear  Associations/ Thought Content:  No delusions  Hallucinations:  None  Suicidal Ideations:  Not present  Homicidal Ideation:  Not present  Sensorium:  Alert and clear  Orientation:  Person, place, time and situation  Immediate Recall, Recent, and Remote Memory:  Intact  Attention Span/ Concentration:  Good  Fund of Knowledge:  Appropriate for age and educational level  Intellectual Functioning:  Above average  Insight:  Fair  Judgement:  Fair  Reliability:  Fair  Impulse Control:  Fair        SUICIDE RISK ASSESSMENT/CSSRS:  1. Does patient have thoughts of suicide? He denies  2. Does patient have intent for suicide? He denies  3. Does patient have a current plan for suicide? He denies  4. History of suicide attempts: he denies  5. Family history of suicide or attempts: he denies  6. History of violent behaviors towards others or property or thoughts of committing suicide: he denies  7. History of sexual aggression toward others: he denies  8. Access to firearms or weapons: he denies    Assessment / Plan      Visit Diagnosis/Orders Placed This Visit:  Diagnoses and all orders for this visit:    1. Generalized anxiety disorder (Primary)  -     buPROPion SR (WELLBUTRIN SR) 100 MG 12 hr tablet; Take 1 tablet by mouth Daily for 30 days.  Dispense: 30 tablet; Refill: 0    2. MDD (major depressive disorder), recurrent, in partial remission  -     buPROPion SR (WELLBUTRIN SR) 100 MG 12 hr " tablet; Take 1 tablet by mouth Daily for 30 days.  Dispense: 30 tablet; Refill: 0         Assessment & Plan  Problems:  - Anxiety  - Hypertension    Content of Therapy:  During the session, the patient discussed his long-standing struggle with anxiety, which he attributes to growing up in an alcoholic family. He shared his history of alcohol dependence and subsequent sobriety of over 22 years. The patient reported experiencing severe anxiety and irritability, particularly in anticipation of an upcoming trip to [place] with another couple. He also mentioned his use of Wellbutrin (bupropion) and the challenges he faced with previous medications like Zoloft (sertraline). The patient expressed concerns about the side effects of Wellbutrin, including increased irritability and elevated blood pressure. He also discussed his coping mechanisms, such as breathing exercises and meditation, and his frustration with the lack of mental health benefits through his current insurance.    Clinical Impression:  The patient presents with ongoing anxiety and irritability.  He has a history of panic attacks, which have subsided since starting Wellbutrin. However, his anxiety remains a significant concern, particularly in social situations. The patient's blood pressure readings are elevated in clinical settings but are generally within normal range at home, indicating white coat syndrome. He appears motivated to manage his anxiety through both pharmacological and non-pharmacological means.    Therapeutic Intervention:  - Cognitive-behavioral strategies were discussed to help reframe anxious thoughts and develop alternative coping mechanisms.  - Psychoeducation on the potential side effects of Wellbutrin and its impact on blood pressure.  - Encouragement to continue using mindfulness exercises and breathing techniques to manage anxiety.    Plan:  - Patient wanted to continue the Wellbutrin at 100 mg daily as he determined that the benefits  outweighed the risks.  - Incorporate alternative coping strategies such as meditation and muscle relaxation exercises.  - Utilize private settings, such as a restroom, to practice coping techniques if anxiety becomes overwhelming in social situations.  - Monitor blood pressure regularly at home and report any significant changes.    Follow-up:  - Follow up with Dr. Cedeño upon her return in July.  - Schedule a follow-up appointment to reassess anxiety levels and the effectiveness of current interventions.    Notes & Risk Factors:  - Risk factors: History of alcohol dependence, elevated blood pressure in clinical settings.  - Protective factors: Long-term sobriety, use of coping mechanisms, supportive spouse.       Labs Reviewed : labs reviewed  Chart since last visit reviewed : chart reviewed    Results         Functional Status: No impairment    Prognosis: Guarded with Ongoing Treatment    Impression/Formulation:  Patient appeared alert and oriented. Patient is receptive to assistance with maintaining a stable lifestyle.  Patient presents with history of     ICD-10-CM ICD-9-CM   1. Generalized anxiety disorder  F41.1 300.02   2. MDD (major depressive disorder), recurrent, in partial remission  F33.41 296.35   .     Treatment Plan:     Patient will continue supportive efforts and medications as indicated. Clinic will obtain release of information for current treatment team for continuity of care as needed. Patient will contact this office, call 911 or present to the nearest emergency room should suicidal or homicidal ideations occur.  Discussed medication options and treatment plan of prescribed medication(s) as well as the risks, benefits, and potential side effects. Patient acknowledged and verbally consented to continue with current treatment plan and was educated on the importance of compliance with treatment and follow-up appointments.     Benzo: Dr. Cedeño wrote a prescription for lorazepam back on 5/2024 for a 20  day supply.  He reports that the lorazepam was too sedating to use for anxiety.      Quality Measures:  Tobacco: Pipe Watkins  reports that he has never smoked. He has never been exposed to tobacco smoke. He has never used smokeless tobacco. I have educated him on the risk of diseases from using tobacco products such as cancer, COPD, heart disease, and he is a non-smoker .   I spent  1  minute counseling the patient.          Depression (PHQ >11): Patient screened negative for depression based on a PHQ-9 score of 0 on 5/2/2025. Follow-up recommendations include: Suicide Risk Assessment performed and Continue with medication management.       Follow Up:   Return if symptoms worsen or fail to improve.    Short-term goals: Patient will adhere to medication regimen and experience continued improvement in symptoms over the next 3 months.   Long-term goals: Patient will adhere to medication treatment plan and report improvement in symptoms over the next 6 months    Patient or patient representative verbalized consent for the use of Ambient Listening during the visit with  Magdiel Grover MD for chart documentation. 5/2/2025  14:15 EDT    Magdiel Grover MD  [facility]     This is electronically signed by Magdiel Grover MD  05/02/2025 14:18 EDT

## 2025-07-16 ENCOUNTER — TELEPHONE (OUTPATIENT)
Dept: CARDIOLOGY | Facility: CLINIC | Age: 85
End: 2025-07-16
Payer: MEDICARE

## 2025-07-16 RX ORDER — NEBIVOLOL 2.5 MG/1
2.5 TABLET ORAL NIGHTLY
Qty: 90 TABLET | Refills: 1 | Status: SHIPPED | OUTPATIENT
Start: 2025-07-16

## 2025-07-18 LAB
MC_CV_MDC_IDC_RATE_1: 150
MC_CV_MDC_IDC_RATE_1: 162
MC_CV_MDC_IDC_THERAPIES: NORMAL
MC_CV_MDC_IDC_ZONE_ID: 2
MC_CV_MDC_IDC_ZONE_ID: 6
MDC_IDC_MSMT_BATTERY_REMAINING_LONGEVITY: 2 MO
MDC_IDC_MSMT_BATTERY_RRT_TRIGGER: 2.83
MDC_IDC_MSMT_BATTERY_STATUS: NORMAL
MDC_IDC_MSMT_BATTERY_VOLTAGE: 2.84
MDC_IDC_MSMT_LEADCHNL_RA_DTM: NORMAL
MDC_IDC_MSMT_LEADCHNL_RA_IMPEDANCE_VALUE: 418
MDC_IDC_MSMT_LEADCHNL_RA_PACING_THRESHOLD_AMPLITUDE: 0.62
MDC_IDC_MSMT_LEADCHNL_RA_PACING_THRESHOLD_POLARITY: NORMAL
MDC_IDC_MSMT_LEADCHNL_RA_PACING_THRESHOLD_PULSEWIDTH: 0.4
MDC_IDC_MSMT_LEADCHNL_RA_SENSING_INTR_AMPL: 2.12
MDC_IDC_MSMT_LEADCHNL_RV_DTM: NORMAL
MDC_IDC_MSMT_LEADCHNL_RV_IMPEDANCE_VALUE: 513
MDC_IDC_MSMT_LEADCHNL_RV_PACING_THRESHOLD_AMPLITUDE: 0.75
MDC_IDC_MSMT_LEADCHNL_RV_PACING_THRESHOLD_POLARITY: NORMAL
MDC_IDC_MSMT_LEADCHNL_RV_PACING_THRESHOLD_PULSEWIDTH: 0.4
MDC_IDC_MSMT_LEADCHNL_RV_SENSING_INTR_AMPL: 4.25
MDC_IDC_PG_IMPLANT_DTM: NORMAL
MDC_IDC_PG_MFG: NORMAL
MDC_IDC_PG_MODEL: NORMAL
MDC_IDC_PG_SERIAL: NORMAL
MDC_IDC_PG_TYPE: NORMAL
MDC_IDC_SESS_DTM: NORMAL
MDC_IDC_SESS_TYPE: NORMAL
MDC_IDC_SET_BRADY_AT_MODE_SWITCH_RATE: 162
MDC_IDC_SET_BRADY_LOWRATE: 60
MDC_IDC_SET_BRADY_MAX_SENSOR_RATE: 130
MDC_IDC_SET_BRADY_MAX_TRACKING_RATE: 130
MDC_IDC_SET_BRADY_MODE: NORMAL
MDC_IDC_SET_BRADY_PAV_DELAY: 180
MDC_IDC_SET_BRADY_SAV_DELAY: 150
MDC_IDC_SET_LEADCHNL_RA_PACING_AMPLITUDE: 1.5
MDC_IDC_SET_LEADCHNL_RA_PACING_POLARITY: NORMAL
MDC_IDC_SET_LEADCHNL_RA_PACING_PULSEWIDTH: 0.4
MDC_IDC_SET_LEADCHNL_RA_SENSING_POLARITY: NORMAL
MDC_IDC_SET_LEADCHNL_RA_SENSING_SENSITIVITY: 0.9
MDC_IDC_SET_LEADCHNL_RV_PACING_AMPLITUDE: 2
MDC_IDC_SET_LEADCHNL_RV_PACING_POLARITY: NORMAL
MDC_IDC_SET_LEADCHNL_RV_PACING_PULSEWIDTH: 0.4
MDC_IDC_SET_LEADCHNL_RV_SENSING_POLARITY: NORMAL
MDC_IDC_SET_LEADCHNL_RV_SENSING_SENSITIVITY: 0.9
MDC_IDC_SET_ZONE_STATUS: NORMAL
MDC_IDC_SET_ZONE_STATUS: NORMAL
MDC_IDC_SET_ZONE_TYPE: NORMAL
MDC_IDC_SET_ZONE_TYPE: NORMAL
MDC_IDC_STAT_AT_BURDEN_PERCENT: 0
MDC_IDC_STAT_BRADY_RA_PERCENT_PACED: 87.49
MDC_IDC_STAT_BRADY_RV_PERCENT_PACED: 0.17

## 2025-08-04 ENCOUNTER — TELEPHONE (OUTPATIENT)
Dept: FAMILY MEDICINE CLINIC | Facility: CLINIC | Age: 85
End: 2025-08-04
Payer: MEDICARE

## 2025-08-11 ENCOUNTER — LAB (OUTPATIENT)
Dept: LAB | Facility: HOSPITAL | Age: 85
End: 2025-08-11
Payer: MEDICARE

## 2025-08-11 ENCOUNTER — OFFICE VISIT (OUTPATIENT)
Dept: FAMILY MEDICINE CLINIC | Facility: CLINIC | Age: 85
End: 2025-08-11
Payer: MEDICARE

## 2025-08-11 VITALS
HEART RATE: 71 BPM | SYSTOLIC BLOOD PRESSURE: 158 MMHG | OXYGEN SATURATION: 97 % | DIASTOLIC BLOOD PRESSURE: 96 MMHG | HEIGHT: 70 IN | BODY MASS INDEX: 28.15 KG/M2 | WEIGHT: 196.6 LBS

## 2025-08-11 DIAGNOSIS — I10 WHITE COAT SYNDROME WITH DIAGNOSIS OF HYPERTENSION: ICD-10-CM

## 2025-08-11 DIAGNOSIS — N52.9 ERECTILE DYSFUNCTION, UNSPECIFIED ERECTILE DYSFUNCTION TYPE: Primary | ICD-10-CM

## 2025-08-11 DIAGNOSIS — N52.9 ERECTILE DYSFUNCTION, UNSPECIFIED ERECTILE DYSFUNCTION TYPE: ICD-10-CM

## 2025-08-11 LAB
ANION GAP SERPL CALCULATED.3IONS-SCNC: 8.6 MMOL/L (ref 5–15)
BUN SERPL-MCNC: 19 MG/DL (ref 8–23)
BUN/CREAT SERPL: 19.4 (ref 7–25)
CALCIUM SPEC-SCNC: 10 MG/DL (ref 8.6–10.5)
CHLORIDE SERPL-SCNC: 100 MMOL/L (ref 98–107)
CO2 SERPL-SCNC: 29.4 MMOL/L (ref 22–29)
CREAT SERPL-MCNC: 0.98 MG/DL (ref 0.76–1.27)
EGFRCR SERPLBLD CKD-EPI 2021: 75.1 ML/MIN/1.73
GLUCOSE SERPL-MCNC: 96 MG/DL (ref 65–99)
POTASSIUM SERPL-SCNC: 4.7 MMOL/L (ref 3.5–5.2)
SODIUM SERPL-SCNC: 138 MMOL/L (ref 136–145)
TESTOST SERPL-MCNC: 393 NG/DL (ref 193–740)

## 2025-08-11 PROCEDURE — 36415 COLL VENOUS BLD VENIPUNCTURE: CPT

## 2025-08-11 PROCEDURE — 80048 BASIC METABOLIC PNL TOTAL CA: CPT

## 2025-08-11 PROCEDURE — 99214 OFFICE O/P EST MOD 30 MIN: CPT | Performed by: INTERNAL MEDICINE

## 2025-08-11 PROCEDURE — 84403 ASSAY OF TOTAL TESTOSTERONE: CPT

## 2025-08-11 PROCEDURE — G2211 COMPLEX E/M VISIT ADD ON: HCPCS | Performed by: INTERNAL MEDICINE

## 2025-08-20 ENCOUNTER — TELEPHONE (OUTPATIENT)
Dept: CARDIOLOGY | Facility: CLINIC | Age: 85
End: 2025-08-20
Payer: MEDICARE

## 2025-08-20 DIAGNOSIS — I49.5 SICK SINUS SYNDROME: Primary | ICD-10-CM

## 2025-08-20 LAB
MC_CV_MDC_IDC_RATE_1: 150
MC_CV_MDC_IDC_RATE_1: 162
MC_CV_MDC_IDC_THERAPIES: NORMAL
MC_CV_MDC_IDC_ZONE_ID: 2
MC_CV_MDC_IDC_ZONE_ID: 6
MDC_IDC_MSMT_BATTERY_REMAINING_LONGEVITY: 3 MO
MDC_IDC_MSMT_BATTERY_RRT_TRIGGER: 2.83
MDC_IDC_MSMT_BATTERY_STATUS: NORMAL
MDC_IDC_MSMT_BATTERY_VOLTAGE: 2.83
MDC_IDC_MSMT_LEADCHNL_RA_DTM: NORMAL
MDC_IDC_MSMT_LEADCHNL_RA_IMPEDANCE_VALUE: 437
MDC_IDC_MSMT_LEADCHNL_RA_PACING_THRESHOLD_AMPLITUDE: 0.62
MDC_IDC_MSMT_LEADCHNL_RA_PACING_THRESHOLD_POLARITY: NORMAL
MDC_IDC_MSMT_LEADCHNL_RA_PACING_THRESHOLD_PULSEWIDTH: 0.4
MDC_IDC_MSMT_LEADCHNL_RA_SENSING_INTR_AMPL: 2.38
MDC_IDC_MSMT_LEADCHNL_RV_DTM: NORMAL
MDC_IDC_MSMT_LEADCHNL_RV_IMPEDANCE_VALUE: 551
MDC_IDC_MSMT_LEADCHNL_RV_PACING_THRESHOLD_AMPLITUDE: 0.88
MDC_IDC_MSMT_LEADCHNL_RV_PACING_THRESHOLD_POLARITY: NORMAL
MDC_IDC_MSMT_LEADCHNL_RV_PACING_THRESHOLD_PULSEWIDTH: 0.4
MDC_IDC_MSMT_LEADCHNL_RV_SENSING_INTR_AMPL: 4.5
MDC_IDC_PG_IMPLANT_DTM: NORMAL
MDC_IDC_PG_MFG: NORMAL
MDC_IDC_PG_MODEL: NORMAL
MDC_IDC_PG_SERIAL: NORMAL
MDC_IDC_PG_TYPE: NORMAL
MDC_IDC_SESS_DTM: NORMAL
MDC_IDC_SESS_TYPE: NORMAL
MDC_IDC_SET_BRADY_AT_MODE_SWITCH_RATE: 162
MDC_IDC_SET_BRADY_LOWRATE: 60
MDC_IDC_SET_BRADY_MAX_SENSOR_RATE: 130
MDC_IDC_SET_BRADY_MAX_TRACKING_RATE: 130
MDC_IDC_SET_BRADY_MODE: NORMAL
MDC_IDC_SET_BRADY_PAV_DELAY: 180
MDC_IDC_SET_BRADY_SAV_DELAY: 150
MDC_IDC_SET_LEADCHNL_RA_PACING_AMPLITUDE: 1.5
MDC_IDC_SET_LEADCHNL_RA_PACING_POLARITY: NORMAL
MDC_IDC_SET_LEADCHNL_RA_PACING_PULSEWIDTH: 0.4
MDC_IDC_SET_LEADCHNL_RA_SENSING_POLARITY: NORMAL
MDC_IDC_SET_LEADCHNL_RA_SENSING_SENSITIVITY: 0.9
MDC_IDC_SET_LEADCHNL_RV_PACING_AMPLITUDE: 2
MDC_IDC_SET_LEADCHNL_RV_PACING_POLARITY: NORMAL
MDC_IDC_SET_LEADCHNL_RV_PACING_PULSEWIDTH: 0.4
MDC_IDC_SET_LEADCHNL_RV_SENSING_POLARITY: NORMAL
MDC_IDC_SET_LEADCHNL_RV_SENSING_SENSITIVITY: 0.9
MDC_IDC_SET_ZONE_STATUS: NORMAL
MDC_IDC_SET_ZONE_STATUS: NORMAL
MDC_IDC_SET_ZONE_TYPE: NORMAL
MDC_IDC_SET_ZONE_TYPE: NORMAL
MDC_IDC_STAT_AT_BURDEN_PERCENT: 0
MDC_IDC_STAT_BRADY_RA_PERCENT_PACED: 88.39
MDC_IDC_STAT_BRADY_RV_PERCENT_PACED: 0.22